# Patient Record
Sex: FEMALE | Race: WHITE | NOT HISPANIC OR LATINO | Employment: PART TIME | URBAN - METROPOLITAN AREA
[De-identification: names, ages, dates, MRNs, and addresses within clinical notes are randomized per-mention and may not be internally consistent; named-entity substitution may affect disease eponyms.]

---

## 2017-02-02 DIAGNOSIS — K21.9 GASTROESOPHAGEAL REFLUX DISEASE WITHOUT ESOPHAGITIS: ICD-10-CM

## 2017-02-02 DIAGNOSIS — E78.00 HIGH BLOOD CHOLESTEROL: Primary | ICD-10-CM

## 2017-02-06 RX ORDER — ATORVASTATIN CALCIUM 40 MG/1
40 TABLET, FILM COATED ORAL DAILY
Qty: 90 TABLET | Refills: 1 | Status: SHIPPED
Start: 2017-02-06 | End: 2017-08-10

## 2017-02-06 RX ORDER — ESOMEPRAZOLE MAGNESIUM 40 MG/1
40 CAPSULE, DELAYED RELEASE ORAL DAILY
Qty: 90 CAPSULE | Refills: 1 | Status: SHIPPED
Start: 2017-02-06 | End: 2017-08-10

## 2017-02-06 NOTE — TELEPHONE ENCOUNTER
atorvastatin (LIPITOR) 40 MG tablet  Last Written Prescription Date:  1/29/16  Last Fill Quantity: 90,   # refills: 3  Last Office Visit with G, P or [facility] prescribing provider: 8/5/16  Future Office visit:   None     esomeprazole (NEXIUM) 40 MG capsule   Last Written Prescription Date:  1/8/16  Last Fill Quantity: 90,   # refills: 3

## 2017-02-17 DIAGNOSIS — F33.1 MAJOR DEPRESSIVE DISORDER, RECURRENT EPISODE, MODERATE (H): ICD-10-CM

## 2017-02-17 DIAGNOSIS — G40.219 PARTIAL SYMPTOMATIC EPILEPSY WITH COMPLEX PARTIAL SEIZURES, INTRACTABLE, WITHOUT STATUS EPILEPTICUS (H): ICD-10-CM

## 2017-02-20 RX ORDER — SERTRALINE HYDROCHLORIDE 100 MG/1
100 TABLET, FILM COATED ORAL DAILY
Qty: 90 TABLET | Refills: 1 | Status: SHIPPED | OUTPATIENT
Start: 2017-02-20 | End: 2017-08-10

## 2017-02-20 RX ORDER — LAMOTRIGINE 25 MG/1
TABLET ORAL
Qty: 180 TABLET | Refills: 10 | OUTPATIENT
Start: 2017-02-20

## 2017-02-20 NOTE — TELEPHONE ENCOUNTER
sertraline (ZOLOFT) 100 MG      Last Written Prescription Date:  1/8/16  Last Fill Quantity: 90,   # refills: 3  Last Office Visit : 8/5/16  Future Office visit:  NONE

## 2017-02-27 ENCOUNTER — TELEPHONE (OUTPATIENT)
Dept: NEUROLOGY | Facility: CLINIC | Age: 57
End: 2017-02-27

## 2017-02-27 DIAGNOSIS — G40.309 GENERALIZED CONVULSIVE EPILEPSY (H): ICD-10-CM

## 2017-02-27 DIAGNOSIS — G40.219 PARTIAL SYMPTOMATIC EPILEPSY WITH COMPLEX PARTIAL SEIZURES, INTRACTABLE, WITHOUT STATUS EPILEPTICUS (H): ICD-10-CM

## 2017-02-27 RX ORDER — LEVETIRACETAM 500 MG/1
TABLET ORAL
Qty: 210 TABLET | Refills: 0 | Status: SHIPPED | OUTPATIENT
Start: 2017-02-27 | End: 2017-03-03

## 2017-02-27 RX ORDER — LAMOTRIGINE 25 MG/1
75 TABLET ORAL 2 TIMES DAILY
Qty: 180 TABLET | Refills: 0 | Status: SHIPPED | OUTPATIENT
Start: 2017-02-27 | End: 2017-03-03

## 2017-02-27 NOTE — TELEPHONE ENCOUNTER
----- Message from Anahi Munroe sent at 2/27/2017  8:30 AM CST -----  Regarding: Refill  Drug Name: levETIRAcetam (KEPPRA) 500 MG tablet     Dose: 500 MG    generic  SIG: Take 2.5 tabs (1250mg) by mouth twice daily                                 Days Supply Needed: Not specified    Drug Name: lamoTRIgine (LAMICTAL) 25 MG tablet   Dose: 25 MG   generic  SIG: Take 3 tablets (75 mg) by mouth 2 times daily                                 Days Supply Needed: Not specifed    Pharmacy: [facility] [address] [place] [address] [address] AT [facility] OF [address]    Date of Last Appointment: 7/26/16  Next RTC Date: 3/3/17  Has a script already been sent recently: No    Additonal Notes: Patient is completely out of her medications. Thanks.    Anahi Munroe CMA

## 2017-03-03 ENCOUNTER — OFFICE VISIT (OUTPATIENT)
Dept: NEUROLOGY | Facility: CLINIC | Age: 57
End: 2017-03-03

## 2017-03-03 VITALS
HEART RATE: 78 BPM | SYSTOLIC BLOOD PRESSURE: 128 MMHG | WEIGHT: 166 LBS | BODY MASS INDEX: 32.59 KG/M2 | DIASTOLIC BLOOD PRESSURE: 82 MMHG | HEIGHT: 60 IN

## 2017-03-03 DIAGNOSIS — G40.309 GENERALIZED CONVULSIVE EPILEPSY (H): Primary | ICD-10-CM

## 2017-03-03 DIAGNOSIS — G40.309 GENERALIZED CONVULSIVE EPILEPSY (H): ICD-10-CM

## 2017-03-03 DIAGNOSIS — G40.219 PARTIAL SYMPTOMATIC EPILEPSY WITH COMPLEX PARTIAL SEIZURES, INTRACTABLE, WITHOUT STATUS EPILEPTICUS (H): ICD-10-CM

## 2017-03-03 RX ORDER — LEVETIRACETAM 500 MG/1
TABLET ORAL
Qty: 210 TABLET | Refills: 11 | Status: SHIPPED | OUTPATIENT
Start: 2017-03-03 | End: 2018-03-22

## 2017-03-03 RX ORDER — LAMOTRIGINE 25 MG/1
75 TABLET ORAL 2 TIMES DAILY
Qty: 180 TABLET | Refills: 11 | Status: SHIPPED | OUTPATIENT
Start: 2017-03-03 | End: 2019-07-09

## 2017-03-03 NOTE — LETTER
3/3/2017       RE: Hattie Mosher  [address] [place] [address]     Dear Colleague,    Thank you for referring your patient, Hattie Mosher, to the [facility] NEUROLOGY at [facility]. Please see a copy of my visit note below.    March 3, 2017        Yg Sharp MD   Primary Care Center    [address] [place] [address] [address]   [place] [address]      RE: Hattie Mosher    MRN: [id number]    : 1960              Dear Dr. Sharp:        Mrs. Mosher is a 56 year old and she is retired.  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  She really does not notice seizures unless they occur in the daytime.  She, at night may have them and, is not aware of them.        She is being followed every 6-8 months.  She returns today and reports no overt seizures.  Her depression is well controlled.  It seems that working part-time has helped her.  Her current medication has been stable and this consists of Lamictal 75 mg twice a day and levetiracetam 1250 twice a day.  At last visit the concentrations were in  for levetiracetam and Lamictal was 4.4.  She has no ongoing concerns except she says her left side is painful, especially the left leg from the back and we reviewed.  She did have an MRI in the past and that was relatively unremarkable.      On exam today, her vitals are okay.  Her exam shows normal left-sided strength.  Reflexes are equal.  Sensory exam is normal.  Range of motion of the neck is also normal.  Sciatic stretch test is negative.      In summary, she appears to be seizure free, although is hard to tell since seizures are nocturnal.  She has some left-sided complaints suggesting sciatica on the left.  She has had before and she is reporting this is getting worse.  We will do an MRI.  We will check her concentrations today and follow once per year as per her request.        Sincerely,        Cesar Freitas MD      D: 2017  11:33   T: 2017 13:22   MT: tb      Name:     OMAR AVILA   MRN:      [id number]        Account:      [id number]   :      1960           Service Date: 2017      Document: [id number]

## 2017-03-03 NOTE — MR AVS SNAPSHOT
After Visit Summary   3/3/2017    Hattie Mosher    MRN: [id number]           Patient Information     Date Of Birth          1960        Visit Information        Provider Department      3/3/2017 8:00 AM Cesar Freitas MD [facility] Neurology        Today's Diagnoses     Generalized convulsive epilepsy (H)    -  1    Partial symptomatic epilepsy with complex partial seizures, intractable, without status epilepticus (H)           Follow-ups after your visit        Follow-up notes from your care team     Return in about 1 year (around 3/3/2018).      Who to contact     Please call your clinic at [phone] to:    Ask questions about your health    Make or cancel appointments    Discuss your medicines    Learn about your test results    Speak to your doctor   If you have compliments or concerns about an experience at your clinic, or if you wish to file a complaint, please contact [facility] Patient Relations at [phone] or email us at [email or web address]         Additional Information About Your Visit        MyChart Information     [employer] gives you secure access to your electronic health record. If you see a primary care provider, you can also send messages to your care team and make appointments. If you have questions, please call your primary care clinic.  If you do not have a primary care provider, please call [phone] and they will assist you.      Ensighten is an electronic gateway that provides easy, online access to your medical records. With Ensighten, you can request a clinic appointment, read your test results, renew a prescription or communicate with your care team.     To access your existing account, please contact your [facility] or call [phone] for assistance.        Care EveryWhere ID     This is your Care EveryWhere ID. This could be used by other organizations to access your [facility]  records  [id number]        Your Vitals Were     Pulse Height BMI (Body Mass Index)             78 1.524 m (5') 32.42 kg/m2          Blood Pressure from Last 3 Encounters:   03/03/17 128/82   08/05/16 158/78   07/26/16 (!) 166/94    Weight from Last 3 Encounters:   03/03/17 75.3 kg (166 lb)   08/05/16 75.8 kg (167 lb)   01/08/16 77.8 kg (171 lb 8 oz)                 Where to get your medicines      These medications were sent to [email or web address] - [place], [place] - [facility]  [address] [facility] [address] [address]     Phone:  [phone]     lamoTRIgine 25 MG tablet    levETIRAcetam 500 MG tablet          Primary Care Provider Office Phone # Fax #    Yg Sharp [phone]       PRIMARY CARE CENTER [address] [place] [address]        Thank you!     Thank you for choosing [facility] NEUROLOGY  for your care. Our goal is always to provide you with excellent care. Hearing back from our patients is one way we can continue to improve our services. Please take a few minutes to complete the written survey that you may receive in the mail after your visit with us. Thank you!             Your Updated Medication List - Protect others around you: Learn how to safely use, store and throw away your medicines at www.disposemymeds.org.          This list is accurate as of: 3/3/17 11:59 PM.  Always use your most recent med list.                   Brand Name Dispense Instructions for use    ALLEGRA-D 12 HOUR  MG per 12 hr tablet   Generic drug:  fexofenadine-pseudoePHEDrine      Take 1 tablet by mouth every morning.       amLODIPine 5 MG tablet    NORVASC    90 tablet    Take 1 tablet (5 mg) by mouth daily       atorvastatin 40 MG tablet    LIPITOR    90 tablet    Take 1 tablet (40 mg) by mouth daily       esomeprazole 40 MG CR capsule    nexIUM    90 capsule    Take 1 capsule (40 mg) by mouth daily       guaiFENesin-dextromethorphan 100-10 MG/5ML syrup     ROBITUSSIN DM    560 mL    Take 5 mLs by mouth every 4 hours as needed for cough       hydrocortisone 1 % cream    CORTAID     Apply topically 2 times daily as needed Reported on 3/3/2017       ibuprofen 800 MG tablet    ADVIL/MOTRIN    60 tablet    Take 1 tablet (800 mg) by mouth every 8 hours as needed for pain       lamoTRIgine 25 MG tablet    LaMICtal    180 tablet    Take 3 tablets (75 mg) by mouth 2 times daily       levETIRAcetam 500 MG tablet    KEPPRA    210 tablet    Take 2.5 tabs (1250mg) by mouth twice daily       magnesium oxide 400 MG tablet    MAG-OX    90 tablet    Take 1 tablet (400 mg) by mouth daily       NASONEX 50 MCG/ACT spray   Generic drug:  mometasone      2 sprays by Both Nostrils route daily.       sertraline 100 MG tablet    ZOLOFT    90 tablet    Take 1 tablet (100 mg) by mouth daily       THERAFLU FLU/COLD/COUGH PO      Reported on 3/3/2017       ZYRTEC 10 MG tablet   Generic drug:  cetirizine      Take 10 mg by mouth every evening.

## 2017-03-03 NOTE — PROGRESS NOTES
March 3, 2017        Yg Sharp MD   Primary Care Center    [address] [place] [address] [address]   [place] [address]      RE: Hattie Mosher    MRN: [id number]    : 1960              Dear Dr. Sharp:        Mrs. Mosher is a 56 year old and she is retired.  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  She really does not notice seizures unless they occur in the daytime.  She, at night may have them and, is not aware of them.        She is being followed every 6-8 months.  She returns today and reports no overt seizures.  Her depression is well controlled.  It seems that working part-time has helped her.  Her current medication has been stable and this consists of Lamictal 75 mg twice a day and levetiracetam 1250 twice a day.  At last visit the concentrations were in  for levetiracetam and Lamictal was 4.4.  She has no ongoing concerns except she says her left side is painful, especially the left leg from the back and we reviewed.  She did have an MRI in the past and that was relatively unremarkable.      On exam today, her vitals are okay.  Her exam shows normal left-sided strength.  Reflexes are equal.  Sensory exam is normal.  Range of motion of the neck is also normal.  Sciatic stretch test is negative.      In summary, she appears to be seizure free, although is hard to tell since seizures are nocturnal.  She has some left-sided complaints suggesting sciatica on the left.  She has had before and she is reporting this is getting worse.  We will do an MRI.  We will check her concentrations today and follow once per year as per her request.        Sincerely,        MD MIKE Rutherford MD             D: 2017 11:33   T: 2017 13:22   MT: tb      Name:     HATTIE MOSHER   MRN:      [id number]        Account:      [id number]   :      1960           Service Date: 2017      Document: [id number]

## 2017-03-03 NOTE — NURSING NOTE
Spoke with Dr Freitas via telephone. Ordered Keppra and Lamictal level per TORB.  Terell Hollingsworth RN

## 2017-03-04 LAB
LAMOTRIGINE SERPL-MCNC: 5.7 UG/ML
LEVETIRACETAM SERPL-MCNC: 57 UG/ML

## 2017-04-29 DIAGNOSIS — R51.9 HEADACHE: ICD-10-CM

## 2017-05-01 RX ORDER — IBUPROFEN 800 MG/1
800 TABLET, FILM COATED ORAL EVERY 8 HOURS PRN
Qty: 60 TABLET | Refills: 1 | Status: SHIPPED | OUTPATIENT
Start: 2017-05-01 | End: 2017-05-04

## 2017-05-02 NOTE — TELEPHONE ENCOUNTER
ibuprofen 800 MG   Last Written Prescription Date:  1/8/16  Last Fill Quantity:60   # refills: 1  Last Office Visit : 8/15/16  Future Office visit:  NONE  BP Readings from Last 3 Encounters:   03/03/17 128/82   08/05/16 158/78   07/26/16 (!) 166/94     Creatinine   Date Value Ref Range Status   08/05/2016 0.69 0.52 - 1.04 mg/dL Final

## 2017-05-04 ENCOUNTER — OFFICE VISIT (OUTPATIENT)
Dept: FAMILY MEDICINE | Facility: CLINIC | Age: 57
End: 2017-05-04

## 2017-05-04 VITALS
HEART RATE: 77 BPM | WEIGHT: 166.7 LBS | SYSTOLIC BLOOD PRESSURE: 122 MMHG | DIASTOLIC BLOOD PRESSURE: 84 MMHG | BODY MASS INDEX: 32.56 KG/M2

## 2017-05-04 DIAGNOSIS — R51.9 HEADACHE, UNSPECIFIED HEADACHE TYPE: ICD-10-CM

## 2017-05-04 DIAGNOSIS — M79.605 PAIN OF LEFT LOWER EXTREMITY: Primary | ICD-10-CM

## 2017-05-04 DIAGNOSIS — G47.33 OSA (OBSTRUCTIVE SLEEP APNEA): ICD-10-CM

## 2017-05-04 RX ORDER — IBUPROFEN 800 MG/1
800 TABLET, FILM COATED ORAL EVERY 8 HOURS PRN
Qty: 60 TABLET | Refills: 1 | Status: SHIPPED | OUTPATIENT
Start: 2017-05-04 | End: 2017-12-26

## 2017-05-04 RX ORDER — METHYLPREDNISOLONE 4 MG
TABLET, DOSE PACK ORAL
Qty: 21 TABLET | Refills: 0 | Status: SHIPPED | OUTPATIENT
Start: 2017-05-04 | End: 2017-05-22

## 2017-05-04 ASSESSMENT — PAIN SCALES - GENERAL: PAINLEVEL: MODERATE PAIN (4)

## 2017-05-04 NOTE — NURSING NOTE
Chief Complaint   Patient presents with     Pain     Pateint here for pain on left side     Refill Request     Patient here for medicaiton refills.     Bhumi Lucero LPN at 3:29 PM on 5/4/2017.

## 2017-05-04 NOTE — MR AVS SNAPSHOT
After Visit Summary   5/4/2017    Hattie Mosher    MRN: [id number]           Patient Information     Date Of Birth          1960        Visit Information        Provider Department      5/4/2017 3:05 PM Yg Sharp MD [facility]        Today's Diagnoses     Pain of left lower extremity    -  1    Headache, unspecified headache type        NOELLE (obstructive sleep apnea)           Follow-ups after your visit        Additional Services     SLEEP EVALUATION & MANAGEMENT REFERRAL - ADULT       Please be aware that coverage of these services is subject to the terms and limitations of your health insurance plan.  Call member services at your health plan with any benefit or coverage questions.      Please bring the following to your appointment:    >>   List of current medications   >>   This referral request   >>   Any documents/labs given to you for this referral    [facility]/Lab   [phone] (Age 15 and up)            SPORTS MEDICINE REFERRAL       Your provider has referred you to:  [facility]: Sports Medicine Clinic [facility] [phone]   [email or web address]    Please be aware that coverage of these services is subject to the terms and limitations of your health insurance plan.  Call member services at your health plan with any benefit or coverage questions.      Please bring the following to your appointment:    >>   Any x-rays, CTs or MRIs which have been performed.  Contact the facility where they were done to arrange for  prior to your scheduled appointment.    >>   List of current medications   >>   This referral request   >>   Any documents/labs given to you for this referral                  Follow-up notes from your care team     Return in about 3 months (around 8/4/2017).      Your next 10 appointments already scheduled     May 08, 2017  2:45 PM CDT   (Arrive by 2:30 PM)   New Patient Visit  with Erik Rojo MD   [facility] Sports Medicine ([facility])    [address]  5th Floor  [facility] [place] [address]   [phone]            Jun 19, 2017 10:00 AM CDT   New Sleep Patient with David Vargas MD   [facility] [place], Sleep Study ([facility])    [address] [place] [address]   [phone]            Aug 10, 2017  3:05 PM CDT   (Arrive by 2:50 PM)   Return Visit with Yg Sharp MD   [facility] Primary Care Clinic ([facility])    [address]  4th Floor  [place] [id number] [phone]              Future tests that were ordered for you today     Open Future Orders        Priority Expected Expires Ordered    SLEEP EVALUATION & MANAGEMENT REFERRAL - ADULT Routine  5/4/2018 5/4/2017            Who to contact     Please call your clinic at [phone] to:    Ask questions about your health    Make or cancel appointments    Discuss your medicines    Learn about your test results    Speak to your doctor   If you have compliments or concerns about an experience at your clinic, or if you wish to file a complaint, please contact [facility] Patient Relations at [phone] or email us at [email or web address]         Additional Information About Your Visit        Leiyoohart Information     SightCallt gives you secure access to your electronic health record. If you see a primary care provider, you can also send messages to your care team and make appointments. If you have questions, please call your primary care clinic.  If you do not have a primary care provider, please call [phone] and they will assist you.      SolFocus is an electronic gateway that provides easy, online access to your medical records. With [employer], you can request a clinic appointment, read your test results, renew a prescription or  communicate with your care team.     To access your existing account, please contact your [facility] Clinic or call [phone] for assistance.        Care EveryWhere ID     This is your Care EveryWhere ID. This could be used by other organizations to access your [place] medical records  [id number]        Your Vitals Were     Pulse Breastfeeding? BMI (Body Mass Index)             77 No 32.56 kg/m2          Blood Pressure from Last 3 Encounters:   05/04/17 122/84   03/03/17 128/82   08/05/16 158/78    Weight from Last 3 Encounters:   05/04/17 75.6 kg (166 lb 11.2 oz)   03/03/17 75.3 kg (166 lb)   08/05/16 75.8 kg (167 lb)              We Performed the Following     SPORTS MEDICINE REFERRAL          Today's Medication Changes          These changes are accurate as of: 5/4/17  4:10 PM.  If you have any questions, ask your nurse or doctor.               Start taking these medicines.        Dose/Directions    methylPREDNISolone 4 MG tablet   Commonly known as:  MEDROL DOSEPAK   Used for:  Pain of left lower extremity   Started by:  Yg Sharp MD        Follow package instructions   Quantity:  21 tablet   Refills:  0            Where to get your medicines      These medications were sent to [facility] [address] [address]     Phone:  [phone]     ibuprofen 800 MG tablet    methylPREDNISolone 4 MG tablet                Primary Care Provider Office Phone # Fax #    Yg Sharp [phone]       PRIMARY CARE CENTER [address] [place] [address]        Thank you!     Thank you for choosing [facility] PRIMARY CARE CLINIC  for your care. Our goal is always to provide you with excellent care. Hearing back from our patients is one way we can continue to improve our services. Please take a few minutes to complete the written survey that you may receive in the mail after your  visit with us. Thank you!             Your Updated Medication List - Protect others around you: Learn how to safely use, store and throw away your medicines at www.disposemymeds.org.          This list is accurate as of: 5/4/17  4:10 PM.  Always use your most recent med list.                   Brand Name Dispense Instructions for use    ALLEGRA-D 12 HOUR  MG per 12 hr tablet   Generic drug:  fexofenadine-pseudoePHEDrine      Take 1 tablet by mouth every morning.       amLODIPine 5 MG tablet    NORVASC    90 tablet    Take 1 tablet (5 mg) by mouth daily       atorvastatin 40 MG tablet    LIPITOR    90 tablet    Take 1 tablet (40 mg) by mouth daily       esomeprazole 40 MG CR capsule    nexIUM    90 capsule    Take 1 capsule (40 mg) by mouth daily       guaiFENesin-dextromethorphan 100-10 MG/5ML syrup    ROBITUSSIN DM    560 mL    Take 5 mLs by mouth every 4 hours as needed for cough       hydrocortisone 1 % cream    CORTAID     Apply topically 2 times daily as needed Reported on 3/3/2017       ibuprofen 800 MG tablet    ADVIL/MOTRIN    60 tablet    Take 1 tablet (800 mg) by mouth every 8 hours as needed for pain       lamoTRIgine 25 MG tablet    LaMICtal    180 tablet    Take 3 tablets (75 mg) by mouth 2 times daily       levETIRAcetam 500 MG tablet    KEPPRA    210 tablet    Take 2.5 tabs (1250mg) by mouth twice daily       magnesium oxide 400 MG tablet    MAG-OX    90 tablet    Take 1 tablet (400 mg) by mouth daily       methylPREDNISolone 4 MG tablet    MEDROL DOSEPAK    21 tablet    Follow package instructions       NASONEX 50 MCG/ACT spray   Generic drug:  mometasone      2 sprays by Both Nostrils route daily.       sertraline 100 MG tablet    ZOLOFT    90 tablet    Take 1 tablet (100 mg) by mouth daily       THERAFLU FLU/COLD/COUGH PO      Reported on 3/3/2017       ZYRTEC 10 MG tablet   Generic drug:  cetirizine      Take 10 mg by mouth every evening.

## 2017-05-04 NOTE — PROGRESS NOTES
SUBJECTIVE:    Pt is a 56 year old female with pmh of     Patient Active Problem List   Diagnosis     Generalized convulsive epilepsy (H)     NOELLE (obstructive sleep apnea)     Perforation of tympanic membrane     Epilepsy (H)     Hypertrophic and atrophic condition of skin     Hyponatremia     Essential hypertension     Low back pain     SIADH (syndrome of inappropriate ADH production): probable med-induced       who is here for evaluation of had concerns including Pain and Refill Request.    Here for a few things.  One, f/u htn, tolerates meds, good control, no cardiac sx on ROS, labs up to date on chart rvw  Two, overdue NOELLE f/u, will refer back  Three, seizures, sees neuro here, last note rvwd  Four, left leg pain, gradual onset, from hip to knee lateral, not weak/numb, no bowel/bladder control complaints. 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 No injury. Hurts to walk. No better w/ tylenol, advil. Hard to say how long its hurt.     Allergies   Allergen Reactions     Nkda [No Known Drug Allergies]      Seasonal Allergies            Current Outpatient Prescriptions   Medication Sig Dispense Refill     ibuprofen (ADVIL/MOTRIN) 800 MG tablet Take 1 tablet (800 mg) by mouth every 8 hours as needed for pain 60 tablet 1     methylPREDNISolone (MEDROL DOSEPAK) 4 MG tablet Follow package instructions 21 tablet 0     lamoTRIgine (LAMICTAL) 25 MG tablet Take 3 tablets (75 mg) by mouth 2 times daily 180 tablet 11     levETIRAcetam (KEPPRA) 500 MG tablet Take 2.5 tabs (1250mg) by mouth twice daily 210 tablet 11     sertraline (ZOLOFT) 100 MG tablet Take 1 tablet (100 mg) by mouth daily 90 tablet 1     atorvastatin (LIPITOR) 40 MG tablet Take 1 tablet (40 mg) by mouth daily 90 tablet 1     esomeprazole (NEXIUM) 40 MG CR capsule Take 1 capsule (40 mg) by mouth daily 90 capsule 1     amLODIPine (NORVASC) 5 MG tablet Take 1 tablet (5 mg) by mouth daily 90 tablet 3     magnesium oxide (MAG-OX) 400 MG tablet Take 1 tablet (400  mg) by mouth daily 90 tablet 3     guaiFENesin-dextromethorphan (ROBITUSSIN DM) 100-10 MG/5ML syrup Take 5 mLs by mouth every 4 hours as needed for cough 560 mL 0     Rucofzacp-WWA-DE-APAP (THERAFLU FLU/COLD/COUGH PO) Reported on 3/3/2017       hydrocortisone 1 % cream Apply topically 2 times daily as needed Reported on 3/3/2017       fexofenadine-pseudoephedrine (ALLEGRA-D 12 HOUR)  MG per tablet Take 1 tablet by mouth every morning.       mometasone (NASONEX) 50 MCG/ACT nasal spray 2 sprays by Both Nostrils route daily.       cetirizine (ZYRTEC) 10 MG tablet Take 10 mg by mouth every evening.         Social History   Substance Use Topics     Smoking status: Never Smoker     Smokeless tobacco: Never Used     Alcohol use 1.2 oz/week     2 Standard drinks or equivalent per week      Comment: 1-2 drinks/week           OBJECTIVE:  /84  Pulse 77  Wt 75.6 kg (166 lb 11.2 oz)  Breastfeeding? No  BMI 32.56 kg/m2  GENERAL APPEARANCE: Alert, no acute distress  RESP: lungs clear to auscultation   CV: normal rate, regular rhythm, no murmur or gallop  NEURO: Alert, oriented, speech and mentation normal  PSYCHE: mentation appears normal, affect and mood normal  Ortho: left leg normal n/v and ortho exam, mild tender lateral upper leg    ASSESSMENT/PLAN:    Leg pain: ITB vs sciatica; pred burst trial. See Sp Med  Htn; as is  Seizures: per neuro  NOELLE: overdue for f/u, refer back  She is calling her gyn for annual this summer too she states  Has summer off when school closed then busy during year, see me in August before school resumes    COMFORT ROJAS MD

## 2017-05-22 ENCOUNTER — OFFICE VISIT (OUTPATIENT)
Dept: ORTHOPEDICS | Facility: CLINIC | Age: 57
End: 2017-05-22

## 2017-05-22 DIAGNOSIS — M54.42 CHRONIC LEFT-SIDED LOW BACK PAIN WITH LEFT-SIDED SCIATICA: Primary | ICD-10-CM

## 2017-05-22 DIAGNOSIS — G89.29 CHRONIC LEFT-SIDED LOW BACK PAIN WITH LEFT-SIDED SCIATICA: Primary | ICD-10-CM

## 2017-05-22 NOTE — PROGRESS NOTES
Sports Medicine Clinic Visit    PCP: Yg Sharp JEFFERY Mosher is a 56 year old female who is seen  in consultation at the request of Dr. Sharp presenting with back pain radiating to left leg to left foot.     Injury: none      There were no vitals taken for this visit.         PMH:  Past Medical History:   Diagnosis Date     Apnea, sleep 2007    reeval 12/11 Mild NOELLE     Essential hypertension 6/7/2013     Migraine headaches      Seizure disorder, complex partial (H)      Seizures (H)        Active problem list:  Patient Active Problem List   Diagnosis     Generalized convulsive epilepsy (H)     NOELLE (obstructive sleep apnea)     Perforation of tympanic membrane     Epilepsy (H)     Hypertrophic and atrophic condition of skin     Hyponatremia     Essential hypertension     Low back pain     SIADH (syndrome of inappropriate ADH production): probable med-induced       FH:  Family History   Problem Relation Age of Onset     Neurologic Disorder Mother      MS     Cardiovascular Father      Aneurysm     Glaucoma Brother      Retinal detachment No family hx of      Macular Degeneration No family hx of        SH:  Social History     Social History     Marital status:      Spouse name: N/A     Number of children: N/A     Years of education: N/A     Occupational History      [employer]     Social History Main Topics     Smoking status: Never Smoker     Smokeless tobacco: Never Used     Alcohol use 1.2 oz/week     2 Standard drinks or equivalent per week      Comment: 1-2 drinks/week     Drug use: No     Sexual activity: Yes     Partners: Male     Other Topics Concern     Caffeine Concern Yes     Drinks several cups of coffee/day     Sleep Concern Yes     Reports history of untreated sleep apnea     Social History Narrative       MEDS:  See EMR, reviewed  ALL:  See EMR, reviewed    REVIEW OF SYSTEMS:  CONSTITUTIONAL:NEGATIVE for fever, chills, change in weight  INTEGUMENTARY/SKIN: NEGATIVE for  worrisome rashes, moles or lesions  EYES: NEGATIVE for vision changes or irritation  ENT/MOUTH: NEGATIVE for ear, mouth and throat problems  RESP:NEGATIVE for significant cough or SOB  BREAST: NEGATIVE for masses, tenderness or discharge  CV: NEGATIVE for chest pain, palpitations or peripheral edema  GI: NEGATIVE for nausea, abdominal pain, heartburn, or change in bowel habits  :NEGATIVE for frequency, dysuria, or hematuria  :NEGATIVE for frequency, dysuria, or hematuria  NEURO: NEGATIVE for weakness, dizziness or paresthesias  ENDOCRINE: NEGATIVE for temperature intolerance, skin/hair changes  HEME/ALLERGY/IMMUNE: NEGATIVE for bleeding problems  PSYCHIATRIC: NEGATIVE for changes in mood or affect        Brain MRI without and with contrast     History:  COMPLEX PARTIAL SEIZURE DISORDER  Comparison:  11/17/2006       Technique: Sagittal T1-weighted, axial diffusion, susceptibility,  FLAIR, and oblique coronal FLAIR and T2-weighted images obtained  without intravenous contrast. Following gadolinium-based intravenous  contrast administration, axial and coronal T1-weighted images were  obtained.     Findings: There are a few punctate foci of T2 and FLAIR hyperintensity  in the periventricular white matter.  There is no mass affect or midline shift or intracranial hemorrhage.  The ventricles are not enlarged out of proportion to the cerebral  sulci. The gray white matter differentiation of the cerebral  hemispheres is preserved. No definite abnormal signal is visualized  within the medial temporal lobes, and there is no definite atrophy or  volume loss in those areas. No congenital abnormality identified of  the cerebral parenchyma. Postcontrast images demonstrate no abnormal  intracranial enhancing lesions.      The major intracranial vascular flow-voids appear patent. The orbits,  the visualized portions of the paranasal sinuses, and mastoid air  cells are relatively clear.      Impression:  1. No definite temporal  lobe abnormality, mass, or congenital lesion  identified as a cause of the patient's seizures.  2. No evidence of abnormal enhancement intracranially.      I have personally reviewed the image and initial interpretation and  agree with the findings.          of the Lumbar Spine without contrast 11/22/2011     History:  SCIATICA, radiculopathy + / - LBP?- Radiates past knee,  duration .   Comparison: none     Technique: Sagittal T1-weighted, T2-weighted, STIR and  diffusion-weighted and axial T2-weighted and T2* gradient echo images  of the lumbar spine were obtained without intravenous contrast.       Findings: There are 5  lumbar-type vertebrae assumed for the purposes  of this dictation.  The tip of the conus medullaris is at  L1.  The  lumbar vertebral column appears normally aligned.  There is mild disc  height narrowing at L2-L3 and moderate disc height narrowing at L3-L4  and L4-L5. Diffuse broad-based posterior and anterior disc bulging is  evident at the same levels.  Regarding bone marrow signal intensity,  small rounded foci of T1 and T2 hyperintensity are noted at all lumbar  vertebral levels, likely indicating sites of focal fat deposition. At  L4-L5, bandlike foci of T1 and T2 hyperintensity are evident in the  opposing vertebral endplates posteriorly, consistent with Modic Type  II degenerative change. Diffusion-weighted images demonstrate no  evident areas of restricted effusion within the lower thoracic spinal  cord.     On a level by level basis:     L2-3: No spinal canal or neural foraminal narrowing.     L3-4: No spinal canal or neural foraminal narrowing.     L4-5: No spinal canal or neural foraminal narrowing.     L5-S1: No spinal canal or neural foraminal narrowing.     The visualized paraspinous tissues are unremarkable.      Impression: Mild degenerative changes at L2-L3, L3-L4 and L4-L5  without associated spinal canal or neural foraminal narrowing.            SUBJECTIVE:  This 56-year-old  "female is seen at the request of Dr. Sharp in consultation for left back and left radicular leg discomfort.  At its worst, she has discomfort she feels at the left buttock that bothers her with long periods of sitting and standing.  It can radiate from the buttock to the thigh past the knee to the shin and sometimes down to the foot.  No numbness or tingling but she will often have radiating pain.  It can make it difficult for her to position herself and to sit for long periods.  It has been a chronic problem \"for years.\"  If she looks back over the last year, she will have recurrent episodes that last for as long as a month, but they seem to happen persistently.  Then they will improve.  She had the problem in 2011 and had an MRI done of the lumbar spine that showed some mild degenerative disk changes at multiple levels but no signs of stenosis or impingement.  She did see Physical Therapy, but she admits that she does not do the exercises and did not find them particularly interesting to do.  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      OBJECTIVE:  Above ideal weight.  Straight leg raise is negative.  Lower extremity strength to flexion/extension at hip, knee, ankle including toe strength and foot evertor strength are 5/5 symmetrically bilaterally.  IVANNA test is negative bilaterally.  She is nontender over the left sacroiliac joint, mildly tender over the right.  Nontender to spring testing of the lumbar spine.  I do not palpate any abdominal masses.  Lower extremity strength is intact at the hip, knee, ankle and foot including toe strength and foot evertor strength.  Distal pulses and sensation are intact.  The overlying skin is normal.  She is appropriate in conversation and affect.      ASSESSMENT:  Low back discomfort with left radicular leg pain, recurrent for the past year.      PLAN:  A repeat MRI of the lumbar spine is pending to look for left-sided impingement.  We " talked about revisiting physical therapy or trying an epidural injection.  We will talk about further when she is seen in followup after this MRI.

## 2017-05-22 NOTE — LETTER
5/22/2017      RE: Hattie Mosher  [address] [facility] [id number]       Sports Medicine Clinic Visit    PCP: Yg Sharpjuan Mosher is a 56 year old female who is seen  in consultation at the request of Dr. Sharp presenting with back pain radiating to left leg to left foot.     Injury: none      There were no vitals taken for this visit.         PMH:  Past Medical History:   Diagnosis Date     Apnea, sleep 2007    reeval 12/11 Mild NOELLE     Essential hypertension 6/7/2013     Migraine headaches      Seizure disorder, complex partial (H)      Seizures (H)        Active problem list:  Patient Active Problem List   Diagnosis     Generalized convulsive epilepsy (H)     NOELLE (obstructive sleep apnea)     Perforation of tympanic membrane     Epilepsy (H)     Hypertrophic and atrophic condition of skin     Hyponatremia     Essential hypertension     Low back pain     SIADH (syndrome of inappropriate ADH production): probable med-induced       FH:  Family History   Problem Relation Age of Onset     Neurologic Disorder Mother      MS     Cardiovascular Father      Aneurysm     Glaucoma Brother      Retinal detachment No family hx of      Macular Degeneration No family hx of        SH:  Social History     Social History     Marital status:      Spouse name: N/A     Number of children: N/A     Years of education: N/A     Occupational History      [employer]     Social History Main Topics     Smoking status: Never Smoker     Smokeless tobacco: Never Used     Alcohol use 1.2 oz/week     2 Standard drinks or equivalent per week      Comment: 1-2 drinks/week     Drug use: No     Sexual activity: Yes     Partners: Male     Other Topics Concern     Caffeine Concern Yes     Drinks several cups of coffee/day     Sleep Concern Yes     Reports history of untreated sleep apnea     Social History Narrative       MEDS:  See EMR, reviewed  ALL:  See EMR, reviewed    REVIEW OF  SYSTEMS:  CONSTITUTIONAL:NEGATIVE for fever, chills, change in weight  INTEGUMENTARY/SKIN: NEGATIVE for worrisome rashes, moles or lesions  EYES: NEGATIVE for vision changes or irritation  ENT/MOUTH: NEGATIVE for ear, mouth and throat problems  RESP:NEGATIVE for significant cough or SOB  BREAST: NEGATIVE for masses, tenderness or discharge  CV: NEGATIVE for chest pain, palpitations or peripheral edema  GI: NEGATIVE for nausea, abdominal pain, heartburn, or change in bowel habits  :NEGATIVE for frequency, dysuria, or hematuria  :NEGATIVE for frequency, dysuria, or hematuria  NEURO: NEGATIVE for weakness, dizziness or paresthesias  ENDOCRINE: NEGATIVE for temperature intolerance, skin/hair changes  HEME/ALLERGY/IMMUNE: NEGATIVE for bleeding problems  PSYCHIATRIC: NEGATIVE for changes in mood or affect        Brain MRI without and with contrast     History:  COMPLEX PARTIAL SEIZURE DISORDER  Comparison:  11/17/2006       Technique: Sagittal T1-weighted, axial diffusion, susceptibility,  FLAIR, and oblique coronal FLAIR and T2-weighted images obtained  without intravenous contrast. Following gadolinium-based intravenous  contrast administration, axial and coronal T1-weighted images were  obtained.     Findings: There are a few punctate foci of T2 and FLAIR hyperintensity  in the periventricular white matter.  There is no mass affect or midline shift or intracranial hemorrhage.  The ventricles are not enlarged out of proportion to the cerebral  sulci. The gray white matter differentiation of the cerebral  hemispheres is preserved. No definite abnormal signal is visualized  within the medial temporal lobes, and there is no definite atrophy or  volume loss in those areas. No congenital abnormality identified of  the cerebral parenchyma. Postcontrast images demonstrate no abnormal  intracranial enhancing lesions.      The major intracranial vascular flow-voids appear patent. The orbits,  the visualized portions of the  paranasal sinuses, and mastoid air  cells are relatively clear.      Impression:  1. No definite temporal lobe abnormality, mass, or congenital lesion  identified as a cause of the patient's seizures.  2. No evidence of abnormal enhancement intracranially.      I have personally reviewed the image and initial interpretation and  agree with the findings.          of the Lumbar Spine without contrast 11/22/2011     History:  SCIATICA, radiculopathy + / - LBP?- Radiates past knee,  duration .   Comparison: none     Technique: Sagittal T1-weighted, T2-weighted, STIR and  diffusion-weighted and axial T2-weighted and T2* gradient echo images  of the lumbar spine were obtained without intravenous contrast.       Findings: There are 5  lumbar-type vertebrae assumed for the purposes  of this dictation.  The tip of the conus medullaris is at  L1.  The  lumbar vertebral column appears normally aligned.  There is mild disc  height narrowing at L2-L3 and moderate disc height narrowing at L3-L4  and L4-L5. Diffuse broad-based posterior and anterior disc bulging is  evident at the same levels.  Regarding bone marrow signal intensity,  small rounded foci of T1 and T2 hyperintensity are noted at all lumbar  vertebral levels, likely indicating sites of focal fat deposition. At  L4-L5, bandlike foci of T1 and T2 hyperintensity are evident in the  opposing vertebral endplates posteriorly, consistent with Modic Type  II degenerative change. Diffusion-weighted images demonstrate no  evident areas of restricted effusion within the lower thoracic spinal  cord.     On a level by level basis:     L2-3: No spinal canal or neural foraminal narrowing.     L3-4: No spinal canal or neural foraminal narrowing.     L4-5: No spinal canal or neural foraminal narrowing.     L5-S1: No spinal canal or neural foraminal narrowing.     The visualized paraspinous tissues are unremarkable.      Impression: Mild degenerative changes at L2-L3, L3-L4 and  "L4-L5  without associated spinal canal or neural foraminal narrowing.            SUBJECTIVE:  This 56-year-old female is seen at the request of Dr. Sharp in consultation for left back and left radicular leg discomfort.  At its worst, she has discomfort she feels at the left buttock that bothers her with long periods of sitting and standing.  It can radiate from the buttock to the thigh past the knee to the shin and sometimes down to the foot.  No numbness or tingling but she will often have radiating pain.  It can make it difficult for her to position herself and to sit for long periods.  It has been a chronic problem \"for years.\"  If she looks back over the last year, she will have recurrent episodes that last for as long as a month, but they seem to happen persistently.  Then they will improve.  She had the problem in 2011 and had an MRI done of the lumbar spine that showed some mild degenerative disk changes at multiple levels but no signs of stenosis or impingement.  She did see Physical Therapy, but she admits that she does not do the exercises and did not find them particularly interesting to do.  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      OBJECTIVE:  Above ideal weight.  Straight leg raise is negative.  Lower extremity strength to flexion/extension at hip, knee, ankle including toe strength and foot evertor strength are 5/5 symmetrically bilaterally.  IVANNA test is negative bilaterally.  She is nontender over the left sacroiliac joint, mildly tender over the right.  Nontender to spring testing of the lumbar spine.  I do not palpate any abdominal masses.  Lower extremity strength is intact at the hip, knee, ankle and foot including toe strength and foot evertor strength.  Distal pulses and sensation are intact.  The overlying skin is normal.  She is appropriate in conversation and affect.      ASSESSMENT:  Low back discomfort with left radicular leg pain, recurrent for the " past year.      PLAN:  A repeat MRI of the lumbar spine is pending to look for left-sided impingement.  We talked about revisiting physical therapy or trying an epidural injection.  We will talk about further when she is seen in followup after this MRI.           Erik Rojo MD

## 2017-05-22 NOTE — MR AVS SNAPSHOT
After Visit Summary   5/22/2017    Hattie Mosher    MRN: [id number]           Patient Information     Date Of Birth          1960        Visit Information        Provider Department      5/22/2017 3:00 PM Erik Rojo MD [facility] Sports Medicine        Today's Diagnoses     Chronic left-sided low back pain with left-sided sciatica    -  1       Follow-ups after your visit        Your next 10 appointments already scheduled     May 26, 2017  4:45 PM CDT   (Arrive by 4:30 PM)   MR LUMBAR SPINE W/O CONTRAST with [facility] ([facility])    [address] [address] [phone]           Take your medicines as usual, unless your doctor tells you not to. Bring a list of your current medicines to your exam (including vitamins, minerals and over-the-counter drugs). Also bring the results of similar scans you may have had.  Please remove any body piercings and hair extensions before you arrive.  Follow your doctor s orders. If you do not, we may have to postpone your exam.  You will not have contrast for this exam. You do not need to do anything special to prepare.  The MRI machine uses a strong magnet. Please wear clothes without metal (snaps, zippers). A sweatsuit works well, or we may give you a hospital gown.   **IMPORTANT** THE INSTRUCTIONS BELOW ARE ONLY FOR THOSE PATIENTS WHO HAVE BEEN TOLD THEY WILL RECEIVE SEDATION OR GENERAL ANESTHESIA DURING THEIR MRI PROCEDURE:  IF YOU WILL RECEIVE SEDATION (take medicine to help you relax during your exam):   You must get the medicine from your doctor before you arrive. Bring the medicine to the exam. Do not take it at home.   Arrive one hour early. Bring someone who can take you home after the test. Your medicine will make you sleepy. After the exam, you may not drive, take a bus or take a taxi by yourself.   No eating 8 hours before your exam. You may have clear  liquids up until 4 hours before your exam. (Clear liquids include water, clear tea, black coffee and fruit juice without pulp.)  IF YOU WILL RECEIVE ANESTHESIA (be asleep for your exam):   Arrive 1 1/2 hours early. Bring someone who can take you home after the test. You may not drive, take a bus or take a taxi by yourself.   No eating 8 hours before your exam. You may have clear liquids up until 4 hours before your exam. (Clear liquids include water, clear tea, black coffee and fruit juice without pulp.)   You will spend four to five hours in the recovery room.  Please call the Imaging Department at your exam site with any questions.            May 30, 2017  2:45 PM CDT   (Arrive by 2:30 PM)   Return Visit with Erik Rojo MD   [facility] Sports Medicine ([facility])    [address] [place] [address]  5th [facility] [id number] [phone]            Jun 19, 2017 10:00 AM CDT   New Sleep Patient with David Vargas MD   [facility] [place], Sleep Study ([facility])    [address] [place] [address]   [phone]            Aug 10, 2017  3:05 PM CDT   (Arrive by 2:50 PM)   Return Visit with Yg Sharp MD   [facility] ([facility])    [address] [place] [address]  4th [facility] [id number] [phone]              Future tests that were ordered for you today     Open Future Orders        Priority Expected Expires Ordered    MRI Lumbar spine w/o contrast Routine  5/22/2018 5/22/2017            Who to contact     Please call your clinic at [phone] to:    Ask questions about your health    Make or cancel appointments    Discuss your medicines    Learn about your test results    Speak to your doctor   If you have compliments or concerns about an experience at your clinic, or if you wish to file a complaint, please contact [facility]  Physicians Patient Relations at [phone] or email us at [email or web address]         Additional Information About Your Visit        [employer] Information     [employer] gives you secure access to your electronic health record. If you see a primary care provider, you can also send messages to your care team and make appointments. If you have questions, please call your primary care clinic.  If you do not have a primary care provider, please call [phone] and they will assist you.      [employer] is an electronic gateway that provides easy, online access to your medical records. With [employer], you can request a clinic appointment, read your test results, renew a prescription or communicate with your care team.     To access your existing account, please contact your [facility] or call [phone] for assistance.        Care EveryWhere ID     This is your Care EveryWhere ID. This could be used by other organizations to access your [facility] medical records  [id number]         Blood Pressure from Last 3 Encounters:   05/04/17 122/84   03/03/17 128/82   08/05/16 158/78    Weight from Last 3 Encounters:   05/04/17 166 lb 11.2 oz (75.6 kg)   03/03/17 166 lb (75.3 kg)   08/05/16 167 lb (75.8 kg)                 Today's Medication Changes          These changes are accurate as of: 5/22/17  3:24 PM.  If you have any questions, ask your nurse or doctor.               Stop taking these medicines if you haven't already. Please contact your care team if you have questions.     methylPREDNISolone 4 MG tablet   Commonly known as:  MEDROL DOSEPAK                    Primary Care Provider Office Phone # Fax #    Yg Sharp [phone]       PRIMARY CARE CENTER [address] [place] [address]        Thank you!     Thank you for choosing [facility]  for your care. Our goal is always to provide you with excellent care. Hearing back from our  patients is one way we can continue to improve our services. Please take a few minutes to complete the written survey that you may receive in the mail after your visit with us. Thank you!             Your Updated Medication List - Protect others around you: Learn how to safely use, store and throw away your medicines at www.disposemymeds.org.          This list is accurate as of: 5/22/17  3:24 PM.  Always use your most recent med list.                   Brand Name Dispense Instructions for use    ALLEGRA-D 12 HOUR  MG per 12 hr tablet   Generic drug:  fexofenadine-pseudoePHEDrine      Take 1 tablet by mouth every morning.       amLODIPine 5 MG tablet    NORVASC    90 tablet    Take 1 tablet (5 mg) by mouth daily       atorvastatin 40 MG tablet    LIPITOR    90 tablet    Take 1 tablet (40 mg) by mouth daily       esomeprazole 40 MG CR capsule    nexIUM    90 capsule    Take 1 capsule (40 mg) by mouth daily       guaiFENesin-dextromethorphan 100-10 MG/5ML syrup    ROBITUSSIN DM    560 mL    Take 5 mLs by mouth every 4 hours as needed for cough       hydrocortisone 1 % cream    CORTAID     Apply topically 2 times daily as needed Reported on 3/3/2017       ibuprofen 800 MG tablet    ADVIL/MOTRIN    60 tablet    Take 1 tablet (800 mg) by mouth every 8 hours as needed for pain       lamoTRIgine 25 MG tablet    LaMICtal    180 tablet    Take 3 tablets (75 mg) by mouth 2 times daily       levETIRAcetam 500 MG tablet    KEPPRA    210 tablet    Take 2.5 tabs (1250mg) by mouth twice daily       magnesium oxide 400 MG tablet    MAG-OX    90 tablet    Take 1 tablet (400 mg) by mouth daily       NASONEX 50 MCG/ACT spray   Generic drug:  mometasone      2 sprays by Both Nostrils route daily.       sertraline 100 MG tablet    ZOLOFT    90 tablet    Take 1 tablet (100 mg) by mouth daily       THERAFLU FLU/COLD/COUGH PO      Reported on 3/3/2017       ZYRTEC 10 MG tablet   Generic drug:  cetirizine      Take 10 mg by mouth  every evening.

## 2017-05-22 NOTE — Clinical Note
Thank you for allowing me to see your patient in Sports Medicine Clinic.  Please see the attached copy of our visit.  Sincerely,  Erik Rojo MD

## 2017-05-30 ENCOUNTER — OFFICE VISIT (OUTPATIENT)
Dept: ORTHOPEDICS | Facility: CLINIC | Age: 57
End: 2017-05-30

## 2017-05-30 VITALS — HEIGHT: 60 IN | RESPIRATION RATE: 16 BRPM | WEIGHT: 166 LBS | BODY MASS INDEX: 32.59 KG/M2

## 2017-05-30 DIAGNOSIS — M54.32 SCIATICA OF LEFT SIDE: Primary | ICD-10-CM

## 2017-05-30 NOTE — MR AVS SNAPSHOT
After Visit Summary   5/30/2017    Hattie Mosher    MRN: [id number]           Patient Information     Date Of Birth          1960        Visit Information        Provider Department      5/30/2017 2:45 PM Erik Rojo MD [facility] Sports Medicine        Today's Diagnoses     Sciatica of left side    -  1       Follow-ups after your visit        Additional Services     [facility] PT, HAND, AND CHIROPRACTIC REFERRAL       === This order will print in the [facility] Scheduling  Office ===    Physical therapy, hand therapy and chiropractic care are available through:    [facility]  [facility]    Call one easy number to schedule at any of the above locations:  [phone].    Your provider has referred you to Physical Therapy at [facility] or [facility] at [facility]    Indication/Reason for Referral: Low Back Pain with left radicular leg pain  Onset of Illness:  Recurrent since 2011  Therapy Orders:  Evaluate and Treat  Special Programs:  None  Special Request:  None    Additional Comments for the therapist or chiropractor:  Lumbar maintenance strength pgm.  Lumbar traction trial, home traction if helpful.  Pt may be interested in pilates reformer approach   8-10 visits    Please be aware that coverage of these services is subject to the terms and limitations of your health insurance plan.  Call member services at your health plan with any benefit or coverage questions.      Please bring the following to your appointment:    >>   Your personal calendar for scheduling future appointments  >>   Comfortable clothing                  Your next 10 appointments already scheduled     Jun 19, 2017 12:00 PM CDT   New Sleep Patient with Snigdhasmrithi Gilles Vargas MD   [facility] [place], Sleep Study ([facility])    [address] [place] [address]   [phone]            Aug 10, 2017  3:05 PM  CDT   (Arrive by 2:50 PM)   Return Visit with Yg Sharp MD   [facility] ([facility])    [address] [facility]  4th [facility] [address] [phone]              Who to contact     Please call your clinic at [phone] to:    Ask questions about your health    Make or cancel appointments    Discuss your medicines    Learn about your test results    Speak to your doctor   If you have compliments or concerns about an experience at your clinic, or if you wish to file a complaint, please contact [facility] Physicians Patient Relations at [phone] or email us at [email or web address]         Additional Information About Your Visit        [employer] Information     [employer] gives you secure access to your electronic health record. If you see a primary care provider, you can also send messages to your care team and make appointments. If you have questions, please call your primary care clinic.  If you do not have a primary care provider, please call [phone] and they will assist you.      [employer] is an electronic gateway that provides easy, online access to your medical records. With [employer], you can request a clinic appointment, read your test results, renew a prescription or communicate with your care team.     To access your existing account, please contact your [facility] or call [phone] for assistance.        Care EveryWhere ID     This is your Care EveryWhere ID. This could be used by other organizations to access your [facility] medical records  [id number]        Your Vitals Were     Respirations Height BMI (Body Mass Index)             16 5' (1.524 m) 32.42 kg/m2          Blood Pressure from Last 3 Encounters:   05/04/17 122/84   03/03/17 128/82   08/05/16 158/78    Weight from Last 3 Encounters:   05/30/17 166 lb (75.3 kg)   05/04/17 166 lb 11.2 oz (75.6 kg)   03/03/17 166 lb (75.3  kg)              We Performed the Following     [facility] PT, HAND, AND CHIROPRACTIC REFERRAL        Primary Care Provider Office Phone # Fax #    Yg Sharp [phone]       PRIMARY CARE CENTER [address] [place] [address]        Thank you!     Thank you for choosing [facility]  for your care. Our goal is always to provide you with excellent care. Hearing back from our patients is one way we can continue to improve our services. Please take a few minutes to complete the written survey that you may receive in the mail after your visit with us. Thank you!             Your Updated Medication List - Protect others around you: Learn how to safely use, store and throw away your medicines at www.disposemymeds.org.          This list is accurate as of: 5/30/17 11:59 PM.  Always use your most recent med list.                   Brand Name Dispense Instructions for use    ALLEGRA-D 12 HOUR  MG per 12 hr tablet   Generic drug:  fexofenadine-pseudoePHEDrine      Take 1 tablet by mouth every morning.       amLODIPine 5 MG tablet    NORVASC    90 tablet    Take 1 tablet (5 mg) by mouth daily       atorvastatin 40 MG tablet    LIPITOR    90 tablet    Take 1 tablet (40 mg) by mouth daily       esomeprazole 40 MG CR capsule    nexIUM    90 capsule    Take 1 capsule (40 mg) by mouth daily       guaiFENesin-dextromethorphan 100-10 MG/5ML syrup    ROBITUSSIN DM    560 mL    Take 5 mLs by mouth every 4 hours as needed for cough       hydrocortisone 1 % cream    CORTAID     Apply topically 2 times daily as needed Reported on 3/3/2017       ibuprofen 800 MG tablet    ADVIL/MOTRIN    60 tablet    Take 1 tablet (800 mg) by mouth every 8 hours as needed for pain       lamoTRIgine 25 MG tablet    LaMICtal    180 tablet    Take 3 tablets (75 mg) by mouth 2 times daily       levETIRAcetam 500 MG tablet    KEPPRA    210 tablet    Take 2.5 tabs (1250mg) by mouth twice daily       magnesium  oxide 400 MG tablet    MAG-OX    90 tablet    Take 1 tablet (400 mg) by mouth daily       NASONEX 50 MCG/ACT spray   Generic drug:  mometasone      2 sprays by Both Nostrils route daily.       sertraline 100 MG tablet    ZOLOFT    90 tablet    Take 1 tablet (100 mg) by mouth daily       THERAFLU FLU/COLD/COUGH PO      Reported on 3/3/2017       ZYRTEC 10 MG tablet   Generic drug:  cetirizine      Take 10 mg by mouth every evening.

## 2017-05-30 NOTE — LETTER
5/30/2017      RE: Hattie Mosher  [address] [facility] [facility] [address]       May 30, 2017: Hattie Mosher is a 56 year old female who is seen in f/u up for MRI on 5/26/17 on lumbar spine.  Low back and left radicular leg pain, recurrent since 2011    MR LUMBAR SPINE W/O CONTRAST 5/26/2017 5:18 PM     History: Lumbago with sciatica, left side, Other chronic pain.  Comparison: Lumbar spine MRI from 2011     Technique: Sagittal STIR, diffusion weighted, T1-weighted, T2-weighted  and axial T2-weighted images of the lumbar spine were obtained without  intravenous contrast.      Findings: There are 5 lumbar-type vertebrae assumed for the purposes  of this dictation.  The tip of the conus medullaris is at  L1.  The  lumbar vertebral column appears normally aligned.  There is multilevel  disc height narrowing at L3-4 L4-5 and S1 with disc desiccation. Bone  marrow signal intensity is within normal limits and no abnormal signal  is visible.      On a level by level basis:     L1-2: No significant spinal canal or foraminal narrowing.     L2-3: Mild disc bulge without any significant spinal canal or  foraminal stenosis..     L3-4: Disc bulge, small left subarticular disc protrusion which abuts  the exiting left L3 nerve root. Mild degenerative facet joint  arthropathy resulting in mild right/mild to moderate left foraminal  narrowing. Mild effacement of the left lateral recess. The spinal  canal is mildly narrowed.     L4-5: Circumferential disc bulge with superimposed bilateral  subarticular disc protrusions which extend into the neural foramen and  abut the exiting L4 nerve roots. There is mild to moderate bilateral  neural foraminal stenosis.     L5-S1: Asymmetric disc bulge eccentric to the left and accompanying  moderate degenerative facet joint arthropathy. No significant spinal  canal stenosis, however there is severe left foraminal stenosis with  likely impingement of the exiting left L5 nerve root and  mild right  foraminal narrowing.      Visualized portions of the retroperitoneal structures are  unremarkable..         Impression: Multilevel degenerative changes which are most significant  at L5-S1 as detailed above     I have personally reviewed the examination and initial interpretation  and I agree with the findings.     MELISSA ALDRICH MD        [facility]:  Past Medical History:   Diagnosis Date     Apnea, sleep 2007    reeval 12/11 Mild NOELLE     Essential hypertension 6/7/2013     Migraine headaches      Seizure disorder, complex partial (H)      Seizures (H)        Active problem list:  Patient Active Problem List   Diagnosis     Generalized convulsive epilepsy (H)     NOELLE (obstructive sleep apnea)     Perforation of tympanic membrane     Epilepsy (H)     Hypertrophic and atrophic condition of skin     Hyponatremia     Essential hypertension     Low back pain     SIADH (syndrome of inappropriate ADH production): probable med-induced       FH:  Family History   Problem Relation Age of Onset     Neurologic Disorder Mother      MS     Cardiovascular Father      Aneurysm     Glaucoma Brother      Retinal detachment No family hx of      Macular Degeneration No family hx of        SH:  Social History     Social History     Marital status:      Spouse name: N/A     Number of children: N/A     Years of education: N/A     Occupational History      [employer]     Social History Main Topics     Smoking status: Never Smoker     Smokeless tobacco: Never Used     Alcohol use 1.2 oz/week     2 Standard drinks or equivalent per week      Comment: 1-2 drinks/week     Drug use: No     Sexual activity: Yes     Partners: Male     Other Topics Concern     Caffeine Concern Yes     Drinks several cups of coffee/day     Sleep Concern Yes     Reports history of untreated sleep apnea     Social History Narrative       MEDS:  See EMR, reviewed  ALL:  See EMR, reviewed    REVIEW OF SYSTEMS:  CONSTITUTIONAL:NEGATIVE for  fever, chills, change in weight  INTEGUMENTARY/SKIN: NEGATIVE for worrisome rashes, moles or lesions  EYES: NEGATIVE for vision changes or irritation  ENT/MOUTH: NEGATIVE for ear, mouth and throat problems  RESP:NEGATIVE for significant cough or SOB  BREAST: NEGATIVE for masses, tenderness or discharge  CV: NEGATIVE for chest pain, palpitations or peripheral edema  GI: NEGATIVE for nausea, abdominal pain, heartburn, or change in bowel habits  :NEGATIVE for frequency, dysuria, or hematuria  :NEGATIVE for frequency, dysuria, or hematuria  NEURO: NEGATIVE for weakness, dizziness or paresthesias  ENDOCRINE: NEGATIVE for temperature intolerance, skin/hair changes  HEME/ALLERGY/IMMUNE: NEGATIVE for bleeding problems  PSYCHIATRIC: NEGATIVE for changes in mood or affect      OBJECTIVE:  Her exam is unchanged with low back discomfort and intermittent episodes of left-sided leg discomfort.  Strength is intact at the hip, knee, ankle and foot.  Sensation is intact as well, intermittent numbness and tingling.  Distal pulses intact.  Overlying skin is normal.  Appropriate in affect and conversation.      ASSESSMENT:  Lumbar spine degenerative disk and joint disease with left-sided sciatica complaints.      PLAN:  We went over MRI results in detail.  She knows there has been a change compared to her MRI in 2011.  Epidural injection offered, declined.  The patient would like to try physical therapy for a strengthening program and attempt at lumbar traction, even education on a Pilates reformer approach to back maintenance and she would like to see if a health club that she had access to would have a Pilates reformer machine.  If it is not helpful, she knows an epidural could be considered and also a back surgical referral.  She would like to avoid each of these for now.         Erik Rojo MD

## 2017-06-13 ENCOUNTER — THERAPY VISIT (OUTPATIENT)
Dept: PHYSICAL THERAPY | Facility: CLINIC | Age: 57
End: 2017-06-13
Payer: COMMERCIAL

## 2017-06-13 DIAGNOSIS — M54.50 LUMBAGO: Primary | ICD-10-CM

## 2017-06-13 PROCEDURE — 97110 THERAPEUTIC EXERCISES: CPT | Mod: GP | Performed by: PHYSICAL THERAPIST

## 2017-06-13 PROCEDURE — 97161 PT EVAL LOW COMPLEX 20 MIN: CPT | Mod: GP | Performed by: PHYSICAL THERAPIST

## 2017-06-13 PROCEDURE — 97530 THERAPEUTIC ACTIVITIES: CPT | Mod: GP | Performed by: PHYSICAL THERAPIST

## 2017-06-13 NOTE — MR AVS SNAPSHOT
After Visit Summary   6/13/2017    Hattie Mosher    MRN: [id number]           Patient Information     Date Of Birth          1960        Visit Information        Provider Department      6/13/2017 4:40 PM Cornelio Chan, PT [facility]        Today's Diagnoses     Lumbago    -  1       Follow-ups after your visit        Your next 10 appointments already scheduled     Rick 15, 2017 11:30 AM CDT   MELISSA Spine with Cornelio Chan, PT   [facility] ( [facility])    [address] [place] [id number] [phone]            Jun 19, 2017 12:00 PM CDT   New Sleep Patient with David Vargas MD   [facility] [place], Sleep Study ([facility])    [address] [place] [id number] [phone]            Jun 23, 2017  3:50 PM CDT   MELISSA Spine with Matt Lawrence PT   [facility] ( [facility])    [address] [place] [id number] [phone]            Aug 10, 2017  3:05 PM CDT   (Arrive by 2:50 PM)   Return Visit with Yg Sharp MD   [facility] ([facility])    [address] [address] [phone]              Who to contact     If you have questions or need follow up information about today's clinic visit or your schedule please contact [facility] directly at [phone].  Normal or non-critical lab and imaging results will be communicated to you by MyChart, letter or phone within 4 business days after the clinic has received the results. If you do not hear from us within 7 days, please contact the clinic through MyChart or phone. If you have a critical or abnormal lab result, we will notify you by phone as soon as  possible.  Submit refill requests through Handy or call your pharmacy and they will forward the refill request to us. Please allow 3 business days for your refill to be completed.          Additional Information About Your Visit        [employer] Information     Handy gives you secure access to your electronic health record. If you see a primary care provider, you can also send messages to your care team and make appointments. If you have questions, please call your primary care clinic.  If you do not have a primary care provider, please call [phone] and they will assist you.        Care EveryWhere ID     This is your Care EveryWhere ID. This could be used by other organizations to access your [place] medical records  [id number]         Blood Pressure from Last 3 Encounters:   05/04/17 122/84   03/03/17 128/82   08/05/16 158/78    Weight from Last 3 Encounters:   05/30/17 75.3 kg (166 lb)   05/04/17 75.6 kg (166 lb 11.2 oz)   03/03/17 75.3 kg (166 lb)              We Performed the Following     PT Eval, Low Complexity (65[address])     Therapeutic Activities     Therapeutic Exercises        Primary Care Provider Office Phone # Fax #    Yg Sharp [phone]       [facility] [address] [place] [id number]        Thank you!     Thank you for choosing [facility]  for your care. Our goal is always to provide you with excellent care. Hearing back from our patients is one way we can continue to improve our services. Please take a few minutes to complete the written survey that you may receive in the mail after your visit with us. Thank you!             Your Updated Medication List - Protect others around you: Learn how to safely use, store and throw away your medicines at www.disposemymeds.org.          This list is accurate as of: 6/13/17 11:59 PM.  Always use your most recent med list.                   Brand Name Dispense  Instructions for use    ALLEGRA-D 12 HOUR  MG per 12 hr tablet   Generic drug:  fexofenadine-pseudoePHEDrine      Take 1 tablet by mouth every morning.       amLODIPine 5 MG tablet    NORVASC    90 tablet    Take 1 tablet (5 mg) by mouth daily       atorvastatin 40 MG tablet    LIPITOR    90 tablet    Take 1 tablet (40 mg) by mouth daily       esomeprazole 40 MG CR capsule    nexIUM    90 capsule    Take 1 capsule (40 mg) by mouth daily       guaiFENesin-dextromethorphan 100-10 MG/5ML syrup    ROBITUSSIN DM    560 mL    Take 5 mLs by mouth every 4 hours as needed for cough       hydrocortisone 1 % cream    CORTAID     Apply topically 2 times daily as needed Reported on 3/3/2017       ibuprofen 800 MG tablet    ADVIL/MOTRIN    60 tablet    Take 1 tablet (800 mg) by mouth every 8 hours as needed for pain       lamoTRIgine 25 MG tablet    LaMICtal    180 tablet    Take 3 tablets (75 mg) by mouth 2 times daily       levETIRAcetam 500 MG tablet    KEPPRA    210 tablet    Take 2.5 tabs (1250mg) by mouth twice daily       magnesium oxide 400 MG tablet    MAG-OX    90 tablet    Take 1 tablet (400 mg) by mouth daily       NASONEX 50 MCG/ACT spray   Generic drug:  mometasone      2 sprays by Both Nostrils route daily.       sertraline 100 MG tablet    ZOLOFT    90 tablet    Take 1 tablet (100 mg) by mouth daily       THERAFLU FLU/COLD/COUGH PO      Reported on 3/3/2017       ZYRTEC 10 MG tablet   Generic drug:  cetirizine      Take 10 mg by mouth every evening.

## 2017-06-13 NOTE — PROGRESS NOTES
Subjective:    Patient is a 56 year old female presenting with rehab back hpi.   Hattie Mosher is a 56 year old female with a lumbar condition.  Condition occurred with:  Degenerative joint disease.  Condition occurred: at home.    Pt presents to PT with c/o chronic LBP and L leg discomfort since 2011.  The pain was not improving so she saw the MD on 5-22-17 and is now referred to PT for further care.  Pt would like to trail strengthening and traction prior to receiving and epidural injection.    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    Site of Pain: L SI joint pain   Radiates to: Pain all the way down into the L leg.  Pain is described as stabbing and is intermittent and reported as 3/10.  Associated symptoms:  Loss of motion/stiffness and loss of strength. Pain is the same all the time.  Exacerbated by: Walking, bending, sitting, standing. and relieved by rest and ice.      Previous treatment includes chiropractic (1x every 2 - 4 weeks cracking back and neck).  There was mild improvement following previous treatment.  General health as reported by patient is good.                                              Objective:    System         Lumbar/SI Evaluation  ROM:    AROM Lumbar:   Flexion:             75%, relieves  Ext:                    50%, ++   Side Bend:        Left:  100%    Right:  100%, +  Rotation:           Left:  100%, +++    Right:  100%, +++  Side Glide:        Left:     Right:           Strength: Decreased TrA activation          Neural Tension/Mobility:  Neural tension wnl lumbar: + L neural tension.          Functional Tests:  Core strength and proprioception lumbar: Squat anterior excursion, SLS L 2-3 seconds, R 3-5s.            SI joint/Sacrum:    + L ASLR                                            Hip Evaluation    Hip Strength:        Abduction:  Left: 4-/5     Pain:Right: 4-/5    Pain:                                      General Evaluation:                              Gait:  Gait wnl general: Mild compenstated trandelumburg B.                                         ROS    Assessment/Plan:      Patient is a 56 year old female with lumbar complaints.    Patient has the following significant findings with corresponding treatment plan.                Diagnosis 1:  LBP  Pain -  hot/cold therapy  Decreased ROM/flexibility - manual therapy and therapeutic exercise  Decreased joint mobility - manual therapy and therapeutic exercise  Decreased strength - therapeutic exercise and therapeutic activities  Impaired balance - neuro re-education and therapeutic activities  Decreased proprioception - neuro re-education and therapeutic activities  Impaired gait - gait training  Impaired muscle performance - neuro re-education  Decreased function - therapeutic activities  Impaired posture - neuro re-education    Therapy Evaluation Codes:   1) History comprised of:   Personal factors that impact the plan of care:      None.    Comorbidity factors that impact the plan of care are:      None.     Medications impacting care: None.  2) Examination of Body Systems comprised of:   Body structures and functions that impact the plan of care:      Hip and Lumbar spine.   Activity limitations that impact the plan of care are:      Lifting, Squatting/kneeling, Stairs, Standing, Walking and Working.  3) Clinical presentation characteristics are:   Stable/Uncomplicated.  4) Decision-Making    Low complexity using standardized patient assessment instrument and/or measureable assessment of functional outcome.  Cumulative Therapy Evaluation is: Low complexity.    Previous and current functional limitations:  (See Goal Flow Sheet for this information)    Short term and Long term goals: (See Goal Flow Sheet for this information)     Communication ability:  Patient appears to be able to clearly communicate and understand verbal and written communication  and follow directions correctly.  Treatment Explanation - The following has been discussed with the patient:   RX ordered/plan of care  Anticipated outcomes  Possible risks and side effects  This patient would benefit from PT intervention to resume normal activities.   Rehab potential is excellent.    Frequency:  1 X week, once daily  Duration:  for 6 weeks  Discharge Plan:  Achieve all LTG.  Independent in home treatment program.  Return to work with or without restrictions.  Return to previous functional level by discharge.  Reach maximal therapeutic benefit.    Please refer to the daily flowsheet for treatment today, total treatment time and time spent performing 1:1 timed codes.

## 2017-06-15 ENCOUNTER — THERAPY VISIT (OUTPATIENT)
Dept: PHYSICAL THERAPY | Facility: CLINIC | Age: 57
End: 2017-06-15
Payer: COMMERCIAL

## 2017-06-15 DIAGNOSIS — M54.50 LUMBAGO: ICD-10-CM

## 2017-06-15 PROCEDURE — 97530 THERAPEUTIC ACTIVITIES: CPT | Mod: GP | Performed by: PHYSICAL THERAPIST

## 2017-06-15 PROCEDURE — 97112 NEUROMUSCULAR REEDUCATION: CPT | Mod: GP | Performed by: PHYSICAL THERAPIST

## 2017-06-15 PROCEDURE — 97110 THERAPEUTIC EXERCISES: CPT | Mod: GP | Performed by: PHYSICAL THERAPIST

## 2017-06-20 ENCOUNTER — THERAPY VISIT (OUTPATIENT)
Dept: PHYSICAL THERAPY | Facility: CLINIC | Age: 57
End: 2017-06-20
Payer: COMMERCIAL

## 2017-06-20 DIAGNOSIS — M54.50 LUMBAGO: ICD-10-CM

## 2017-06-20 PROCEDURE — 97110 THERAPEUTIC EXERCISES: CPT | Mod: GP | Performed by: PHYSICAL THERAPIST

## 2017-06-20 PROCEDURE — 97530 THERAPEUTIC ACTIVITIES: CPT | Mod: GP | Performed by: PHYSICAL THERAPIST

## 2017-06-20 PROCEDURE — 97012 MECHANICAL TRACTION THERAPY: CPT | Mod: GP | Performed by: PHYSICAL THERAPIST

## 2017-06-23 ENCOUNTER — THERAPY VISIT (OUTPATIENT)
Dept: PHYSICAL THERAPY | Facility: CLINIC | Age: 57
End: 2017-06-23
Payer: COMMERCIAL

## 2017-06-23 DIAGNOSIS — G89.29 CHRONIC LEFT-SIDED LOW BACK PAIN WITH LEFT-SIDED SCIATICA: ICD-10-CM

## 2017-06-23 DIAGNOSIS — M54.42 CHRONIC LEFT-SIDED LOW BACK PAIN WITH LEFT-SIDED SCIATICA: ICD-10-CM

## 2017-06-23 PROCEDURE — 97110 THERAPEUTIC EXERCISES: CPT | Mod: GP | Performed by: PHYSICAL THERAPIST

## 2017-06-23 PROCEDURE — 97530 THERAPEUTIC ACTIVITIES: CPT | Mod: GP | Performed by: PHYSICAL THERAPIST

## 2017-06-23 PROCEDURE — 97012 MECHANICAL TRACTION THERAPY: CPT | Mod: GP | Performed by: PHYSICAL THERAPIST

## 2017-07-01 ENCOUNTER — HEALTH MAINTENANCE LETTER (OUTPATIENT)
Age: 57
End: 2017-07-01

## 2017-07-07 ENCOUNTER — THERAPY VISIT (OUTPATIENT)
Dept: PHYSICAL THERAPY | Facility: CLINIC | Age: 57
End: 2017-07-07
Payer: COMMERCIAL

## 2017-07-07 DIAGNOSIS — M54.42 CHRONIC LEFT-SIDED LOW BACK PAIN WITH LEFT-SIDED SCIATICA: ICD-10-CM

## 2017-07-07 DIAGNOSIS — G89.29 CHRONIC LEFT-SIDED LOW BACK PAIN WITH LEFT-SIDED SCIATICA: ICD-10-CM

## 2017-07-07 PROCEDURE — 97110 THERAPEUTIC EXERCISES: CPT | Mod: GP | Performed by: PHYSICAL THERAPIST

## 2017-07-07 PROCEDURE — 97530 THERAPEUTIC ACTIVITIES: CPT | Mod: GP | Performed by: PHYSICAL THERAPIST

## 2017-07-11 ENCOUNTER — THERAPY VISIT (OUTPATIENT)
Dept: PHYSICAL THERAPY | Facility: CLINIC | Age: 57
End: 2017-07-11
Payer: COMMERCIAL

## 2017-07-11 DIAGNOSIS — G89.29 CHRONIC LEFT-SIDED LOW BACK PAIN WITH LEFT-SIDED SCIATICA: ICD-10-CM

## 2017-07-11 DIAGNOSIS — M54.42 CHRONIC LEFT-SIDED LOW BACK PAIN WITH LEFT-SIDED SCIATICA: ICD-10-CM

## 2017-07-11 PROCEDURE — 97530 THERAPEUTIC ACTIVITIES: CPT | Mod: GP | Performed by: PHYSICAL THERAPIST

## 2017-07-11 PROCEDURE — 97110 THERAPEUTIC EXERCISES: CPT | Mod: GP | Performed by: PHYSICAL THERAPIST

## 2017-07-11 NOTE — MR AVS SNAPSHOT
After Visit Summary   7/11/2017    Hattie Mosher    MRN: [id number]           Patient Information     Date Of Birth          1960        Visit Information        Provider Department      7/11/2017 9:40 AM Matt Lawrence PT [facility]        Today's Diagnoses     Chronic left-sided low back pain with left-sided sciatica           Follow-ups after your visit        Your next 10 appointments already scheduled     Aug 07, 2017  8:00 AM CDT   New Sleep Patient with David Vargas MD   [facility] [place], Sleep Study ([facility])    [address] [place] [id number] [phone]            Aug 07, 2017 11:00 AM CDT   MELISSA Spine with Matt Lawrence PT   [facility] ( [facility])    [address]  [address] [address] [phone]            Aug 10, 2017  3:05 PM CDT   (Arrive by 2:50 PM)   Return Visit with Yg Sharp MD   [facility] ([facility])    [address]  4th Floor  [facility] [place] [address] [phone]              Who to contact     If you have questions or need follow up information about today's clinic visit or your schedule please contact [facility] [place] directly at [phone].  Normal or non-critical lab and imaging results will be communicated to you by MyChart, letter or phone within 4 business days after the clinic has received the results. If you do not hear from us within 7 days, please contact the clinic through MyChart or phone. If you have a critical or abnormal lab result, we will notify you by phone as soon as possible.  Submit refill requests through [employer] or call your pharmacy and they will forward the refill request to us. Please allow 3 business days for your refill to be completed.           Additional Information About Your Visit        MyChart Information     [employer] gives you secure access to your electronic health record. If you see a primary care provider, you can also send messages to your care team and make appointments. If you have questions, please call your primary care clinic.  If you do not have a primary care provider, please call [phone] and they will assist you.        Care EveryWhere ID     This is your Care EveryWhere ID. This could be used by other organizations to access your [place] medical records  [id number]         Blood Pressure from Last 3 Encounters:   05/04/17 122/84   03/03/17 128/82   08/05/16 158/78    Weight from Last 3 Encounters:   05/30/17 75.3 kg (166 lb)   05/04/17 75.6 kg (166 lb 11.2 oz)   03/03/17 75.3 kg (166 lb)              We Performed the Following     THERAPEUTIC ACTIVITIES     THERAPEUTIC EXERCISES        Primary Care Provider Office Phone # Fax #    Yg Sharp [phone]       [facility] [address] [place] [address]        Equal Access to Services     RIVERA SAHNI : Hadii shwetha ku hadasho Soganesh, waaxda luqadaha, qaybta kaalmada bud, carlota singleton. So [facility] [phone].    ATENCIÓN: Si habla español, tiene a luu disposición servicios gratPMW Technologiesos de asistencia lingüística. Jermain al [phone].    We comply with applicable federal civil rights laws and [place] laws. We do not discriminate on the basis of race, color, national origin, age, disability sex, sexual orientation or gender identity.            Thank you!     Thank you for choosing [facility] [place]  for your care. Our goal is always to provide you with excellent care. Hearing back from our patients is one way we can continue to improve our services. Please take a few minutes to complete the written survey that you may receive in the mail after your visit with us. Thank you!              Your Updated Medication List - Protect others around you: Learn how to safely use, store and throw away your medicines at www.disposemymeds.org.          This list is accurate as of: 7/11/17 10:36 AM.  Always use your most recent med list.                   Brand Name Dispense Instructions for use Diagnosis    ALLEGRA-D 12 HOUR  MG per 12 hr tablet   Generic drug:  fexofenadine-pseudoePHEDrine      Take 1 tablet by mouth every morning.        amLODIPine 5 MG tablet    NORVASC    90 tablet    Take 1 tablet (5 mg) by mouth daily    Benign essential hypertension       atorvastatin 40 MG tablet    LIPITOR    90 tablet    Take 1 tablet (40 mg) by mouth daily    High blood cholesterol       esomeprazole 40 MG CR capsule    nexIUM    90 capsule    Take 1 capsule (40 mg) by mouth daily    Gastroesophageal reflux disease without esophagitis       guaiFENesin-dextromethorphan 100-10 MG/5ML syrup    ROBITUSSIN DM    560 mL    Take 5 mLs by mouth every 4 hours as needed for cough    Cough       hydrocortisone 1 % cream    CORTAID     Apply topically 2 times daily as needed Reported on 3/3/2017        ibuprofen 800 MG tablet    ADVIL/MOTRIN    60 tablet    Take 1 tablet (800 mg) by mouth every 8 hours as needed for pain    Headache, unspecified headache type       lamoTRIgine 25 MG tablet    LaMICtal    180 tablet    Take 3 tablets (75 mg) by mouth 2 times daily    Partial symptomatic epilepsy with complex partial seizures, intractable, without status epilepticus (H), Generalized convulsive epilepsy (H)       levETIRAcetam 500 MG tablet    KEPPRA    210 tablet    Take 2.5 tabs (1250mg) by mouth twice daily    Generalized convulsive epilepsy (H), Partial symptomatic epilepsy with complex partial seizures, intractable, without status epilepticus (H)       magnesium oxide 400 MG tablet    MAG-OX    90 tablet    Take 1 tablet (400 mg) by mouth daily    Hypomagnesemia       NASONEX 50 MCG/ACT spray   Generic drug:   mometasone      2 sprays by Both Nostrils route daily.        sertraline 100 MG tablet    ZOLOFT    90 tablet    Take 1 tablet (100 mg) by mouth daily    Major depressive disorder, recurrent episode, moderate (H)       THERAFLU FLU/COLD/COUGH PO      Reported on 3/3/2017        ZYRTEC 10 MG tablet   Generic drug:  cetirizine      Take 10 mg by mouth every evening.

## 2017-07-11 NOTE — PROGRESS NOTES
PROGRESS REPORT    Hattie has been in therapy from June 13, 2017 to Jul 11, 2017 for treatment of LBP.    Therapist Impression:  Hattie returns to therapy after trialing repeated extensions with maybe some improvement.  It is really difficult to establish a directional preference as it seems some of her pain may be from stenosis in which she benefits from flexion and she may be presenting with some disc type pain in that case she would benefit from extension.  At this point, she will perform both for home and we are introducing nerve flossing as well as she does present with a positive SLR.  She will self manage while she is on vacation for 3 weeks and reassess he progress.  If she is still having issues, she will follow up with Dr. Rojo for further evaluation.    Subjective:  Subjective: Maybe slightly better but tough to say.    Objective:  Objective: Repeated extension in prone improved pain; tightness continues to be present.  Pain with spring testing L5-S1, negative sacral thrust.     Repeated flexion in supine improved tightness in leg.  Positive SLR test at 40 degrees on L.  Pain with L SLR on R; pain eliminated after repeated flexion    ASSESSMENT/PLAN  Updated problem list and treatment plan: The encounter diagnosis was Chronic left-sided low back pain with left-sided sciatica. Pain - HEP  Decreased ROM/flexibility - HEP  Decreased function - HEP  Decreased strength - HEP  Impaired muscle performance - HEP  STG/LTGs have been met or progress has been made towards goals:  None  Assessment of Progress: The patient's condition has potential to improve.  Self Management Plans:  Patient has been instructed in a home treatment program.  Patient  has been instructed in self management of symptoms.  I have re-evaluated this patient and find that the nature, scope, duration and intensity of the therapy is appropriate for the medical condition of the patient.  Hattie continues to require the following intervention to  meet STG and LTG's:  PT    Recommendations:  This patient would benefit from continued therapy.     Frequency:  1 X week, once daily  Duration:  for 4 weeks.  Refer to MD sooner if not progress is being made.              Please refer to the daily flowsheet for treatment today, total treatment time and time spent performing 1:1 timed codes.

## 2017-08-07 ENCOUNTER — OFFICE VISIT (OUTPATIENT)
Dept: SLEEP MEDICINE | Facility: CLINIC | Age: 57
End: 2017-08-07
Attending: FAMILY MEDICINE
Payer: COMMERCIAL

## 2017-08-07 VITALS
RESPIRATION RATE: 18 BRPM | HEART RATE: 80 BPM | HEIGHT: 60 IN | BODY MASS INDEX: 34.36 KG/M2 | OXYGEN SATURATION: 94 % | WEIGHT: 175 LBS

## 2017-08-07 DIAGNOSIS — G47.33 OSA (OBSTRUCTIVE SLEEP APNEA): ICD-10-CM

## 2017-08-07 DIAGNOSIS — R53.82 CHRONIC FATIGUE: ICD-10-CM

## 2017-08-07 DIAGNOSIS — R53.82 CHRONIC FATIGUE: Primary | ICD-10-CM

## 2017-08-07 LAB — TSH SERPL DL<=0.005 MIU/L-ACNC: 1.83 MU/L (ref 0.4–4)

## 2017-08-07 PROCEDURE — 84443 ASSAY THYROID STIM HORMONE: CPT | Performed by: INTERNAL MEDICINE

## 2017-08-07 PROCEDURE — 36415 COLL VENOUS BLD VENIPUNCTURE: CPT | Performed by: INTERNAL MEDICINE

## 2017-08-07 PROCEDURE — 99211 OFF/OP EST MAY X REQ PHY/QHP: CPT | Mod: 25

## 2017-08-07 NOTE — MR AVS SNAPSHOT
After Visit Summary   8/7/2017    Hattie Mosher    MRN: [id number]           Patient Information     Date Of Birth          1960        Visit Information        Provider Department      8/7/2017 8:00 AM David Vargas MD [facility], [place], Sleep Study        Today's Diagnoses     Chronic fatigue    -  1    NOELLE (obstructive sleep apnea)          Care Instructions      Your BMI is Body mass index is 34.18 kg/(m^2).  Weight management is a personal decision.  If you are interested in exploring weight loss strategies, the following discussion covers the approaches that may be successful. Body mass index (BMI) is one way to tell whether you are at a healthy weight, overweight, or obese. It measures your weight in relation to your height.  A BMI of 18.5 to 24.9 is in the healthy range. A person with a BMI of 25 to 29.9 is considered overweight, and someone with a BMI of 30 or greater is considered obese. More than two-thirds of American adults are considered overweight or obese.  Being overweight or obese increases the risk for further weight gain. Excess weight may lead to heart disease and diabetes.  Creating and following plans for healthy eating and physical activity may help you improve your health.  Weight control is part of healthy lifestyle and includes exercise, emotional health, and healthy eating habits. Careful eating habits lifelong are the mainstay of weight control. Though there are significant health benefits from weight loss, long-term weight loss with diet alone may be very difficult to achieve- studies show long-term success with dietary management in less than 10% of people. Attaining a healthy weight may be especially difficult to achieve in those with severe obesity. In some cases, medications, devices and surgical management might be considered.  What can you do?  If you are overweight or obese and are interested in methods for weight loss, you should  discuss this with your provider.     Consider reducing daily calorie intake by 500 calories.     Keep a food journal.     Avoiding skipping meals, consider cutting portions instead.    Diet combined with exercise helps maintain muscle while optimizing fat loss. Strength training is particularly important for building and maintaining muscle mass. Exercise helps reduce stress, increase energy, and improves fitness. Increasing exercise without diet control, however, may not burn enough calories to loose weight.       Start walking three days a week 10-20 minutes at a time    Work towards walking thirty minutes five days a week     Eventually, increase the speed of your walking for 1-2 minutes at time    In addition, we recommend that you review healthy lifestyles and methods for weight loss available through the National Institutes of Health patient information sites:  http://win.niddk.nih.gov/publications/index.htm    And look into health and wellness programs that may be available through your health insurance provider, employer, local community center, or [employer]ust[employer] club.    Weight management plan: Patient was referred to their PCP to discuss a diet and exercise plan.    Please do not drive or operating heavy machinery ,  if drowsy or sleepy.          Follow-ups after your visit        Follow-up notes from your care team     Return in about 2 weeks (around 8/21/2017).      Your next 10 appointments already scheduled     Aug 10, 2017  3:05 PM CDT   (Arrive by 2:50 PM)   Return Visit with Yg Sharp MD   [facility] ([facility])    [address] [address] [phone]            Aug 17, 2017 11:00 AM CDT   [facility] with [facility] ( [facility])    [facility]  [address]  [facility] [address] [phone]              Future tests that were ordered for  you today     Open Future Orders        Priority Expected Expires Ordered    TSH Routine  10/6/2017 8/7/2017    Comprehensive Sleep Study Routine  2/3/2018 8/7/2017            Who to contact     If you have questions or need follow up information about today's clinic visit or your schedule please [facility] directly at [phone].  Normal or non-critical lab and imaging results will be communicated to you by MyChart, letter or phone within 4 business days after the clinic has received the results. If you do not hear from us within 7 days, please contact the clinic through [employer] or phone. If you have a critical or abnormal lab result, we will notify you by phone as soon as possible.  Submit refill requests through [employer] or call your pharmacy and they will forward the refill request to us. Please allow 3 business days for your refill to be completed.          Additional Information About Your Visit        [employer] Information     [employer] gives you secure access to your electronic health record. If you see a primary care provider, you can also send messages to your care team and make appointments. If you have questions, please call your primary care clinic.  If you do not have a primary care provider, please call [phone] and they will assist you.        Care EveryWhere ID     This is your Care EveryWhere ID. This could be used by other organizations to access your [facility] medical records  [id number]        Your Vitals Were     Pulse Respirations Height Pulse Oximetry BMI (Body Mass Index)       80 18 1.524 m (5') 94% 34.18 kg/m2        Blood Pressure from Last 3 Encounters:   05/04/17 122/84   03/03/17 128/82   08/05/16 158/78    Weight from Last 3 Encounters:   08/07/17 79.4 kg (175 lb)   05/30/17 75.3 kg (166 lb)   05/04/17 75.6 kg (166 lb 11.2 oz)              We Performed the Following     SLEEP EVALUATION & MANAGEMENT REFERRAL - ADULT        Primary Care Provider Office Phone  # Fax #    Yg Sharp [phone]       [facility] [address] [place] [id number]        Equal Access to Services     RIVERA SAHNI : Hadii aad ku hadbhavnaamy Hilaryganesh, waaxda luqadaha, qaybta kaalmada adeegsheldonda, carlota frausto teoeileen lanewilner singleton. So [facility] [phone].    ATENCIÓN: Si habla español, tiene a luu disposición servicios gratuitos de asistencia lingüística. Llame al [phone].    We comply with applicable federal civil rights laws and [place] laws. We do not discriminate on the basis of race, color, national origin, age, disability sex, sexual orientation or gender identity.            Thank you!     Thank you for choosing [facility] [place]EP [facility]  for your care. Our goal is always to provide you with excellent care. Hearing back from our patients is one way we can continue to improve our services. Please take a few minutes to complete the written survey that you may receive in the mail after your visit with us. Thank you!             Your Updated Medication List - Protect others around you: Learn how to safely use, store and throw away your medicines at www.disposemymeds.org.          This list is accurate as of: 8/7/17  9:22 AM.  Always use your most recent med list.                   Brand Name Dispense Instructions for use Diagnosis    ALLEGRA-D 12 HOUR  MG per 12 hr tablet   Generic drug:  fexofenadine-pseudoePHEDrine      Take 1 tablet by mouth every morning.        amLODIPine 5 MG tablet    NORVASC    90 tablet    Take 1 tablet (5 mg) by mouth daily    Benign essential hypertension       atorvastatin 40 MG tablet    LIPITOR    90 tablet    Take 1 tablet (40 mg) by mouth daily    High blood cholesterol       esomeprazole 40 MG CR capsule    nexIUM    90 capsule    Take 1 capsule (40 mg) by mouth daily    Gastroesophageal reflux disease without esophagitis       ibuprofen 800 MG tablet    ADVIL/MOTRIN    60 tablet    Take 1 tablet (800  mg) by mouth every 8 hours as needed for pain    Headache, unspecified headache type       lamoTRIgine 25 MG tablet    LaMICtal    180 tablet    Take 3 tablets (75 mg) by mouth 2 times daily    Partial symptomatic epilepsy with complex partial seizures, intractable, without status epilepticus (H), Generalized convulsive epilepsy (H)       levETIRAcetam 500 MG tablet    KEPPRA    210 tablet    Take 2.5 tabs (1250mg) by mouth twice daily    Generalized convulsive epilepsy (H), Partial symptomatic epilepsy with complex partial seizures, intractable, without status epilepticus (H)       magnesium oxide 400 MG tablet    MAG-OX    90 tablet    Take 1 tablet (400 mg) by mouth daily    Hypomagnesemia       NASONEX 50 MCG/ACT spray   Generic drug:  mometasone      2 sprays by Both Nostrils route daily.        sertraline 100 MG tablet    ZOLOFT    90 tablet    Take 1 tablet (100 mg) by mouth daily    Major depressive disorder, recurrent episode, moderate (H)       ZYRTEC 10 MG tablet   Generic drug:  cetirizine      Take 10 mg by mouth every evening.

## 2017-08-07 NOTE — PATIENT INSTRUCTIONS

## 2017-08-07 NOTE — PROGRESS NOTES
"Sleep Consultation Note:    Date on this visit: 8/7/2017    Hattie Mosher is sent by Dr. Yg Sharp for a sleep consultation regarding follow-up for mild NOELLE.    Primary Physician: Yg Sharp     CC:  \"my PCP thought I should have a follow-up since it has been a while.\"    Hattie Mosher 56 year old female with PMH seizure disorder, depression, HTN, and mild NOELLE who complains persistent snoring and gasping/choking despite use of dental appliance. Patient initially diagnosed with NOELLE in 2006 by [place] [facility]. Patient was last seen in [facility] sleep clinic in 2011. At which time, she had a repeat sleep study completed on 12/8/11 showing mild NOELLE showing AHI of 6.5. She had a CPAP machine, but was not tolerating the mask. It did not fit well and her skin broke out in rashes. It was recommended that she should be fitted for a dental appliance. The patient has not been seen in a sleep clinic since that recommendation was made.     Today, the patient reports having a dental appliance and using it every night. She tolerates it well. She notes that she has not used it during her naps. She continues to report snoring (although not loud enough to bother her bed partner), gasping/choking, occasional morning headaches, and dry mouth. She also notes some daytime fatigue, ESS 12. Of note, patient has formally documented evidence of seizures while sleeping. She reports that she is unsure how many of her symptoms are due to her seizures vs. NOELLE. She notes that she has been retired for 3-4 years now, and believes this has improved both her sleep quality and seizure frequency. Lastly, patient states that her daughter has noticed that the patient's breathing occasionally stops or develops a strange rhythm while she is awake. She denies any witnessed sleep apneic events.       Sleep Disordered Breathing  Hattie does complain of snoring, choking/gasping for air, dry mouth, morning headaches 3x/week, non-refreshing " sleep 3-4x/week, and daytime fatigue.  Hattie does not complain of snort arousals.   Hattei's weight has not changed since her last sleep study on 12/8/11.    Sleep Schedule/Sleep Complaints  She does not complain of difficulty with falling asleep. During weekdays, Hattie goes to bed at 10:30 PM, and it usually takes 30 minutes to fall asleep. She wakes up at 7-8AM.  On weekends, She falls asleep at 11PM and gets up 8AM.  She would naturally to go to sleep at 10-10:30PM and wake up at 7AM.    Hattie does not complain of restlessness/crawling/antsy feelings in the legs.    She does complain of spontaneous leg movements/jerks when transitioning from wakefullness to sleep. She does not complain of spontaneous leg movements/jerks in the middle of the night.    Patient does not use electronics or work in bed. She does read in bed.   Patient does have a regular bed partner.  Patient sleeps on her side.  Patient does not have any pets in the bedroom at night during sleep.  She does not use a sleep aid.    She does not complain of difficulty with staying asleep. However, she wakes up 2-3 times throughout the night. She feels that she is awake 30-60 minutes during the entire night.   Night time awakenings occur due to need to use the bathroom, but other times she is unsure of the the cause.    Patient does not complain of chronic pain, depression, which affects the maintenance of sleep. Patient does complain of occasional ruminating thoughts, stress/anxiety after particularly stressful day, which affects the maintenance of sleep. This occurs less often now that she has been retired for 3-4 years.  After awakening, She is able to fall back asleep after 10 minutes.    Sleep Behaviors  She denies any cataplexy, sleep paralysis, sleep hallucinations.    She denies any night time behaviors - sleep walking, sleep talking, sleep eating.    She does not complain acting out dreams.    Daytime Functioning  Hattie lays down every day -  during this time she sometimes will nap. She does not use her dental device during naps. She naps for 30-60 minutes and sometimes feels refreshed.     She denies drowsiness while driving.    Patient's [place] Sleepiness score 12/24 consistent with daytime sleepiness.      Social History  Hattie is retired and previously worked in the [employer]. Currently works part time in a school cafeteria. She does not do shift work currently or in the past.  She does drink caffeine: 2 cups of coffee daily, 1 can of pop daily, and occasionally has tea. She does not drink energy drinks. Last caffeine intake is within 6 hours of bed time.  She drinks alcohol socially on the weekends.  Does not use alcohol as a sleep aid.  Patient is a never smoker.  Patient does not use drugs.  She lives  at home. No pets.     Allergies:    Allergies   Allergen Reactions     Nkda [No Known Drug Allergies]      Seasonal Allergies        Medications:    Current Outpatient Prescriptions   Medication Sig Dispense Refill     ibuprofen (ADVIL/MOTRIN) 800 MG tablet Take 1 tablet (800 mg) by mouth every 8 hours as needed for pain 60 tablet 1     lamoTRIgine (LAMICTAL) 25 MG tablet Take 3 tablets (75 mg) by mouth 2 times daily 180 tablet 11     levETIRAcetam (KEPPRA) 500 MG tablet Take 2.5 tabs (1250mg) by mouth twice daily 210 tablet 11     sertraline (ZOLOFT) 100 MG tablet Take 1 tablet (100 mg) by mouth daily 90 tablet 1     atorvastatin (LIPITOR) 40 MG tablet Take 1 tablet (40 mg) by mouth daily 90 tablet 1     esomeprazole (NEXIUM) 40 MG CR capsule Take 1 capsule (40 mg) by mouth daily 90 capsule 1     amLODIPine (NORVASC) 5 MG tablet Take 1 tablet (5 mg) by mouth daily 90 tablet 3     magnesium oxide (MAG-OX) 400 MG tablet Take 1 tablet (400 mg) by mouth daily 90 tablet 3     fexofenadine-pseudoephedrine (ALLEGRA-D 12 HOUR)  MG per tablet Take 1 tablet by mouth every morning.       mometasone (NASONEX) 50 MCG/ACT nasal spray 2  sprays by Both Nostrils route daily.       cetirizine (ZYRTEC) 10 MG tablet Take 10 mg by mouth every evening.         Problem List:  Patient Active Problem List    Diagnosis Date Noted     Lumbago 2017     Priority: Medium     SIADH (syndrome of inappropriate ADH production): probable med-induced 2013     Priority: Medium     Hyponatremia 2013     Priority: Medium     Essential hypertension 2013     Priority: Medium     Low back pain 2013     Priority: Medium     Diagnosis updated by automated process. Provider to review and confirm.       Perforation of tympanic membrane 2012     Priority: Medium     Problem list name updated by automated process. Provider to review       Epilepsy (H) 2012     Priority: Medium     Problem list name updated by automated process. Provider to review       Hypertrophic and atrophic condition of skin 2012     Priority: Medium     Problem list name updated by automated process. Provider to review       NOELLE (obstructive sleep apnea) 2011     Priority: Medium     Generalized convulsive epilepsy (H) 2011     Priority: Medium     Problem list name updated by automated process. Provider to review          Past Medical/Surgical History:  Past Medical History:   Diagnosis Date     Apnea, sleep 2007    reeval  Mild NOELLE     Essential hypertension 2013     Migraine headaches      Seizure disorder, complex partial (H)      Seizures (H)      Past Surgical History:   Procedure Laterality Date      SECTION      3x     COLONOSCOPY  2011    Procedure:COMBINED COLONOSCOPY, REMOVE TUMOR/POLYP/LESION BY SNARE; Surgeon:PAULA BEAN; Location: GI     ENDOSCOPIC RELEASE CARPAL TUNNEL  2013    Procedure: ENDOSCOPIC RELEASE CARPAL TUNNEL;  Left Endoscopic Carpal Tunnel Release;  Surgeon: Kate Najera MD;  Location:  OR     ENDOSCOPIC RELEASE CARPAL TUNNEL  2013    Procedure: ENDOSCOPIC  RELEASE CARPAL TUNNEL;  Right Endoscopic Carpal Tunnel Release  ;  Surgeon: Kate Najera MD;  Location: [facility]     HYSTERECTOMY      Ovaries intact.  No cancer.        Social History:  Social History     Social History     Marital status:      Spouse name: N/A     Number of children: N/A     Years of education: N/A     Occupational History      [employer]     Social History Main Topics     Smoking status: Never Smoker     Smokeless tobacco: Never Used     Alcohol use 1.2 oz/week     2 Standard drinks or equivalent per week      Comment: 1-2 drinks/week     Drug use: No     Sexual activity: Yes     Partners: Male     Other Topics Concern     Caffeine Concern Yes     Drinks several cups of coffee/day     Sleep Concern Yes     Reports history of untreated sleep apnea     Social History Narrative       Family History:  Family History   Problem Relation Age of Onset     Neurologic Disorder Mother      MS     Cardiovascular Father      Aneurysm     Glaucoma Brother      Retinal detachment No family hx of      Macular Degeneration No family hx of        Review of Systems:  A complete review of systems reviewed by me is negative with the exeption of what has been mentioned in the history of present illness.  CONSTITUTIONAL: NEGATIVE for weight gain/loss, fever, chills, sweats or night sweats, drug allergies.  EYES: NEGATIVE for blind spots, double vision. POSITIVE for intermittent problems with focusing/fuzzy vision - being seen for this.  ENT: NEGATIVE for ear pain, sinus pain, post-nasal drip, runny nose, bloody nose. POSITIVE for sore throat  CARDIAC: NEGATIVE for fast heartbeats or fluttering in chest, chest pain or pressure, breathlessness when lying flat, swollen legs or swollen feet.  NEUROLOGIC: NEGATIVE weakness or numbness in the arms or legs. POSITIVE for tension headaches, sinus headaches, migraines  DERMATOLOGIC: NEGATIVE for rashes, new moles or change in mole(s)  PULMONARY:  NEGATIVE SOB at rest, SOB with activity, coughing up blood, wheezing or whistling when breathing.  POSITIVE for chronic dry and productive cough  GASTROINTESTINAL: NEGATIVE for nausea or vomitting, loose or watery stools, fat or grease in stools, constipation, abdominal pain, bowel movements black in color or blood noted.  GENITOURINARY: NEGATIVE for pain during urination, blood in urine. POSITIVE for urinary frequency.   MUSCULOSKELETAL: NEGATIVE for muscle pain, bone or joint pain, swollen joints.  ENDOCRINE: NEGATIVE for increased thirst or urination, diabetes.  LYMPHATIC: NEGATIVE for swollen lymph nodes, lumps or bumps in the breasts or nipple discharge.  PSYCHE: POSITIVE for depression, anxiety    Physical Examination:  Vitals: Pulse 80  Resp 18  Ht 1.524 m (5')  Wt 79.4 kg (175 lb)  SpO2 94%  BMI 34.18 kg/m2  BMI= Body mass index is 34.18 kg/(m^2).      [place] Total Score 8/7/2017   Total score - Coolidge 12       General: No apparent distress, appropriately groomed  Head: Normocephalic, atraumatic  Eyes: no icterus or conjunctival injection  Nose: Right nare patent, left nare congested. No exudate. Mild turbinate erythema and congestion. Slight left septal deviation  Mouth: op pink and moist  Orophraynx:    Mallampati Class: II.   Tonsillar Stage: 1  hidden by pillars.  Cardiac: RRR no m/r/g appreciated  Chest: Symmetric air movement, lungs clear to auscultation bilaterally  Skin: Warm, dry, intact  Psych: Mood pleasant, affect congruent  Neuro: A&Ox3, no focal neuro deficits, moving extremities equally    Impression/Plan:  #Obstructive sleep apnea, mild: Patient has known diagnosis of mild NOELLE with AHI 6.5. Last sleep study completed on 12/8/11. Patient has since been using a dental appliance with continued symptoms including snoring, gasping/choking, and daytime fatigue. Patient's clinical course is complicated by her seizure disorder (see below).   - Recommend in-lab sleep study to evaluate the  "effectiveness of dental appliance. Will obtain baseline during the  first hour, after that the study will be done with  dental appliance.  Patient has agreed to this plan.   - Patient is a life long non-smoker and has been encouraged to continue to not smoke.    #Seizure Disorder: Patient has documented seizures while sleeping. It is unclear, if patient is currently having seizures that are disrupting her sleep. Night time seizures and her seizure medication may be contributing to daytime sleepiness/fatigue. The sleep study will include seizure montage , though it is likely we may not capture epileptogenic activity during single night sleep study.  - Continue to follow-up with neurology.      Patient was strongly advised to avoid driving, operating any heavy machinery or other hazardous situations while drowsy or sleepy.  Patient was counseled on the importance of driving while alert, to pull over if drowsy, or nap before getting into the vehicle if sleepy.      She will follow up at sleep clinic in  approximately one to two weeks after her sleep study has been competed to review the results and discuss plan of care.    CC: Yg Sharp      Scribed by Margarita Gooden  Medical Student,MS4    Scribe Disclosure:   Margarita DUPREE, MS4, am serving as a scribe; to document services personally performed by David Vargas-based on data collection and the provider's statements to me.     Provider Disclosure:  LEIA,David Vargas, agree with above History, Review of Systems, Physical exam and Plan.  I have reviewed the content of the documentation and have edited it as needed. I have personally performed the services documented here and the documentation accurately represents those services and the decisions I have made.        \"I spent a total of  60 minutes face to face with Hattie Mosher during today's office visit. Over 50% of this time was spent counseling the patient and  " "coordinating care regarding sleep apnea and seizures .\"       David Vargas MD   of Medicine,  Division of Pulmonary/Sleep Medicine  [facility].              "

## 2017-08-07 NOTE — NURSING NOTE
Chief Complaint   Patient presents with     Consult     Follow up cpap       Initial Pulse 80  Resp 18  Ht 1.524 m (5')  Wt 79.4 kg (175 lb)  SpO2 94%  BMI 34.18 kg/m2 Estimated body mass index is 34.18 kg/(m^2) as calculated from the following:    Height as of this encounter: 1.524 m (5').    Weight as of this encounter: 79.4 kg (175 lb).  Medication Reconciliation: complete     TOYIN Hercules

## 2017-08-10 ENCOUNTER — OFFICE VISIT (OUTPATIENT)
Dept: FAMILY MEDICINE | Facility: CLINIC | Age: 57
End: 2017-08-10

## 2017-08-10 VITALS
RESPIRATION RATE: 16 BRPM | SYSTOLIC BLOOD PRESSURE: 130 MMHG | HEART RATE: 89 BPM | DIASTOLIC BLOOD PRESSURE: 85 MMHG | WEIGHT: 173 LBS | BODY MASS INDEX: 33.79 KG/M2

## 2017-08-10 DIAGNOSIS — E78.00 HIGH BLOOD CHOLESTEROL: ICD-10-CM

## 2017-08-10 DIAGNOSIS — K21.9 GASTROESOPHAGEAL REFLUX DISEASE WITHOUT ESOPHAGITIS: ICD-10-CM

## 2017-08-10 DIAGNOSIS — I10 BENIGN ESSENTIAL HYPERTENSION: ICD-10-CM

## 2017-08-10 DIAGNOSIS — Z12.31 VISIT FOR SCREENING MAMMOGRAM: Primary | ICD-10-CM

## 2017-08-10 DIAGNOSIS — F33.1 MAJOR DEPRESSIVE DISORDER, RECURRENT EPISODE, MODERATE (H): ICD-10-CM

## 2017-08-10 LAB
ALBUMIN SERPL-MCNC: 3.7 G/DL (ref 3.4–5)
ALP SERPL-CCNC: 91 U/L (ref 40–150)
ALT SERPL W P-5'-P-CCNC: 39 U/L (ref 0–50)
ANION GAP SERPL CALCULATED.3IONS-SCNC: 8 MMOL/L (ref 3–14)
AST SERPL W P-5'-P-CCNC: 20 U/L (ref 0–45)
BILIRUB SERPL-MCNC: 0.2 MG/DL (ref 0.2–1.3)
BUN SERPL-MCNC: 13 MG/DL (ref 7–30)
CALCIUM SERPL-MCNC: 9.2 MG/DL (ref 8.5–10.1)
CHLORIDE SERPL-SCNC: 106 MMOL/L (ref 94–109)
CHOLEST SERPL-MCNC: 210 MG/DL
CO2 SERPL-SCNC: 27 MMOL/L (ref 20–32)
CREAT SERPL-MCNC: 0.72 MG/DL (ref 0.52–1.04)
GFR SERPL CREATININE-BSD FRML MDRD: 83 ML/MIN/1.7M2
GLUCOSE SERPL-MCNC: 124 MG/DL (ref 70–99)
HDLC SERPL-MCNC: 69 MG/DL
LDLC SERPL CALC-MCNC: 113 MG/DL
NONHDLC SERPL-MCNC: 142 MG/DL
POTASSIUM SERPL-SCNC: 3.8 MMOL/L (ref 3.4–5.3)
PROT SERPL-MCNC: 7.6 G/DL (ref 6.8–8.8)
SODIUM SERPL-SCNC: 141 MMOL/L (ref 133–144)
TRIGL SERPL-MCNC: 143 MG/DL

## 2017-08-10 RX ORDER — ATORVASTATIN CALCIUM 40 MG/1
TABLET, FILM COATED ORAL
Qty: 90 TABLET | Refills: 0 | Status: CANCELLED | OUTPATIENT
Start: 2017-08-10

## 2017-08-10 RX ORDER — ESOMEPRAZOLE MAGNESIUM 40 MG/1
40 CAPSULE, DELAYED RELEASE ORAL DAILY
Qty: 90 CAPSULE | Refills: 3 | Status: SHIPPED | OUTPATIENT
Start: 2017-08-10 | End: 2018-03-21

## 2017-08-10 RX ORDER — ATORVASTATIN CALCIUM 40 MG/1
40 TABLET, FILM COATED ORAL DAILY
Qty: 90 TABLET | Refills: 3 | Status: SHIPPED | OUTPATIENT
Start: 2017-08-10 | End: 2018-09-24

## 2017-08-10 RX ORDER — SERTRALINE HYDROCHLORIDE 100 MG/1
100 TABLET, FILM COATED ORAL DAILY
Qty: 90 TABLET | Refills: 3 | Status: SHIPPED | OUTPATIENT
Start: 2017-08-10 | End: 2018-09-10

## 2017-08-10 RX ORDER — ESOMEPRAZOLE MAGNESIUM 40 MG/1
CAPSULE, DELAYED RELEASE ORAL
Qty: 90 CAPSULE | Refills: 0 | Status: CANCELLED | OUTPATIENT
Start: 2017-08-10

## 2017-08-10 RX ORDER — AMLODIPINE BESYLATE 5 MG/1
5 TABLET ORAL DAILY
Qty: 90 TABLET | Refills: 3 | Status: SHIPPED | OUTPATIENT
Start: 2017-08-10 | End: 2018-09-24

## 2017-08-10 ASSESSMENT — PAIN SCALES - GENERAL: PAINLEVEL: MODERATE PAIN (5)

## 2017-08-10 NOTE — MR AVS SNAPSHOT
After Visit Summary   8/10/2017    Hattie Mosher    MRN: [id number]           Patient Information     Date Of Birth          1960        Visit Information        Provider Department      8/10/2017 3:05 PM Yg Sharp MD [facility]        Today's Diagnoses     Visit for screening mammogram    -  1    Major depressive disorder, recurrent episode, moderate (H)        High blood cholesterol        Gastroesophageal reflux disease without esophagitis        Benign essential hypertension          Care Instructions    Primary Care Center Medication Refill Request Information:  * Please contact your pharmacy regarding ANY request for medication refills.  ** [facility] Prescription Fax = [phone]  * Please allow 3 business days for routine medication refills.  * Please allow 5 business days for controlled substance medication refills.     Primary Care Center Test Result notification information:  *You will be notified with in 7-10 days of your appointment day regarding the results of your test.  If you are on MyChart you will be notified as soon as the provider has reviewed the results and signed off on them.    Primary Care Center [phone]             Follow-ups after your visit        Follow-up notes from your care team     Return in about 1 year (around 8/10/2018).      Your next 10 appointments already scheduled     Aug 10, 2017  4:15 PM CDT   LAB with  LAB    Health Lab ([facility])    [address] [place] [id number] [phone]           Patient must bring picture ID. Patient should be prepared to give a urine specimen  Please do not eat 10-12 hours before your appointment if you are coming in fasting for labs on lipids, cholesterol, or glucose (sugar). Pregnant women should follow their Care Team instructions. Water with medications is okay. Do not drink coffee or other fluids. If you have concerns about taking   your medications, please ask at office or if scheduling via [facility], send a message by clicking on Secure Messaging, Message Your Care Team.            Aug 17, 2017 11:00 AM CDT   MELISSA Spine with Matt Lawrence PT   [facility] ( [facility])    [address] [place] [id number] [phone]              Future tests that were ordered for you today     Open Future Orders        Priority Expected Expires Ordered    Lipid panel reflex to direct LDL Routine 8/10/2017 8/24/2017 8/10/2017    Comprehensive metabolic panel Routine 8/10/2017 8/24/2017 8/10/2017            Who to contact     Please call your clinic at [phone] to:    Ask questions about your health    Make or cancel appointments    Discuss your medicines    Learn about your test results    Speak to your doctor   If you have compliments or concerns about an experience at your clinic, or if you wish to file a complaint, please contact [facility] Patient Relations at [phone] or email us at [email or web address]         Additional Information About Your Visit        [employer] Information     [facility] gives you secure access to your electronic health record. If you see a primary care provider, you can also send messages to your care team and make appointments. If you have questions, please call your primary care clinic.  If you do not have a primary care provider, please call [phone] and they will assist you.      [facility] is an electronic gateway that provides easy, online access to your medical records. With [facility], you can request a clinic appointment, read your test results, renew a prescription or communicate with your care team.     To access your existing account, please contact your [facility] or call [phone] for assistance.        Care EveryWhere ID     This is your Care EveryWhere ID. This could be  used by other organizations to access your [place] medical records  [id number]        Your Vitals Were     Pulse Respirations Breastfeeding? BMI (Body Mass Index)          89 16 No 33.79 kg/m2         Blood Pressure from Last 3 Encounters:   08/10/17 130/85   05/04/17 122/84   03/03/17 128/82    Weight from Last 3 Encounters:   08/10/17 78.5 kg (173 lb)   08/07/17 79.4 kg (175 lb)   05/30/17 75.3 kg (166 lb)                 Where to get your medicines      These medications were sent to [facility] - [place], [place] - [facility]  [address] [facility] [address]     Phone:  [phone]     amLODIPine 5 MG tablet    atorvastatin 40 MG tablet    esomeprazole 40 MG CR capsule    sertraline 100 MG tablet          Primary Care Provider Office Phone # Fax #    Yg Sharp [phone]       [address] [address]        Equal Access to Services     IGNACIO SAHNI : Hadii aad ku hadasho Soomaali, waaxda luqadaha, qaybta kaalmada adeegyada, waxay idiin haygurdeep samano . So [facility] [phone].    ATENCIÓN: Si habla español, tiene a luu disposición servicios gratuitos de asistencia lingüística. [facility] [phone].    We comply with applicable federal civil rights laws and [place] laws. We do not discriminate on the basis of race, color, national origin, age, disability sex, sexual orientation or gender identity.            Thank you!     Thank you for choosing [facility]  for your care. Our goal is always to provide you with excellent care. Hearing back from our patients is one way we can continue to improve our services. Please take a few minutes to complete the written survey that you may receive in the mail after your visit with us. Thank you!             Your Updated Medication List - Protect others around you: Learn how to safely use, store and throw away your medicines at www.disposemymeds.org.           This list is accurate as of: 8/10/17  4:11 PM.  Always use your most recent med list.                   Brand Name Dispense Instructions for use Diagnosis    ALLEGRA-D 12 HOUR  MG per 12 hr tablet   Generic drug:  fexofenadine-pseudoePHEDrine      Take 1 tablet by mouth every morning.        amLODIPine 5 MG tablet    NORVASC    90 tablet    Take 1 tablet (5 mg) by mouth daily    Benign essential hypertension       atorvastatin 40 MG tablet    LIPITOR    90 tablet    Take 1 tablet (40 mg) by mouth daily    High blood cholesterol       esomeprazole 40 MG CR capsule    nexIUM    90 capsule    Take 1 capsule (40 mg) by mouth daily    Gastroesophageal reflux disease without esophagitis       ibuprofen 800 MG tablet    ADVIL/MOTRIN    60 tablet    Take 1 tablet (800 mg) by mouth every 8 hours as needed for pain    Headache, unspecified headache type       lamoTRIgine 25 MG tablet    LaMICtal    180 tablet    Take 3 tablets (75 mg) by mouth 2 times daily    Partial symptomatic epilepsy with complex partial seizures, intractable, without status epilepticus (H), Generalized convulsive epilepsy (H)       levETIRAcetam 500 MG tablet    KEPPRA    210 tablet    Take 2.5 tabs (1250mg) by mouth twice daily    Generalized convulsive epilepsy (H), Partial symptomatic epilepsy with complex partial seizures, intractable, without status epilepticus (H)       magnesium oxide 400 MG tablet    MAG-OX    90 tablet    Take 1 tablet (400 mg) by mouth daily    Hypomagnesemia       NASONEX 50 MCG/ACT spray   Generic drug:  mometasone      2 sprays by Both Nostrils route daily.        sertraline 100 MG tablet    ZOLOFT    90 tablet    Take 1 tablet (100 mg) by mouth daily    Major depressive disorder, recurrent episode, moderate (H)       ZYRTEC 10 MG tablet   Generic drug:  cetirizine      Take 10 mg by mouth every evening.

## 2017-08-10 NOTE — PATIENT INSTRUCTIONS
[facility] Medication Refill Request Information:  * Please contact your pharmacy regarding ANY request for medication refills.  ** [facility] Prescription Fax = [phone]  * Please allow 3 business days for routine medication refills.  * Please allow 5 business days for controlled substance medication refills.     [facility] Test Result notification information:  *You will be notified with in 7-10 days of your appointment day regarding the results of your test.  If you are on MyChart you will be notified as soon as the provider has reviewed the results and signed off on them.    [facility] [phone]

## 2017-08-10 NOTE — NURSING NOTE
Chief Complaint   Patient presents with     Recheck Medication     Patient is here to discuss medication     Sandy Rebolledo CMA 3:05 PM on 8/10/2017.

## 2017-08-17 ENCOUNTER — THERAPY VISIT (OUTPATIENT)
Dept: PHYSICAL THERAPY | Facility: CLINIC | Age: 57
End: 2017-08-17
Payer: COMMERCIAL

## 2017-08-17 ENCOUNTER — TELEPHONE (OUTPATIENT)
Dept: INTERNAL MEDICINE | Facility: CLINIC | Age: 57
End: 2017-08-17

## 2017-08-17 DIAGNOSIS — R30.0 DYSURIA: ICD-10-CM

## 2017-08-17 DIAGNOSIS — G89.29 CHRONIC LEFT-SIDED LOW BACK PAIN WITH LEFT-SIDED SCIATICA: ICD-10-CM

## 2017-08-17 DIAGNOSIS — M54.42 CHRONIC LEFT-SIDED LOW BACK PAIN WITH LEFT-SIDED SCIATICA: ICD-10-CM

## 2017-08-17 DIAGNOSIS — R73.09 HIGH GLUCOSE: Primary | ICD-10-CM

## 2017-08-17 PROCEDURE — 97530 THERAPEUTIC ACTIVITIES: CPT | Mod: GP | Performed by: PHYSICAL THERAPIST

## 2017-08-17 PROCEDURE — 97110 THERAPEUTIC EXERCISES: CPT | Mod: GP | Performed by: PHYSICAL THERAPIST

## 2017-08-17 NOTE — MR AVS SNAPSHOT
After Visit Summary   8/17/2017    Hattie Mosher    MRN: [id number]           Patient Information     Date Of Birth          1960        Visit Information        Provider Department      8/17/2017 11:00 AM Matt Lawrence PT [facility]        Today's Diagnoses     Chronic left-sided low back pain with left-sided sciatica           Follow-ups after your visit        Who to contact     If you have questions or need follow up information about today's clinic visit or your schedule please contact [facility] directly at [phone].  Normal or non-critical lab and imaging results will be communicated to you by [employer], letter or phone within 4 business days after the clinic has received the results. If you do not hear from us within 7 days, please contact the clinic through [employer] or phone. If you have a critical or abnormal lab result, we will notify you by phone as soon as possible.  Submit refill requests through [employer] or call your pharmacy and they will forward the refill request to us. Please allow 3 business days for your refill to be completed.          Additional Information About Your Visit        MyChart Information     [employer] gives you secure access to your electronic health record. If you see a primary care provider, you can also send messages to your care team and make appointments. If you have questions, please call your primary care clinic.  If you do not have a primary care provider, please call [phone] and they will assist you.        Care EveryWhere ID     This is your Care EveryWhere ID. This could be used by other organizations to access your [place] medical records  [id number]         Blood Pressure from Last 3 Encounters:   08/10/17 130/85   05/04/17 122/84   03/03/17 128/82    Weight from Last 3 Encounters:   08/10/17 78.5 kg (173 lb)   08/07/17 79.4 kg (175 lb)   05/30/17 75.3 kg (166 lb)               We Performed the Following     THERAPEUTIC ACTIVITIES     THERAPEUTIC EXERCISES        Primary Care Provider Office Phone # Fax #    Yg Sharp [phone]       [address] [place] [address]        Equal Access to Services     RIVERA SAHNI : Jacky shwetha singh khaio Soomaali, waaxda luqadaha, qaybta kaalmada adeegyada, carlota geenain hayaaarnie bruceeileen butler jazmyne singleton. So [facility] [phone].    ATENCIÓN: Si habla español, tiene a luu disposición servicios gratuitos de asistencia lingüística. Llame al [phone].    We comply with applicable federal civil rights laws and [place] laws. We do not discriminate on the basis of race, color, national origin, age, disability sex, sexual orientation or gender identity.            Thank you!     [facility] [place]  for your care. Our goal is always to provide you with excellent care. Hearing back from our patients is one way we can continue to improve our services. Please take a few minutes to complete the written survey that you may receive in the mail after your visit with us. Thank you!             Your Updated Medication List - Protect others around you: Learn how to safely use, store and throw away your medicines at www.disposemymeds.org.          This list is accurate as of: 8/17/17  1:01 PM.  Always use your most recent med list.                   Brand Name Dispense Instructions for use Diagnosis    ALLEGRA-D 12 HOUR  MG per 12 hr tablet   Generic drug:  fexofenadine-pseudoePHEDrine      Take 1 tablet by mouth every morning.        amLODIPine 5 MG tablet    NORVASC    90 tablet    Take 1 tablet (5 mg) by mouth daily    Benign essential hypertension       atorvastatin 40 MG tablet    LIPITOR    90 tablet    Take 1 tablet (40 mg) by mouth daily    High blood cholesterol       esomeprazole 40 MG CR capsule    nexIUM    90 capsule    Take 1 capsule (40 mg) by mouth daily     Gastroesophageal reflux disease without esophagitis       ibuprofen 800 MG tablet    ADVIL/MOTRIN    60 tablet    Take 1 tablet (800 mg) by mouth every 8 hours as needed for pain    Headache, unspecified headache type       lamoTRIgine 25 MG tablet    LaMICtal    180 tablet    Take 3 tablets (75 mg) by mouth 2 times daily    Partial symptomatic epilepsy with complex partial seizures, intractable, without status epilepticus (H), Generalized convulsive epilepsy (H)       levETIRAcetam 500 MG tablet    KEPPRA    210 tablet    Take 2.5 tabs (1250mg) by mouth twice daily    Generalized convulsive epilepsy (H), Partial symptomatic epilepsy with complex partial seizures, intractable, without status epilepticus (H)       magnesium oxide 400 MG tablet    MAG-OX    90 tablet    Take 1 tablet (400 mg) by mouth daily    Hypomagnesemia       NASONEX 50 MCG/ACT spray   Generic drug:  mometasone      2 sprays by Both Nostrils route daily.        sertraline 100 MG tablet    ZOLOFT    90 tablet    Take 1 tablet (100 mg) by mouth daily    Major depressive disorder, recurrent episode, moderate (H)       ZYRTEC 10 MG tablet   Generic drug:  cetirizine      Take 10 mg by mouth every evening.

## 2017-08-17 NOTE — TELEPHONE ENCOUNTER
----- Message from Yg Sharp MD sent at 8/14/2017  3:22 PM CDT -----  Can you give her a call; sugar sneaking up to borderline too high--I'd like her to do hgba1c near future lab only re: prediabetes

## 2017-08-17 NOTE — PROGRESS NOTES
Therapist Impression:   Hattie returns to therapy with worsening Oswestry and Med Start outcomes forms.  She reports her symptoms are worse especially since she has been dong a lot of sitting.  At this point, due to lack of progress, we outlined a plan below for Hattie.    1. 2nd PT opinion with Jorge A Dueñas, RAMIRO Vazquez or Mazin Acevedo, PT in [facility]  [address]  [place] [address]  Clinic phone: [phone]    2. Call Dr. Rojo's office to determine follow up with him or with another specialist (injection or back specialist)    3. Continue strengthening routine (monitor symptoms)    4. Determine insurance plan for October    Objective:  Repeated flexion in standing no change  Repeated extension in standing improved symptom  Burning in leg after today's session.

## 2017-08-23 DIAGNOSIS — R73.09 HIGH GLUCOSE: ICD-10-CM

## 2017-08-23 DIAGNOSIS — R30.0 DYSURIA: ICD-10-CM

## 2017-08-23 LAB — HBA1C MFR BLD: 6 % (ref 4.3–6)

## 2017-08-28 NOTE — PROGRESS NOTES
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    Here with a f/u.  Due for labs, high chol, on statin    BP good control on well tolerated med as listed    NOELLE: August 7 note rvwd    Sciatica still present despite good attempt PT, she plans f/u Sp Med    No new c/o    Neg cardiopulm ROS    Allergies   Allergen Reactions     Nkda [No Known Drug Allergies]      Seasonal Allergies                  Current Outpatient Prescriptions   Medication Sig Dispense Refill     sertraline (ZOLOFT) 100 MG tablet Take 1 tablet (100 mg) by mouth daily 90 tablet 3     atorvastatin (LIPITOR) 40 MG tablet Take 1 tablet (40 mg) by mouth daily 90 tablet 3     esomeprazole (NEXIUM) 40 MG CR capsule Take 1 capsule (40 mg) by mouth daily 90 capsule 3     amLODIPine (NORVASC) 5 MG tablet Take 1 tablet (5 mg) by mouth daily 90 tablet 3     ibuprofen (ADVIL/MOTRIN) 800 MG tablet Take 1 tablet (800 mg) by mouth every 8 hours as needed for pain 60 tablet 1     lamoTRIgine (LAMICTAL) 25 MG tablet Take 3 tablets (75 mg) by mouth 2 times daily 180 tablet 11     levETIRAcetam (KEPPRA) 500 MG tablet Take 2.5 tabs (1250mg) by mouth twice daily 210 tablet 11     magnesium oxide (MAG-OX) 400 MG tablet Take 1 tablet (400 mg) by mouth daily 90 tablet 3     fexofenadine-pseudoephedrine (ALLEGRA-D 12 HOUR)  MG per tablet Take 1 tablet by mouth every morning.       mometasone (NASONEX) 50 MCG/ACT nasal spray 2 sprays by Both Nostrils route daily.       cetirizine (ZYRTEC) 10 MG tablet Take 10 mg by mouth  every evening.         Social History   Substance Use Topics     Smoking status: Never Smoker     Smokeless tobacco: Never Used     Alcohol use 1.2 oz/week     2 Standard drinks or equivalent per week      Comment: 1-2 drinks/week         OBJECTIVE:  /85 (BP Location: Left arm, Patient Position: Sitting, Cuff Size: Adult Large)  Pulse 89  Resp 16  Wt 78.5 kg (173 lb)  Breastfeeding? No  BMI 33.79 kg/m2  GENERAL APPEARANCE: Alert, no acute distress  RESP: lungs clear to auscultation   CV: normal rate, regular rhythm, no murmur or gallop  MS: extremities normal, no peripheral edema  NEURO: Alert, oriented, speech and mentation normal  PSYCHE: mentation appears normal, affect and mood normal    ASSESSMENT/PLAN:    Htn: as is  High chol: labs  NOELLE: per sleep clinic plan  Cont w/ neuro/seizures, well controlled  Discussed diet/exer/wt loss importance      COMFORT ROJAS MD

## 2017-10-11 DIAGNOSIS — G40.309 GENERALIZED CONVULSIVE EPILEPSY (H): ICD-10-CM

## 2017-10-11 DIAGNOSIS — G40.219 PARTIAL SYMPTOMATIC EPILEPSY WITH COMPLEX PARTIAL SEIZURES, INTRACTABLE, WITHOUT STATUS EPILEPTICUS (H): ICD-10-CM

## 2017-10-12 RX ORDER — LAMOTRIGINE 25 MG/1
TABLET ORAL
Qty: 180 TABLET | Refills: 11 | Status: SHIPPED | OUTPATIENT
Start: 2017-10-12 | End: 2018-04-20

## 2017-12-16 DIAGNOSIS — G40.309 GENERALIZED CONVULSIVE EPILEPSY (H): ICD-10-CM

## 2017-12-16 DIAGNOSIS — G40.219 PARTIAL SYMPTOMATIC EPILEPSY WITH COMPLEX PARTIAL SEIZURES, INTRACTABLE, WITHOUT STATUS EPILEPTICUS (H): ICD-10-CM

## 2017-12-26 DIAGNOSIS — R51.9 HEADACHE, UNSPECIFIED HEADACHE TYPE: ICD-10-CM

## 2017-12-26 RX ORDER — IBUPROFEN 800 MG/1
800 TABLET, FILM COATED ORAL EVERY 8 HOURS PRN
Qty: 60 TABLET | Refills: 1 | Status: SHIPPED | OUTPATIENT
Start: 2017-12-26 | End: 2018-11-01

## 2017-12-26 RX ORDER — LEVETIRACETAM 500 MG/1
TABLET ORAL
Qty: 210 TABLET | Refills: 0 | Status: SHIPPED | OUTPATIENT
Start: 2017-12-26 | End: 2018-04-20

## 2018-02-27 ENCOUNTER — TELEPHONE (OUTPATIENT)
Dept: INTERNAL MEDICINE | Facility: CLINIC | Age: 58
End: 2018-02-27

## 2018-02-27 NOTE — TELEPHONE ENCOUNTER
Prior Authorization Retail Medication Request  Medication/Dose: esomeprazole (NEXIUM) 40 MG CR   Diagnosis and ICD code:   New/Renewal/Insurance Change PA:   Previously Tried and Failed Therapies:     Insurance ID (if provided):   Insurance Phone (if provided):     Any additional info from fax request:     If you received a fax notification from an outside Pharmacy:  Pharmacy Name:  Pharmacy #:  Pharmacy Fax:

## 2018-03-01 NOTE — TELEPHONE ENCOUNTER
Prior Authorization Not Needed per Insurance    Medication: esomeprazole (NEXIUM) 40 MG CR -PA NOT NEEDED  Insurance Company: [employer] - Phone [phone] Fax [phone]  Expected CoPay:      Pharmacy Filling the Rx: [facility] - [place], [place] - [address] [place] [address]  Pharmacy Notified: No  Patient Notified: No    This is a non-formulary medication for the patient's plan, they are covered at a higher copay level and are not allowed tier exceptions.

## 2018-03-01 NOTE — TELEPHONE ENCOUNTER
Central Prior Authorization Team   Phone: [phone]      PA Initiation    Medication: esomeprazole (NEXIUM) 40 MG CR -Initiated  Insurance Company: [employer] - Phone [phone] Fax [phone]  Pharmacy Filling the Rx: "[facility] - [place], [place] - [address] [place] [address]  Filling Pharmacy Phone: [phone]  Filling Pharmacy Fax:    Start Date: 3/1/2018

## 2018-03-20 ENCOUNTER — TELEPHONE (OUTPATIENT)
Dept: INTERNAL MEDICINE | Facility: CLINIC | Age: 58
End: 2018-03-20

## 2018-03-20 DIAGNOSIS — K21.9 GASTROESOPHAGEAL REFLUX DISEASE WITHOUT ESOPHAGITIS: ICD-10-CM

## 2018-03-20 NOTE — TELEPHONE ENCOUNTER
Pt is taking nexium 40 mg QD.  Pt's insurance covers nexium, but copay is still high ($70). Pt is wondering if she could have alternative.    Soon[facility]Mi

## 2018-03-21 NOTE — TELEPHONE ENCOUNTER
Yes suggest Zantac 150 mg po bid, ok 60 tab/six refill, if doing ok on it then at next visit could discuss doing three mo at a time, or if even if ever possible to wean down or off of these sorts of meds.

## 2018-03-22 ENCOUNTER — TELEPHONE (OUTPATIENT)
Dept: NEUROLOGY | Facility: CLINIC | Age: 58
End: 2018-03-22

## 2018-03-22 DIAGNOSIS — G40.309 GENERALIZED CONVULSIVE EPILEPSY (H): ICD-10-CM

## 2018-03-22 DIAGNOSIS — G40.219 PARTIAL SYMPTOMATIC EPILEPSY WITH COMPLEX PARTIAL SEIZURES, INTRACTABLE, WITHOUT STATUS EPILEPTICUS (H): ICD-10-CM

## 2018-03-22 RX ORDER — LEVETIRACETAM 500 MG/1
TABLET ORAL
Qty: 465 TABLET | Refills: 1 | Status: SHIPPED | OUTPATIENT
Start: 2018-03-22 | End: 2018-04-20

## 2018-03-22 NOTE — TELEPHONE ENCOUNTER
Nurse received refill request for:  mg  Last re-ordered: 12/26/17  Last refill: 12/14/17  Last appointment: 3/3/17  Next appointment: Unscheduled  From last clinic notes:  We will check her concentrations today and follow once per year as per her request.    Pharmacy:  [facility] - Radha Wilkes

## 2018-03-23 RX ORDER — LEVETIRACETAM 500 MG/1
TABLET ORAL
Qty: 35 TABLET | Refills: 0 | Status: SHIPPED | OUTPATIENT
Start: 2018-03-23 | End: 2018-09-27

## 2018-03-23 NOTE — TELEPHONE ENCOUNTER
Nurse received In-Basket message as follows:  From: Nora Meadows      Sent: 3/23/2018   8:37 AM        To: [facility]ult[facility]   Subject: Dr. Freitas Seizure pt  calling asking f*     Pt  Julius calling asking if there is any way the pt could get a weeks worth of Rx. levETIRAcetam (KEPPRA) 500 MG tablet sent to their [facility] [address] [address] AT [facility] [place] [address] [place] [address]. Per pt  Julius the mail order pharmacy wont get the medication to the pt until middle of next week. Pt is symptomatic today and is in need of the medication now for she has 0 left. FYI pt  Julius was very apologetic and stated that it was their fault that the medication was not ordered on time but is asking for a one time allowance for the pt to get the medication today.     Per Julius the pt is feeling very dizzy and cloudy, took today off work and wanted Dr. Freitas to be aware.     Please contact Julius about medication status at [phone]   Order sent for Keppra 500 mg x 35 tab  Nurse returned call to  with this information.

## 2018-04-20 ENCOUNTER — APPOINTMENT (OUTPATIENT)
Dept: LAB | Facility: CLINIC | Age: 58
End: 2018-04-20
Payer: COMMERCIAL

## 2018-04-20 ENCOUNTER — OFFICE VISIT (OUTPATIENT)
Dept: NEUROLOGY | Facility: CLINIC | Age: 58
End: 2018-04-20
Payer: COMMERCIAL

## 2018-04-20 VITALS
HEART RATE: 86 BPM | SYSTOLIC BLOOD PRESSURE: 131 MMHG | BODY MASS INDEX: 33.69 KG/M2 | WEIGHT: 171.6 LBS | DIASTOLIC BLOOD PRESSURE: 75 MMHG | HEIGHT: 60 IN

## 2018-04-20 DIAGNOSIS — G40.219 PARTIAL SYMPTOMATIC EPILEPSY WITH COMPLEX PARTIAL SEIZURES, INTRACTABLE, WITHOUT STATUS EPILEPTICUS (H): ICD-10-CM

## 2018-04-20 DIAGNOSIS — G40.309 GENERALIZED CONVULSIVE EPILEPSY (H): ICD-10-CM

## 2018-04-20 RX ORDER — LEVETIRACETAM 500 MG/1
TABLET ORAL
Qty: 465 TABLET | Refills: 11 | Status: SHIPPED | OUTPATIENT
Start: 2018-04-20 | End: 2019-07-08

## 2018-04-20 RX ORDER — LAMOTRIGINE 25 MG/1
TABLET ORAL
Qty: 180 TABLET | Refills: 11 | Status: SHIPPED | OUTPATIENT
Start: 2018-04-20 | End: 2018-09-27

## 2018-04-20 ASSESSMENT — PAIN SCALES - GENERAL: PAINLEVEL: NO PAIN (0)

## 2018-04-20 NOTE — MR AVS SNAPSHOT
After Visit Summary   4/20/2018    Hattie Mosher    MRN: [id number]           Patient Information     Date Of Birth          1960        Visit Information        Provider Department      4/20/2018 3:00 PM Cesar Freitas MD [facility] Neurology        Today's Diagnoses     Generalized convulsive epilepsy (H)        Partial symptomatic epilepsy with complex partial seizures, intractable, without status epilepticus (H)           Follow-ups after your visit        Follow-up notes from your care team     Return in about 1 year (around 4/20/2019).      Who to contact     Please call your clinic at [phone] to:    Ask questions about your health    Make or cancel appointments    Discuss your medicines    Learn about your test results    Speak to your doctor            Additional Information About Your Visit        [employer] Information     [employer] gives you secure access to your electronic health record. If you see a primary care provider, you can also send messages to your care team and make appointments. If you have questions, please call your primary care clinic.  If you do not have a primary care provider, please call [phone] and they will assist you.      [employer] is an electronic gateway that provides easy, online access to your medical records. With [employer], you can request a clinic appointment, read your test results, renew a prescription or communicate with your care team.     To access your existing account, please contact your [facility] or call [phone] for assistance.        Care EveryWhere ID     This is your Care EveryWhere ID. This could be used by other organizations to access your [place] medical records  [id number]        Your Vitals Were     Pulse Height BMI (Body Mass Index)             86 1.524 m (5') 33.51 kg/m2          Blood Pressure from Last 3 Encounters:   04/20/18 131/75   08/10/17 130/85   05/04/17 122/84    Weight from Last 3  Encounters:   04/20/18 77.8 kg (171 lb 9.6 oz)   08/10/17 78.5 kg (173 lb)   08/07/17 79.4 kg (175 lb)              We Performed the Following     Keppra (Levetiracetam) Level     Lamotrigine Level          Today's Medication Changes          These changes are accurate as of 4/20/18 11:59 PM.  If you have any questions, ask your nurse or doctor.               These medicines have changed or have updated prescriptions.        Dose/Directions    * lamoTRIgine 25 MG tablet   Commonly known as:  LaMICtal   This may have changed:  Another medication with the same name was changed. Make sure you understand how and when to take each.   Used for:  Partial symptomatic epilepsy with complex partial seizures, intractable, without status epilepticus (H), Generalized convulsive epilepsy (H)   Changed by:  Cesar Freitas MD        Dose:  75 mg   Take 3 tablets (75 mg) by mouth 2 times daily   Quantity:  180 tablet   Refills:  11       * lamoTRIgine 25 MG tablet   Commonly known as:  LaMICtal   This may have changed:  See the new instructions.   Used for:  Partial symptomatic epilepsy with complex partial seizures, intractable, without status epilepticus (H), Generalized convulsive epilepsy (H)   Changed by:  Cesar Freitas MD        TAKE 3 TABLETS BY MOUTH TWICE DAILY   Quantity:  180 tablet   Refills:  11       * Notice:  This list has 2 medication(s) that are the same as other medications prescribed for you. Read the directions carefully, and ask your doctor or other care provider to review them with you.         Where to get your medicines      These medications were sent to [facility] - [place], [place] - [address] [place] [address] 106  [address] [facility] [address] [facility] [place] [address]     Phone:  [phone]     lamoTRIgine 25 MG tablet    levETIRAcetam 500 MG tablet                Primary Care Provider Office Phone # Fax #    Yg Sharp [phone]       [address] [place] [address]  88  [facility] [address]        Equal Access to Services     [facility] Center[facility]FFERY : Hadii aad ku hadbhavnaamy Adyali, wafelicityda luqadaha, qaybta kaalmasravan farrell, carlota singleton. So [facility] [phone].    ATENCIÓN: Si habla español, tiene a luu disposición servicios gratuitos de asistencia lingüística. Jermain al [phone].    We comply with applicable federal civil rights laws and [place] laws. We do not discriminate on the basis of race, color, national origin, age, disability, sex, sexual orientation, or gender identity.            Thank you!     Thank you for choosing [facility] NEUROLOGY  for your care. Our goal is always to provide you with excellent care. Hearing back from our patients is one way we can continue to improve our services. Please take a few minutes to complete the written survey that you may receive in the mail after your visit with us. Thank you!             Your Updated Medication List - Protect others around you: Learn how to safely use, store and throw away your medicines at www.disposemymeds.org.          This list is accurate as of 4/20/18 11:59 PM.  Always use your most recent med list.                   Brand Name Dispense Instructions for use Diagnosis    ALLEGRA-D 12 HOUR  MG per 12 hr tablet   Generic drug:  fexofenadine-pseudoePHEDrine      Take 1 tablet by mouth every morning.        amLODIPine 5 MG tablet    NORVASC    90 tablet    Take 1 tablet (5 mg) by mouth daily    Benign essential hypertension       atorvastatin 40 MG tablet    LIPITOR    90 tablet    Take 1 tablet (40 mg) by mouth daily    High blood cholesterol       ibuprofen 800 MG tablet    ADVIL/MOTRIN    60 tablet    Take 1 tablet (800 mg) by mouth every 8 hours as needed for pain    Headache, unspecified headache type       * lamoTRIgine 25 MG tablet    LaMICtal    180 tablet    Take 3 tablets (75 mg) by mouth 2 times daily    Partial symptomatic epilepsy with complex partial seizures, intractable,  without status epilepticus (H), Generalized convulsive epilepsy (H)       * lamoTRIgine 25 MG tablet    LaMICtal    180 tablet    TAKE 3 TABLETS BY MOUTH TWICE DAILY    Partial symptomatic epilepsy with complex partial seizures, intractable, without status epilepticus (H), Generalized convulsive epilepsy (H)       * levETIRAcetam 500 MG tablet    KEPPRA    35 tablet    Take 2-1/2 tabs twice Daily    Generalized convulsive epilepsy (H), Partial symptomatic epilepsy with complex partial seizures, intractable, without status epilepticus (H)       * levETIRAcetam 500 MG tablet    KEPPRA    465 tablet    Take 2.5 tabs (1250mg) by mouth twice daily    Generalized convulsive epilepsy (H), Partial symptomatic epilepsy with complex partial seizures, intractable, without status epilepticus (H)       magnesium oxide 400 MG tablet    MAG-OX    90 tablet    Take 1 tablet (400 mg) by mouth daily    Hypomagnesemia       NASONEX 50 MCG/ACT spray   Generic drug:  mometasone      Spray 2 sprays into both nostrils daily as needed        ranitidine 150 MG tablet    ZANTAC    60 tablet    Take 1 tablet (150 mg) by mouth 2 times daily    Gastroesophageal reflux disease without esophagitis       sertraline 100 MG tablet    ZOLOFT    90 tablet    Take 1 tablet (100 mg) by mouth daily    Major depressive disorder, recurrent episode, moderate (H)       ZYRTEC 10 MG tablet   Generic drug:  cetirizine      Take 10 mg by mouth every evening.        * Notice:  This list has 4 medication(s) that are the same as other medications prescribed for you. Read the directions carefully, and ask your doctor or other care provider to review them with you.

## 2018-04-20 NOTE — LETTER
2018       RE: Hattie Mosher  [address] [address]     Dear Colleague,    Thank you for referring your patient, Hattie Mosher, to the [facility] NEUROLOGY at [facility]. Please see a copy of my visit note below.    Service Date: 2018      Yg Sharp MD     Primary Care Center    [address]    [place]  [address]       RE: Hattie Mosher   MRN: [id number]   : 1960      Dear Julius:      Mrs. Mosher is 57.  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  She has a long history of seizures.  Problem with her is that it is difficult to quantify, as she is not aware of them and depends on eye witness report.  I see her once a year for her seizures.  She has done very well for the most part.  She has had some sciatic pain on the left side.  She is on a couple of medications for seizures, and this includes Lamictal, which she is on 75 twice a day, and also on Keppra 1250 twice a day.  Last levels drawn on her were levetiracetam 57 and the lamotrigine 5.7.  There have been no seizures reported.      PHYSICAL EXAMINATION:  She is pleasant.  Depression seems to be well controlled.  Her blood pressure is 131/75.  Pulse is 86.  She has a slightly positive sciatic stretch test on the left.  Lumbar MRI has shown degenerative disease at multiple levels.  However, there are no focal neurological deficits.  Reflexes, sensory and motor are normal.        We advised conservative management.  She is doing that.  Overall, she is doing very well.  She can follow with you, and I can see her as needed in the future.  Levels are obtained.      D: 2018   T: 2018   MT: EDUARD      Name:     HATTIE MOSHER   MRN:      [id number]        Account:      [id number]   :      1960           Service Date: 2018      Document: [id number]       Again, thank you for allowing me to  participate in the care of your patient.      Sincerely,    Cesar Freitas MD

## 2018-04-21 NOTE — PROGRESS NOTES
Service Date: 2018      Yg Sharp MD     Primary Care Center    [address] [place] [address], [address]    [place]  [address]       RE: Hattie Mosher   MRN: [id number]   : 1960      Dear Julius:      Mrs. Mosher is 57.  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  She has a long history of seizures.  Problem with her is that it is difficult to quantify, as she is not aware of them and depends on eye witness report.  I see her once a year for her seizures.  She has done very well for the most part.  She has had some sciatic pain on the left side.  She is on a couple of medications for seizures, and this includes Lamictal, which she is on 75 twice a day, and also on Keppra 1250 twice a day.  Last levels drawn on her were levetiracetam 57 and the lamotrigine 5.7.  There have been no seizures reported.      PHYSICAL EXAMINATION:  She is pleasant.  Depression seems to be well controlled.  Her blood pressure is 131/75.  Pulse is 86.  She has a slightly positive sciatic stretch test on the left.  Lumbar MRI has shown degenerative disease at multiple levels.  However, there are no focal neurological deficits.  Reflexes, sensory and motor are normal.        We advised conservative management.  She is doing that.  Overall, she is doing very well.  She can follow with you, and I can see her as needed in the future.  Levels are obtained.      Sincerely,            MD MIKE Graham MD             D: 2018   T: 2018   MT: EDUARD      Name:     HATTIE MOSHER   MRN:      [id number]        Account:      [id number]   :      1960           Service Date: 2018      Document: [id number]

## 2018-04-24 LAB
LAMOTRIGINE SERPL-MCNC: 3.7 UG/ML (ref 2.5–15)
LEVETIRACETAM SERPL-MCNC: 37 UG/ML (ref 12–46)

## 2018-08-29 RX ORDER — ESOMEPRAZOLE MAGNESIUM 40 MG/1
CAPSULE, DELAYED RELEASE ORAL
Qty: 90 CAPSULE | Refills: 1 | OUTPATIENT
Start: 2018-08-29

## 2018-09-10 DIAGNOSIS — F33.1 MAJOR DEPRESSIVE DISORDER, RECURRENT EPISODE, MODERATE (H): ICD-10-CM

## 2018-09-10 RX ORDER — ESOMEPRAZOLE MAGNESIUM 40 MG/1
40 CAPSULE, DELAYED RELEASE ORAL DAILY
Qty: 30 CAPSULE | Refills: 1 | OUTPATIENT
Start: 2018-09-10

## 2018-09-11 RX ORDER — SERTRALINE HYDROCHLORIDE 100 MG/1
100 TABLET, FILM COATED ORAL DAILY
Qty: 30 TABLET | Refills: 0 | Status: SHIPPED | OUTPATIENT
Start: 2018-09-11 | End: 2018-09-27

## 2018-09-11 NOTE — TELEPHONE ENCOUNTER
SERTRALINE HCL 100MG TABS      Last Written Prescription Date: 8/10/17  Last Fill Quantity: 90,   # refills: 3  Last Office Visit : 8/10/2017  Future Office visit:  None    Routing refill request to provider for review/approval because:  PHQ , appointment due.  Scheduling has been notified to contact the pt for appointment.

## 2018-09-11 NOTE — TELEPHONE ENCOUNTER
Called and spoke with patient, she was on her way to work right now. She will call back to schedule annual physical with PCP

## 2018-09-24 DIAGNOSIS — E78.00 HIGH BLOOD CHOLESTEROL: ICD-10-CM

## 2018-09-24 DIAGNOSIS — I10 BENIGN ESSENTIAL HYPERTENSION: ICD-10-CM

## 2018-09-26 RX ORDER — AMLODIPINE BESYLATE 5 MG/1
TABLET ORAL
Qty: 90 TABLET | Refills: 0 | Status: SHIPPED | OUTPATIENT
Start: 2018-09-26 | End: 2018-09-27

## 2018-09-26 RX ORDER — ESOMEPRAZOLE MAGNESIUM 40 MG/1
CAPSULE, DELAYED RELEASE ORAL
Qty: 90 CAPSULE | Refills: 1 | OUTPATIENT
Start: 2018-09-26

## 2018-09-26 RX ORDER — ATORVASTATIN CALCIUM 40 MG/1
TABLET, FILM COATED ORAL
Qty: 90 TABLET | Refills: 0 | Status: SHIPPED | OUTPATIENT
Start: 2018-09-26 | End: 2018-09-27

## 2018-09-26 NOTE — PROGRESS NOTES
[facility]  Primary Care Center   Yg Sharp MD  09/27/2018      Chief Complaint:   Refill Request     History of Present Illness:   Hattie Mosher is a 57 year old female with a history of NOELLE, hypertension, epilepsy, and migraine headaches who presents for a refill request.    Health Maintenance:  She would like a flu shot.   Last colonoscopy was in 2011.   She receives mammograms and pelvic exams at a woman's clinic.  Walks the dog for exercise. Notes some mild back pain, but stretches at home.     Uses generic Zantac once a day in the evening. Believes it helps with reflux/heartburn, congestion, and overnight mucus. She also uses Allegra in the morning.    Zoloft for her depression, which she reports helps.    Epilepsy: Sees neurology once a year. She has been using her medication and denies any seizures.      Hypertension:  Monitors her blood pressure outside of clinic every once in a while if she is not feeling well, but does not check it regularly.     NOELLE: She has previously used a CPAP, but reports that her face would break out. She now uses a different mouth piece. She has not visited the sleep clinic, but would like to.      Review of Systems:   Pertinent items are noted in HPI, remainder of complete ROS is negative.      Active Medications:      amLODIPine (NORVASC) 5 MG tablet, TAKE ONE TABLET BY MOUTH EVERY DAY, Disp: 90 tablet, Rfl: 0     atorvastatin (LIPITOR) 40 MG tablet, TAKE ONE TABLET BY MOUTH EVERY DAY, Disp: 90 tablet, Rfl: 0     cetirizine (ZYRTEC) 10 MG tablet, Take 10 mg by mouth every evening., Disp: , Rfl:      fexofenadine-pseudoephedrine (ALLEGRA-D 12 HOUR)  MG per tablet, Take 1 tablet by mouth every morning., Disp: , Rfl:      ibuprofen (ADVIL/MOTRIN) 800 MG tablet, Take 1 tablet (800 mg) by mouth every 8 hours as needed for pain, Disp: 60 tablet, Rfl: 1     lamoTRIgine (LAMICTAL) 25 MG tablet, TAKE 3 TABLETS BY MOUTH TWICE DAILY, Disp: 180 tablet, Rfl: 11     lamoTRIgine  (LAMICTAL) 25 MG tablet, Take 3 tablets (75 mg) by mouth 2 times daily, Disp: 180 tablet, Rfl: 11     levETIRAcetam (KEPPRA) 500 MG tablet, Take 2.5 tabs (1250mg) by mouth twice daily, Disp: 465 tablet, Rfl: 11     levETIRAcetam (KEPPRA) 500 MG tablet, Take 2-1/2 tabs twice Daily, Disp: 35 tablet, Rfl: 0     magnesium oxide (MAG-OX) 400 MG tablet, Take 1 tablet (400 mg) by mouth daily, Disp: 90 tablet, Rfl: 3     mometasone (NASONEX) 50 MCG/ACT nasal spray, Spray 2 sprays into both nostrils daily as needed , Disp: , Rfl:      ranitidine (ZANTAC) 150 MG tablet, Take 1 tablet (150 mg) by mouth 2 times daily, Disp: 60 tablet, Rfl: 3     sertraline (ZOLOFT) 100 MG tablet, Take 1 tablet (100 mg) by mouth daily, Disp: 30 tablet, Rfl: 0      Allergies:   Nkda [no known drug allergies] and Seasonal allergies      Past Medical History:  NOELLE  Hypertension  Migraine headaches  Seizures   Epilepsy  Hyponatremia  Hypertrophic and atrophic condition of skin  Lumbago     Past Surgical History:  , x3  endoscopic release carpal tunnel, bilateral  hysterectomy    Family History:   Mother: MS  Father: aneurysm  Brother: glaucoma      Social History:  The patient is , a nonsmoker, and consumes alcohol.      Physical Exam:   /81 (BP Location: Right arm, Patient Position: Sitting, Cuff Size: Adult Regular)  Pulse 74  Resp 20  Wt 77.6 kg (171 lb 1.6 oz)  SpO2 97%  BMI 33.42 kg/m2   Constitutional: Alert. In no distress.  Cardiovascular: RRR. No murmurs, clicks, gallops, or rub.  Psychiatric: Mentation appears normal. Normal affect.   LIZ-7: 1, PHQ-9: 2     Assessment and Plan:  Generalized convulsive epilepsy (H), Partial symptomatic epilepsy with complex partial seizures, intractable, without status epilepticus (H)  She follows with neurology and denies any recent seizures.     Benign essential hypertension  - amLODIPine (NORVASC) 5 MG tablet  Dispense: 90 tablet; Refill: 3  - FLU VACCINE, AGE >= 3 YR  -  Hemoglobin A1c    High blood cholesterol  - atorvastatin (LIPITOR) 40 MG tablet  Dispense: 90 tablet; Refill: 3  - Lipid panel reflex to direct LDL Fasting  - Comprehensive metabolic panel  - Hemoglobin A1c    Major depressive disorder, recurrent episode, moderate (H)  LIZ-7: 1. PHQ-9: 2.  - sertraline (ZOLOFT) 100 MG tablet  Dispense: 90 tablet; Refill: 3    Gastroesophageal reflux disease without esophagitis  - ranitidine (ZANTAC) 150 MG tablet  Dispense: 90 tablet; Refill: 3    NOELLE (obstructive sleep apnea)  - SLEEP EVALUATION & MANAGEMENT REFERRAL - ADULT -Other (Respond in commments) (p)     Shingrix  We discussed the new Shingrix vaccine. She will check with her insurance for coverage.     Follow-up: Return in 1 year.     Scribe Disclosure:   I, Carola Rashid, am serving as a scribe to document services personally performed by Yg Sharp MD at this visit, based upon the provider's statements to me. All documentation has been reviewed by the aforementioned provider prior to being entered into the official medical record.     Portions of this medical record were completed by a scribe. UPON MY REVIEW AND AUTHENTICATION BY ELECTRONIC SIGNATURE, this confirms (a) I performed the applicable clinical services, and (b) the record is accurate.   Yg Sharp MD

## 2018-09-26 NOTE — TELEPHONE ENCOUNTER
atorvastatin (LIPITOR) 40 MG tablet  TAKE ONE TABLET BY MOUTH EVERY DAY  Last Written Prescription Date:  8-10-17  Last Fill Quantity: 90,   # refills: 3  Last Office Visit : 8-10-17  Future Office visit:  9-27-18      amLODIPine (NORVASC) 5 MG tablet  TAKE ONE TABLET BY MOUTH EVERY DAY     Last Written Prescription Date:  8-10-17  Last Fill Quantity: 90,   # refills    Routing FYI to RN over due lab:  90 day jevon refill sent to pharmacy.    esomeprazole (NEXIUM) 40 MG CR capsule  TAKE ONE CAPSULE BY MOUTH EVERY DAY      Last Written Prescription Date: -- not on med list  Last Fill Quantity: ,   # refills:    Routing refill request to RN for review/approval because:  Drug not active on patient's medication list

## 2018-09-27 ENCOUNTER — OFFICE VISIT (OUTPATIENT)
Dept: FAMILY MEDICINE | Facility: CLINIC | Age: 58
End: 2018-09-27
Payer: COMMERCIAL

## 2018-09-27 VITALS
RESPIRATION RATE: 20 BRPM | DIASTOLIC BLOOD PRESSURE: 81 MMHG | OXYGEN SATURATION: 97 % | BODY MASS INDEX: 33.42 KG/M2 | HEART RATE: 74 BPM | SYSTOLIC BLOOD PRESSURE: 133 MMHG | WEIGHT: 171.1 LBS

## 2018-09-27 DIAGNOSIS — G40.309 GENERALIZED CONVULSIVE EPILEPSY (H): ICD-10-CM

## 2018-09-27 DIAGNOSIS — E78.00 HIGH BLOOD CHOLESTEROL: ICD-10-CM

## 2018-09-27 DIAGNOSIS — G47.33 OSA (OBSTRUCTIVE SLEEP APNEA): Primary | ICD-10-CM

## 2018-09-27 DIAGNOSIS — F33.1 MAJOR DEPRESSIVE DISORDER, RECURRENT EPISODE, MODERATE (H): ICD-10-CM

## 2018-09-27 DIAGNOSIS — I10 BENIGN ESSENTIAL HYPERTENSION: ICD-10-CM

## 2018-09-27 DIAGNOSIS — K21.9 GASTROESOPHAGEAL REFLUX DISEASE WITHOUT ESOPHAGITIS: ICD-10-CM

## 2018-09-27 RX ORDER — AMLODIPINE BESYLATE 5 MG/1
5 TABLET ORAL DAILY
Qty: 90 TABLET | Refills: 3 | Status: SHIPPED | OUTPATIENT
Start: 2018-09-27 | End: 2020-01-08

## 2018-09-27 RX ORDER — SERTRALINE HYDROCHLORIDE 100 MG/1
100 TABLET, FILM COATED ORAL DAILY
Qty: 30 TABLET | Refills: 0 | Status: CANCELLED | OUTPATIENT
Start: 2018-09-27

## 2018-09-27 RX ORDER — SERTRALINE HYDROCHLORIDE 100 MG/1
100 TABLET, FILM COATED ORAL DAILY
Qty: 90 TABLET | Refills: 3 | Status: SHIPPED | OUTPATIENT
Start: 2018-09-27 | End: 2019-09-22

## 2018-09-27 RX ORDER — ATORVASTATIN CALCIUM 40 MG/1
40 TABLET, FILM COATED ORAL DAILY
Qty: 90 TABLET | Refills: 3 | Status: SHIPPED | OUTPATIENT
Start: 2018-09-27 | End: 2020-01-08

## 2018-09-27 RX ORDER — LEVETIRACETAM 500 MG/1
TABLET ORAL
Qty: 465 TABLET | Refills: 11 | Status: CANCELLED | OUTPATIENT
Start: 2018-09-27

## 2018-09-27 ASSESSMENT — PAIN SCALES - GENERAL: PAINLEVEL: NO PAIN (0)

## 2018-09-27 NOTE — NURSING NOTE
Chief Complaint   Patient presents with     Refill Request   Susan Santo LPN 3:31 PM on 9/27/2018    Not sure if has been checked for HIV.Susan Santo LPN 3:32 PM on 9/27/2018  [facility] to [facility] Pap test.Susan Santo LPN 3:32 PM on 9/27/2018  Information given to patient about Health Directive.Susan Santo LPN 3:33 PM on 9/27/2018  PHQ--9 given to patient to fill out.Susan Santo LPN 3:34 PM on 9/27/2018    Rooming Note  Health Maintenance   Health Maintenance Due   Topic Date Due     DEPRESSION ACTION PLAN Q1 YR  11/13/1976     HIV SCREEN (SYSTEM ASSIGNED)  11/13/1976     PAP SCREENING Q3 YR (SYSTEM ASSIGNED)  11/13/1979     MAMMO SCREEN Q2 YR (SYSTEM ASSIGNED)  11/13/2008     ADVANCE DIRECTIVE PLANNING Q5 YRS  11/13/2013     PHQ-9 Q3 MONTHS  05/07/2014     INFLUENZA VACCINE (1) 09/01/2018    Health maintenance items discussed and ordered/forwarded to PCP  Susan Santo LPN 3:35 PM on 9/27/2018

## 2018-09-27 NOTE — PATIENT INSTRUCTIONS
Primary Care Center Medication Refill Request Information:  * Please contact your pharmacy regarding ANY request for medication refills.  ** [facility] = [phone]  * Please allow 3 business days for routine medication refills.  * Please allow 5 business days for controlled substance medication refills.     [facility] Test Result notification information:  *You will be notified with in 7-10 days of your appointment day regarding the results of your test.  If you are on MyChart you will be notified as soon as the provider has reviewed the results and signed off on them.    Primary Care Center: [phone]     Please call [phone] to schedule lab appointment.       Sleep Clinic [phone] ([address])

## 2018-09-27 NOTE — MR AVS SNAPSHOT
After Visit Summary   9/27/2018    Hattie Mosher    MRN: [id number]           Patient Information     Date Of Birth          1960        Visit Information        Provider Department      9/27/2018 2:50 PM Yg Sharp MD [facility]        Today's Diagnoses     NOELLE (obstructive sleep apnea)    -  1    Generalized convulsive epilepsy (H)        Partial symptomatic epilepsy with complex partial seizures, intractable, without status epilepticus (H)        Benign essential hypertension        High blood cholesterol        Major depressive disorder, recurrent episode, moderate (H)        Gastroesophageal reflux disease without esophagitis          Care Instructions    Primary Care Center Medication Refill Request Information:  * Please contact your pharmacy regarding ANY request for medication refills.  ** [facility] Prescription Fax = [phone]  * Please allow 3 business days for routine medication refills.  * Please allow 5 business days for controlled substance medication refills.     Primary Care Center Test Result notification information:  *You will be notified with in 7-10 days of your appointment day regarding the results of your test.  If you are on MyChart you will be notified as soon as the provider has reviewed the results and signed off on them.    Primary Care Center: [phone]     Please call [phone] to schedule lab appointment.       Sleep Clinic [phone] ([address])              Follow-ups after your visit        Additional Services     SLEEP EVALUATION & MANAGEMENT REFERRAL - ADULT -Other (Respond in commments) (ump)       Please be aware that coverage of these services is subject to the terms and limitations of your health insurance plan.  Call member services at your health plan with any benefit or coverage questions.      Please bring the following to your appointment:    >>   List of current medications   >>   This referral request    >>   Any documents/labs given to you for this referral                      Future tests that were ordered for you today     Open Future Orders        Priority Expected Expires Ordered    SLEEP EVALUATION & MANAGEMENT REFERRAL - ADULT -Other (Respond in commments) (ump) Routine  9/27/2019 9/27/2018    Lipid panel reflex to direct LDL Fasting Routine 9/27/2018 10/11/2018 9/27/2018    Comprehensive metabolic panel Routine 9/27/2018 10/11/2018 9/27/2018    Hemoglobin A1c Routine 9/27/2018 10/11/2018 9/27/2018            Who to contact     Please call your clinic at [phone] to:    Ask questions about your health    Make or cancel appointments    Discuss your medicines    Learn about your test results    Speak to your doctor            Additional Information About Your Visit        [employer] Information     [employer] gives you secure access to your electronic health record. If you see a primary care provider, you can also send messages to your care team and make appointments. If you have questions, please call your primary care clinic.  If you do not have a primary care provider, please call [phone] and they will assist you.      [employer] is an electronic gateway that provides easy, online access to your medical records. With [employer], you can request a clinic appointment, read your test results, renew a prescription or communicate with your care team.     To access your existing account, please contact your [facility] or call [phone] for assistance.        Care EveryWhere ID     This is your Care EveryWhere ID. This could be used by other organizations to access your [facility] medical records  [id number]        Your Vitals Were     Pulse Respirations Pulse Oximetry BMI (Body Mass Index)          74 20 97% 33.42 kg/m2         Blood Pressure from Last 3 Encounters:   09/27/18 133/81   04/20/18 131/75   08/10/17 130/85    Weight from Last 3 Encounters:   09/27/18 77.6 kg (171 lb  1.6 oz)   04/20/18 77.8 kg (171 lb 9.6 oz)   08/10/17 78.5 kg (173 lb)              We Performed the Following     FLU VACCINE, AGE >= 3 YR          Today's Medication Changes          These changes are accurate as of 9/27/18  4:00 PM.  If you have any questions, ask your nurse or doctor.               These medicines have changed or have updated prescriptions.        Dose/Directions    amLODIPine 5 MG tablet   Commonly known as:  NORVASC   This may have changed:  See the new instructions.   Used for:  Benign essential hypertension   Changed by:  Yg Sharp MD        Dose:  5 mg   Take 1 tablet (5 mg) by mouth daily   Quantity:  90 tablet   Refills:  3       atorvastatin 40 MG tablet   Commonly known as:  LIPITOR   This may have changed:  See the new instructions.   Used for:  High blood cholesterol   Changed by:  Yg Sharp MD        Dose:  40 mg   Take 1 tablet (40 mg) by mouth daily   Quantity:  90 tablet   Refills:  3       ranitidine 150 MG tablet   Commonly known as:  ZANTAC   This may have changed:  when to take this   Used for:  Gastroesophageal reflux disease without esophagitis   Changed by:  Yg Sharp MD        Dose:  150 mg   Take 1 tablet (150 mg) by mouth At Bedtime   Quantity:  90 tablet   Refills:  3            Where to get your medicines      These medications were sent to [facility] [place] - [address] [place] [address]  [address] [place] [address], [facility] [address]     Phone:  [phone]     amLODIPine 5 MG tablet    atorvastatin 40 MG tablet    ranitidine 150 MG tablet    sertraline 100 MG tablet                Primary Care Provider Office Phone # Fax #    Yg Sharp [phone]       [address] [address]        Equal Access to Services     RIVERA SAHNI AH: Jacky Hagen, lurcecia logan, everardo cruzalshameka farrell, carlota singleton. So [facility]  [phone].    ATENCIÓN: Si wm moore, tiene a luu disposición servicios gratuitos de asistencia lingüística. Jermain walker [phone].    We comply with applicable federal civil rights laws and [place] laws. We do not discriminate on the basis of race, color, national origin, age, disability, sex, sexual orientation, or gender identity.            Thank you!     Thank you for choosing [facility]  for your care. Our goal is always to provide you with excellent care. Hearing back from our patients is one way we can continue to improve our services. Please take a few minutes to complete the written survey that you may receive in the mail after your visit with us. Thank you!             Your Updated Medication List - Protect others around you: Learn how to safely use, store and throw away your medicines at www.disposemymeds.org.          This list is accurate as of 9/27/18  4:00 PM.  Always use your most recent med list.                   Brand Name Dispense Instructions for use Diagnosis    ALLEGRA-D 12 HOUR  MG per 12 hr tablet   Generic drug:  fexofenadine-pseudoePHEDrine      Take 1 tablet by mouth every morning.        amLODIPine 5 MG tablet    NORVASC    90 tablet    Take 1 tablet (5 mg) by mouth daily    Benign essential hypertension       atorvastatin 40 MG tablet    LIPITOR    90 tablet    Take 1 tablet (40 mg) by mouth daily    High blood cholesterol       ibuprofen 800 MG tablet    ADVIL/MOTRIN    60 tablet    Take 1 tablet (800 mg) by mouth every 8 hours as needed for pain    Headache, unspecified headache type       lamoTRIgine 25 MG tablet    LaMICtal    180 tablet    Take 3 tablets (75 mg) by mouth 2 times daily    Partial symptomatic epilepsy with complex partial seizures, intractable, without status epilepticus (H), Generalized convulsive epilepsy (H)       levETIRAcetam 500 MG tablet    KEPPRA    465 tablet    Take 2.5 tabs (1250mg) by mouth twice daily    Generalized  convulsive epilepsy (H), Partial symptomatic epilepsy with complex partial seizures, intractable, without status epilepticus (H)       magnesium oxide 400 MG tablet    MAG-OX    90 tablet    Take 1 tablet (400 mg) by mouth daily    Hypomagnesemia       NASONEX 50 MCG/ACT spray   Generic drug:  mometasone      Spray 2 sprays into both nostrils daily as needed        ranitidine 150 MG tablet    ZANTAC    90 tablet    Take 1 tablet (150 mg) by mouth At Bedtime    Gastroesophageal reflux disease without esophagitis       sertraline 100 MG tablet    ZOLOFT    90 tablet    Take 1 tablet (100 mg) by mouth daily    Major depressive disorder, recurrent episode, moderate (H)       ZYRTEC 10 MG tablet   Generic drug:  cetirizine      Take 10 mg by mouth every evening.

## 2018-09-27 NOTE — LETTER
[employer] Customer Service  [facility]  [address], [address]  [place] [address]  Fax: [phone]  Phone: [phone]      2018      Hattie Mosher  [address] [place] [id number]        Dear Hattie,    Thank you for your interest in becoming a [employer] user!    Your access code is: Activation code not generated  Current [employer] Status: Active     Please access the [employer] website at [email or web address].  Below the ID and password fields, select the  Sign Up Now  as New User.  You will be prompted to enter the access code listed above as well as additional personal information.  Please follow the directions carefully when creating your username and password.    If you allow your access code to , or if you have any questions please call a [employer] Representative at [phone] during normal clinic hours.     Sincerely,      [employer] Customer Service  [facility]

## 2018-10-17 DIAGNOSIS — I10 BENIGN ESSENTIAL HYPERTENSION: ICD-10-CM

## 2018-10-17 DIAGNOSIS — E78.00 HIGH BLOOD CHOLESTEROL: ICD-10-CM

## 2018-10-17 LAB
ALBUMIN SERPL-MCNC: 3.8 G/DL (ref 3.4–5)
ALP SERPL-CCNC: 87 U/L (ref 40–150)
ALT SERPL W P-5'-P-CCNC: 36 U/L (ref 0–50)
ANION GAP SERPL CALCULATED.3IONS-SCNC: 6 MMOL/L (ref 3–14)
AST SERPL W P-5'-P-CCNC: 19 U/L (ref 0–45)
BILIRUB SERPL-MCNC: 0.4 MG/DL (ref 0.2–1.3)
BUN SERPL-MCNC: 15 MG/DL (ref 7–30)
CALCIUM SERPL-MCNC: 9 MG/DL (ref 8.5–10.1)
CHLORIDE SERPL-SCNC: 106 MMOL/L (ref 94–109)
CHOLEST SERPL-MCNC: 190 MG/DL
CO2 SERPL-SCNC: 26 MMOL/L (ref 20–32)
CREAT SERPL-MCNC: 0.67 MG/DL (ref 0.52–1.04)
GFR SERPL CREATININE-BSD FRML MDRD: 90 ML/MIN/1.7M2
GLUCOSE SERPL-MCNC: 104 MG/DL (ref 70–99)
HBA1C MFR BLD: 6 % (ref 0–5.6)
HDLC SERPL-MCNC: 64 MG/DL
LDLC SERPL CALC-MCNC: 108 MG/DL
NONHDLC SERPL-MCNC: 127 MG/DL
POTASSIUM SERPL-SCNC: 4 MMOL/L (ref 3.4–5.3)
PROT SERPL-MCNC: 7.5 G/DL (ref 6.8–8.8)
SODIUM SERPL-SCNC: 138 MMOL/L (ref 133–144)
TRIGL SERPL-MCNC: 94 MG/DL

## 2018-10-30 ENCOUNTER — MYC MEDICAL ADVICE (OUTPATIENT)
Dept: FAMILY MEDICINE | Facility: CLINIC | Age: 58
End: 2018-10-30

## 2018-10-30 DIAGNOSIS — R51.9 HEADACHE, UNSPECIFIED HEADACHE TYPE: ICD-10-CM

## 2018-11-01 RX ORDER — IBUPROFEN 800 MG/1
800 TABLET, FILM COATED ORAL EVERY 8 HOURS PRN
Qty: 100 TABLET | Refills: 1 | Status: SHIPPED | OUTPATIENT
Start: 2018-11-01 | End: 2018-11-18

## 2018-11-01 NOTE — TELEPHONE ENCOUNTER
Motrin 800 mg  Last Written Prescription Date: 12/26/17  Last Fill Quantity: 60  # refills: 1  Last Office Visit : 9/27/18  Future Office visit:  No future appt    Routing refill request to RN for review/approval because:  Drug failed the [facility], [facility] or [facility] refill protocol

## 2018-11-18 ENCOUNTER — MYC MEDICAL ADVICE (OUTPATIENT)
Dept: FAMILY MEDICINE | Facility: CLINIC | Age: 58
End: 2018-11-18

## 2018-11-18 DIAGNOSIS — R51.9 HEADACHE, UNSPECIFIED HEADACHE TYPE: ICD-10-CM

## 2018-11-19 RX ORDER — IBUPROFEN 800 MG/1
800 TABLET, FILM COATED ORAL EVERY 8 HOURS PRN
Qty: 300 TABLET | Refills: 0 | Status: SHIPPED | OUTPATIENT
Start: 2018-11-19 | End: 2021-08-16

## 2018-11-19 NOTE — TELEPHONE ENCOUNTER
Last Clinic Visit: 9/27/2018  [facility]    CBC overdue - clinic notified

## 2018-11-20 NOTE — TELEPHONE ENCOUNTER
CBC order signed and my chart message sent to patient regarding lab need.    ABHISHEK WHALEN at 7:25 AM on 11/20/2018.

## 2018-11-25 ENCOUNTER — MYC MEDICAL ADVICE (OUTPATIENT)
Dept: FAMILY MEDICINE | Facility: CLINIC | Age: 58
End: 2018-11-25

## 2019-01-31 ENCOUNTER — OFFICE VISIT (OUTPATIENT)
Dept: OPHTHALMOLOGY | Facility: CLINIC | Age: 59
End: 2019-01-31
Attending: OPHTHALMOLOGY
Payer: COMMERCIAL

## 2019-01-31 DIAGNOSIS — H52.7 REFRACTIVE ERROR: ICD-10-CM

## 2019-01-31 DIAGNOSIS — B39.9 OCULAR HISTOPLASMOSIS SYNDROME OF BOTH EYES: Primary | ICD-10-CM

## 2019-01-31 DIAGNOSIS — H32 OCULAR HISTOPLASMOSIS SYNDROME OF BOTH EYES: Primary | ICD-10-CM

## 2019-01-31 PROCEDURE — G0463 HOSPITAL OUTPT CLINIC VISIT: HCPCS | Mod: ZF

## 2019-01-31 PROCEDURE — 92015 DETERMINE REFRACTIVE STATE: CPT | Mod: ZF

## 2019-01-31 ASSESSMENT — SLIT LAMP EXAM - LIDS
COMMENTS: NORMAL
COMMENTS: NORMAL

## 2019-01-31 ASSESSMENT — CONF VISUAL FIELD
OD_NORMAL: 1
METHOD: COUNTING FINGERS
OS_NORMAL: 1

## 2019-01-31 ASSESSMENT — TONOMETRY
IOP_METHOD: TONOPEN
OS_IOP_MMHG: 13
OD_IOP_MMHG: 12

## 2019-01-31 ASSESSMENT — VISUAL ACUITY
OS_CC+: -2
OD_CC: 20/20
METHOD: SNELLEN - LINEAR
CORRECTION_TYPE: GLASSES
OS_CC: 20/20

## 2019-01-31 ASSESSMENT — REFRACTION_WEARINGRX
OD_SPHERE: -3.50
OS_AXIS: 092
OD_AXIS: 034
OD_CYLINDER: +0.50
OS_CYLINDER: +1.75
OS_SPHERE: -4.50
SPECS_TYPE: SVL

## 2019-01-31 ASSESSMENT — REFRACTION_MANIFEST
OS_SPHERE: -4.75
OD_SPHERE: -4.00
OS_ADD: +2.50
OD_AXIS: 043
OS_CYLINDER: +1.25
OD_ADD: +2.50
OD_CYLINDER: +1.25
OS_AXIS: 092

## 2019-01-31 ASSESSMENT — EXTERNAL EXAM - RIGHT EYE: OD_EXAM: NORMAL

## 2019-01-31 ASSESSMENT — CUP TO DISC RATIO
OS_RATIO: 0.25
OD_RATIO: 0.15

## 2019-01-31 ASSESSMENT — EXTERNAL EXAM - LEFT EYE: OS_EXAM: NORMAL

## 2019-01-31 NOTE — NURSING NOTE
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 Pt got new glasses about 1 year ago.The glasses didn't have bifocals in them so pt didn't use them at all.  Floaters in both eyes RE>LE, no changes.    Jennifer Otoole [facility] January 31, 2019 2:31 PM

## 2019-01-31 NOTE — PROGRESS NOTES
Chief Complaint(s) and History of Present Illness(es)     Follow Up     Associated symptoms: Negative for flashes, redness, dryness and eye pain    Comments: 3.5 year follow up Presumed ocular histoplasmosis syndrome of   both eyes               Comments     Pt states that vision appearing more blurry up close. Pt got new glasses   about 1 year ago.The glasses didn't have bifocals in them so pt didn't use   them at all.  Floaters in both eyes RE>LE, no changes.    [facility] January 31, 2019 2:31 PM              Review of systems for the eyes was negative other than the pertinent positives/negatives listed in the HPI.      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 Refractive error       Last seen in 2015, stable dilated fundus exam today in both eyes   Since last exam, posterior vitreous detachment (PVD) in both eyes   Discussed symptoms of retinal tear/detachment and the need to be seen urgently should they occur   Refractive options reviewed  Refraction given         Patient disposition:   Return in about 2 years (around 1/31/2021) for DFE.          Attending Physician Attestation:  Complete documentation of historical and exam elements from today's encounter can be found in the full encounter summary report (not reduplicated in this progress note).  I personally obtained the chief complaint(s) and history of present illness.  I confirmed and edited as necessary the review of systems, past medical/surgical history, family history, social history, and examination findings as documented by others; and I examined the patient myself.  I personally reviewed the relevant tests, images, and reports as documented above.  I formulated and edited as necessary the assessment and plan and discussed the findings and management plan with the patient and family. . - Kristofer Tello MD

## 2019-02-05 ENCOUNTER — MYC MEDICAL ADVICE (OUTPATIENT)
Dept: FAMILY MEDICINE | Facility: CLINIC | Age: 59
End: 2019-02-05

## 2019-02-05 DIAGNOSIS — K21.9 GERD (GASTROESOPHAGEAL REFLUX DISEASE): Primary | ICD-10-CM

## 2019-02-06 RX ORDER — ESOMEPRAZOLE MAGNESIUM 40 MG/1
40 CAPSULE, DELAYED RELEASE ORAL
Qty: 90 CAPSULE | Refills: 3 | Status: SHIPPED | OUTPATIENT
Start: 2019-02-06 | End: 2020-04-14

## 2019-05-02 ENCOUNTER — OFFICE VISIT (OUTPATIENT)
Dept: OPHTHALMOLOGY | Facility: CLINIC | Age: 59
End: 2019-05-02
Attending: OPHTHALMOLOGY
Payer: COMMERCIAL

## 2019-05-02 DIAGNOSIS — H50.52 EXOPHORIA: Primary | ICD-10-CM

## 2019-05-02 DIAGNOSIS — B39.9 OCULAR HISTOPLASMOSIS SYNDROME OF BOTH EYES: ICD-10-CM

## 2019-05-02 DIAGNOSIS — H32 OCULAR HISTOPLASMOSIS SYNDROME OF BOTH EYES: ICD-10-CM

## 2019-05-02 DIAGNOSIS — H52.7 REFRACTIVE ERROR: ICD-10-CM

## 2019-05-02 PROCEDURE — G0463 HOSPITAL OUTPT CLINIC VISIT: HCPCS | Mod: ZF

## 2019-05-02 ASSESSMENT — REFRACTION_WEARINGRX
OD_SPHERE: -3.50
OS_AXIS: 092
SPECS_TYPE: SVL
OS_CYLINDER: +1.75
OS_AXIS: 071
OD_CYLINDER: +0.50
OS_SPHERE: -4.25
OS_SPHERE: -4.50
OD_SPHERE: -3.50
OD_AXIS: 034
OD_AXIS: 041
OD_CYLINDER: +1.00
OS_CYLINDER: +1.50

## 2019-05-02 ASSESSMENT — EXTERNAL EXAM - RIGHT EYE: OD_EXAM: NORMAL

## 2019-05-02 ASSESSMENT — CONF VISUAL FIELD
OD_NORMAL: 1
OS_NORMAL: 1

## 2019-05-02 ASSESSMENT — REFRACTION_MANIFEST
OS_SPHERE: -4.50
OD_ADD: +2.50
OD_AXIS: 046
OS_CYLINDER: +1.25
OD_SPHERE: -4.00
OS_AXIS: 080
OS_ADD: +2.50
OD_CYLINDER: +1.00

## 2019-05-02 ASSESSMENT — VISUAL ACUITY
OS_CC: 20/20
OS_CC: 20/25
OD_CC: 20/20
OD_CC: 20/20

## 2019-05-02 ASSESSMENT — TONOMETRY
IOP_METHOD: ICARE
OS_IOP_MMHG: 10
OD_IOP_MMHG: 10

## 2019-05-02 ASSESSMENT — CUP TO DISC RATIO
OD_RATIO: 0.15
OS_RATIO: 0.25

## 2019-05-02 ASSESSMENT — SLIT LAMP EXAM - LIDS
COMMENTS: NORMAL
COMMENTS: NORMAL

## 2019-05-02 ASSESSMENT — EXTERNAL EXAM - LEFT EYE: OS_EXAM: NORMAL

## 2019-05-02 NOTE — PROGRESS NOTES
Assessment & Plan      Hattie Mosher is a 58 year old female with the following diagnoses:   1. Exophoria    2. Refractive error    3. Ocular histoplasmosis syndrome of both eyes       Last seen in 1/2019  She is complaining of blurry vision when looking down.   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          Patient discussed with Dr. Omer Mansfield MD  PGY4, Dept of Ophthalmology  Pager [phone]    Attending Physician Attestation:  Complete documentation of historical and exam elements from today's encounter can be found in the full encounter summary report (not reduplicated in this progress note).  I personally obtained the chief complaint(s) and history of present illness.  I confirmed and edited as necessary the review of systems, past medical/surgical history, family history, social history, and examination findings as documented by others; and I examined the patient myself.  I personally reviewed the relevant tests, images, and reports as documented above.  I formulated and edited as necessary the assessment and plan and discussed the findings and management plan with the patient and family. . - Kristofer Tello MD

## 2019-05-02 NOTE — NURSING NOTE
No chief complaint on file.    Chief Complaint(s) and History of Present Illness(es)     Hattie Mosher is a 58 year old female with the following diagnoses:   1. Ocular histoplasmosis syndrome of both eyes   Unhappy with Rx.   Gets headaches from it.     Ashley FULLER 2:23 PM May 2, 2019

## 2019-07-02 DIAGNOSIS — E83.42 HYPOMAGNESEMIA: ICD-10-CM

## 2019-07-02 RX ORDER — MAGNESIUM OXIDE 400 MG/1
400 TABLET ORAL DAILY
Qty: 90 TABLET | Refills: 1 | Status: SHIPPED | OUTPATIENT
Start: 2019-07-02 | End: 2019-12-04

## 2019-07-08 DIAGNOSIS — G40.309 GENERALIZED CONVULSIVE EPILEPSY (H): ICD-10-CM

## 2019-07-08 DIAGNOSIS — G40.219 PARTIAL SYMPTOMATIC EPILEPSY WITH COMPLEX PARTIAL SEIZURES, INTRACTABLE, WITHOUT STATUS EPILEPTICUS (H): ICD-10-CM

## 2019-07-08 RX ORDER — LEVETIRACETAM 500 MG/1
TABLET ORAL
Qty: 465 TABLET | Refills: 11 | Status: SHIPPED | OUTPATIENT
Start: 2019-07-08 | End: 2020-07-06

## 2019-07-08 NOTE — TELEPHONE ENCOUNTER
Health Call Center    Phone Message    May a detailed message be left on voicemail: yes- Please call Josh with update    Reason for Call: Medication Refill Request    Has the patient contacted the pharmacy for the refill? Yes   Name of medication being requested: Levetiracetam 500mg and Lamotrigine 25mg    Provider who prescribed the medication: Dr. Freitas    Pharmacy: Please send to [facility] [address] in [place].  Patient otherwise will fill future rx through regular [facility].    Date medication is needed: ASAP- Patient has missed 3 doses          Action Taken: Message routed to:  [facility] neurology

## 2019-07-08 NOTE — TELEPHONE ENCOUNTER
Health Call Center    Phone Message    May a detailed message be left on voicemail: yes    Reason for Call: Medication Refill Request    Has the patient contacted the pharmacy for the refill? Yes   Name of medication being requested: lamoTRIgine (LAMICTAL) 25 MG tablet  Provider who prescribed the medication: Cesar Freitas  Pharmacy:  Please send to [facility]on [address] in [place].  Patient otherwise will fill future rx through [facility].      Date medication is needed: ASAP// patient wondering why this medication was not filled as it was in first refill request        Action Taken: Message routed to:  [facility]): NEURO

## 2019-07-08 NOTE — TELEPHONE ENCOUNTER
Last Written Prescription Date:4/20/18  Last Fill Quantity: 465 # refills:  11  Last office visit: 4/20/18  Future Office Visit: f/u 1 year

## 2019-07-09 ENCOUNTER — TELEPHONE (OUTPATIENT)
Dept: NEUROLOGY | Facility: CLINIC | Age: 59
End: 2019-07-09

## 2019-07-09 DIAGNOSIS — G40.309 GENERALIZED CONVULSIVE EPILEPSY (H): ICD-10-CM

## 2019-07-09 DIAGNOSIS — G40.219 PARTIAL SYMPTOMATIC EPILEPSY WITH COMPLEX PARTIAL SEIZURES, INTRACTABLE, WITHOUT STATUS EPILEPTICUS (H): ICD-10-CM

## 2019-07-09 RX ORDER — LAMOTRIGINE 25 MG/1
75 TABLET ORAL 2 TIMES DAILY
Qty: 186 TABLET | Refills: 11 | Status: SHIPPED | OUTPATIENT
Start: 2019-07-09 | End: 2020-06-22

## 2019-07-09 RX ORDER — LAMOTRIGINE 25 MG/1
75 TABLET ORAL 2 TIMES DAILY
Qty: 186 TABLET | Refills: 0 | Status: SHIPPED | OUTPATIENT
Start: 2019-07-09 | End: 2019-07-09

## 2019-07-09 NOTE — TELEPHONE ENCOUNTER
Nurse placed order for Lamotrigine 25 mg tabs a month supply sent to local pharmacy - [facility] [address] called pharmacy and confirmed receipt, year supply sent to Mail Order pharmacy.

## 2019-07-09 NOTE — TELEPHONE ENCOUNTER
[facility] Call Center    Phone Message    May a detailed message be left on voicemail: yes    Reason for Call: Other: Yg calling back wondering why no one has called them about pts Lamotrigine. Please call them back as soon as possible this is urgent.     Action Taken: Message routed to:  [facility]

## 2019-07-09 NOTE — TELEPHONE ENCOUNTER
[facility] Call Center    Phone Message    May a detailed message be left on voicemail: yes    Reason for Call: Other: Yg calling back. He states someone from patient relations told him Dr. Freitas signed Lamotrigines prescription yesterday 07/08 but ther's no records of it. Pt has been out of her seizure medication for about a week now. Please call Yg back as soon as possible to let him kow what's going on.      Action Taken: Message routed to:  [facility]

## 2019-07-23 DIAGNOSIS — E83.42 HYPOMAGNESEMIA: ICD-10-CM

## 2019-07-23 DIAGNOSIS — K21.9 GASTROESOPHAGEAL REFLUX DISEASE WITHOUT ESOPHAGITIS: ICD-10-CM

## 2019-07-24 DIAGNOSIS — F33.1 MAJOR DEPRESSIVE DISORDER, RECURRENT EPISODE, MODERATE (H): ICD-10-CM

## 2019-07-24 RX ORDER — MAGNESIUM OXIDE 400 MG/1
TABLET ORAL
Qty: 120 TABLET | Refills: 0 | OUTPATIENT
Start: 2019-07-24

## 2019-07-24 RX ORDER — SERTRALINE HYDROCHLORIDE 100 MG/1
100 TABLET, FILM COATED ORAL DAILY
Qty: 90 TABLET | Refills: 3 | OUTPATIENT
Start: 2019-07-24

## 2019-07-24 NOTE — TELEPHONE ENCOUNTER
Called pharmacy regarding Ranitidine request.  Discontinued per our records 2/6/19 because not effective.  Pharmacy staff reports she's been consistently picking it up.  They however did not need refill authorization at this time.  They shipped supply of ranitidine, magnesium oxide and sertraline to patient this week already.  Magnesium oxide comes in bottles of #120, so they will be using #90 plus one additional refill for one fill.  Will request when needed again.

## 2019-09-19 DIAGNOSIS — F33.1 MAJOR DEPRESSIVE DISORDER, RECURRENT EPISODE, MODERATE (H): ICD-10-CM

## 2019-09-23 RX ORDER — SERTRALINE HYDROCHLORIDE 100 MG/1
100 TABLET, FILM COATED ORAL DAILY
Qty: 30 TABLET | Refills: 0 | Status: SHIPPED | OUTPATIENT
Start: 2019-09-23 | End: 2019-12-06

## 2019-09-23 NOTE — TELEPHONE ENCOUNTER
RN left voicemail for patient. A 30 day supply of zoloft was approved due to patient needing an appointment with PCP.

## 2019-10-03 ENCOUNTER — HEALTH MAINTENANCE LETTER (OUTPATIENT)
Age: 59
End: 2019-10-03

## 2019-11-12 ENCOUNTER — OFFICE VISIT (OUTPATIENT)
Dept: OPTOMETRY | Facility: CLINIC | Age: 59
End: 2019-11-12
Payer: COMMERCIAL

## 2019-11-12 DIAGNOSIS — B39.9 OCULAR HISTOPLASMOSIS SYNDROME OF BOTH EYES: Primary | ICD-10-CM

## 2019-11-12 DIAGNOSIS — H52.4 PRESBYOPIA: ICD-10-CM

## 2019-11-12 DIAGNOSIS — H32 OCULAR HISTOPLASMOSIS SYNDROME OF BOTH EYES: Primary | ICD-10-CM

## 2019-11-12 ASSESSMENT — REFRACTION_WEARINGRX
OS_ADD: +2.25
OD_CYLINDER: +0.75
OS_AXIS: 100
SPECS_TYPE: PAL
OD_ADD: +2.25
OD_AXIS: 040
OS_CYLINDER: +1.50
OD_SPHERE: -4.00
OS_SPHERE: -5.00

## 2019-11-12 ASSESSMENT — CONF VISUAL FIELD
OS_NORMAL: 1
OD_NORMAL: 1

## 2019-11-12 ASSESSMENT — EXTERNAL EXAM - RIGHT EYE: OD_EXAM: NORMAL

## 2019-11-12 ASSESSMENT — VISUAL ACUITY
OD_CC: 20/20
METHOD: SNELLEN - LINEAR
CORRECTION_TYPE: GLASSES
OS_CC: 20/20

## 2019-11-12 ASSESSMENT — CUP TO DISC RATIO
OS_RATIO: 0.25
OD_RATIO: 0.15

## 2019-11-12 ASSESSMENT — REFRACTION_MANIFEST
OD_ADD: +2.50
OS_ADD: +2.50
OS_CYLINDER: +1.50
OS_SPHERE: -5.00
OS_AXIS: 092
OD_SPHERE: -4.25
OD_CYLINDER: +1.00
OD_AXIS: 052

## 2019-11-12 ASSESSMENT — TONOMETRY
IOP_METHOD: ICARE
OS_IOP_MMHG: 9
OD_IOP_MMHG: 10

## 2019-11-12 ASSESSMENT — SLIT LAMP EXAM - LIDS
COMMENTS: NORMAL
COMMENTS: NORMAL

## 2019-11-12 ASSESSMENT — EXTERNAL EXAM - LEFT EYE: OS_EXAM: NORMAL

## 2019-11-12 NOTE — PROGRESS NOTES
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 No neovascularization noted.   Plan: Monitor annually with dilated exam. RTC with any increase in floaters or new flashes.     (H52.4) Presbyopia  Plan: REFRACTION [87275]        SRx updated and released. Small bump to reading add should help with her symptoms, otherwise she could consider getting a more shallow frame/increasing pantoscopic tilt to allow easier access into PAL. RTC with prolonged adaptation difficulties to new lenses.       Complete documentation of historical and exam elements from today's encounter can  be found in the full encounter summary report (not reduplicated in this progress  note). I personally obtained the chief complaint(s) and history of present illness. I  confirmed and edited as necessary the review of systems, past medical/surgical  history, family history, social history, and examination findings as documented by  others; and I examined the patient myself. I personally reviewed the relevant tests,  images, and reports as documented above. I formulated and edited as necessary the  assessment and plan and discussed the findings and management plan with the  patient and family.    Luther Bashir, OD

## 2019-11-16 DIAGNOSIS — K21.9 GASTROESOPHAGEAL REFLUX DISEASE WITHOUT ESOPHAGITIS: ICD-10-CM

## 2019-12-04 DIAGNOSIS — E83.42 HYPOMAGNESEMIA: ICD-10-CM

## 2019-12-05 ENCOUNTER — NURSE TRIAGE (OUTPATIENT)
Dept: NURSING | Facility: CLINIC | Age: 59
End: 2019-12-05

## 2019-12-05 RX ORDER — MAGNESIUM OXIDE 400 MG/1
400 TABLET ORAL DAILY
Qty: 30 TABLET | Refills: 0 | Status: SHIPPED | OUTPATIENT
Start: 2019-12-05 | End: 2020-04-14

## 2019-12-05 NOTE — TELEPHONE ENCOUNTER
MAGNESIUM OXIDE 400MG TABS     Last Written Prescription Date:  7/2/2019  Last Fill Quantity: 90,   # refills: 1  Last Office Visit : 9/27/2018  Future Office visit:  None    Routing refill request to provider for review/approval because:  Office Visit Due, Labs Due,     Recheck Magnesium Level??  Drug not on the [facility] or [facility] refill protocol or controlled substance    Brynn Shukla RN  Central Triage Red Flags/Med Refills

## 2019-12-05 NOTE — TELEPHONE ENCOUNTER
"Hattie calling to clinic with \"left sided intermittent chest vibration\".  Denies pain.  Reports vibration is where breast is. She notices it more at rest than with activity, states \" may not notice it when I am busy\"  Denies dizziness, lightheadedness, chest pain, shortness of breath.  Denies nausea/vomiting, changes in skin coloring, denies feeling heart palpitations/fluttering.  Denies pain in neck/back/arms/jaw.  No personal or family history of heart problems.  Does have GERD, she is taking Nexium, reports takes this.  Does have a cough, which reports is normal for her, however, has been coughing more than usual due to her allergies.  Denies pain with deep breath.  Has appointment scheduled with Dr Sharp on 12/13 which she made today, when calling in with symptoms.  Plan of care:  Advised if develops chest pain, heart fluttering/palpitations, dizziness/lightheadedness, nausea/voming, pain in neck, jaw, arms,if has changes in skin coloring is to go directly to ED, with someone else taking her there.  Advised to continue with nexium.  Advised will forward call info to PCP to determine if would like to see her prior to 12/13/19.  No further questions or concerns at this time  Additional Information    Negative: Followed an injury to chest    Negative: SEVERE chest pain    Negative: Pain also present in shoulder(s) or arm(s) or jaw    Negative: Difficulty breathing    Negative: Cocaine use within last 3 days    Negative: History of prior 'blood clot' in leg or lungs (i.e., deep vein thrombosis, pulmonary embolism)    Negative: Recent illness requiring prolonged bed rest (i.e., immobilization)    Negative: Hip or leg fracture in past 2 months (e.g, or had cast on leg or ankle)    Negative: Major surgery in the past month    Negative: Recent long-distance travel with prolonged time in car, bus, plane, or train (i.e., within past 2 weeks; 6 or more hours duration)    Negative: Heart beating irregularly or very " rapidly    Negative: Chest pain lasting longer than 5 minutes    Negative: Intermittent chest pain and pain has been increasing in severity or frequency    Negative: Dizziness or lightheadedness    Negative: Coughing up blood    Negative: Patient sounds very sick or weak to the triager    Negative: Fever > 100.5 F (38.1 C)    Negative: Intermittent chest pains persist > 3 days    Negative: All other patients with chest pain    Negative: Patient wants to be seen    Negative: Intermittent mild chest pain lasting a few seconds each time    Protocols used: CHEST PAIN-A-OH

## 2019-12-06 DIAGNOSIS — F33.1 MAJOR DEPRESSIVE DISORDER, RECURRENT EPISODE, MODERATE (H): ICD-10-CM

## 2019-12-06 RX ORDER — SERTRALINE HYDROCHLORIDE 100 MG/1
100 TABLET, FILM COATED ORAL DAILY
Qty: 15 TABLET | Refills: 0 | Status: SHIPPED | OUTPATIENT
Start: 2019-12-06 | End: 2020-04-14

## 2019-12-06 NOTE — TELEPHONE ENCOUNTER
sertraline (ZOLOFT) 100 MG tablet    Last Written Prescription Date:  9/23/2019  Last Fill Quantity: 30,   # refills: 0  Last Office Visit : 9/27/2018  Future Office visit:  12/13/2019  Due for office Visit     14 Day supply sent to Pharmacy.   Pt has office visit 12/13/2019.  This will cover the Pt until they see there PCP.    Brynn Shukla RN  Central Triage Red Flags/Med Refills    Warnings Override History for sertraline (ZOLOFT) 100 MG tablet [[id number]]     Overridden by Nguyen Vergara RN on Sep 23, 2019 11:01 AM   Drug-Drug   1. NSAIDS / SELECTIVE SEROTONIN REUPTAKE INHIBITORS [Level: Major]   Other Orders: ibuprofen (ADVIL/MOTRIN) 800 MG tablet

## 2019-12-06 NOTE — TELEPHONE ENCOUNTER
Left a detailed message to pt's cell phone number to call me back that we could reschedule her to 12/11, if she wants, she needs to call me for the confirmation. Also I advised her to go to ED if having chest pain or worsening sxs while waiting for the appt.

## 2019-12-13 ENCOUNTER — OFFICE VISIT (OUTPATIENT)
Dept: FAMILY MEDICINE | Facility: CLINIC | Age: 59
End: 2019-12-13
Payer: COMMERCIAL

## 2019-12-13 VITALS
RESPIRATION RATE: 18 BRPM | BODY MASS INDEX: 32.73 KG/M2 | WEIGHT: 166.7 LBS | DIASTOLIC BLOOD PRESSURE: 77 MMHG | HEART RATE: 79 BPM | HEIGHT: 60 IN | SYSTOLIC BLOOD PRESSURE: 150 MMHG | OXYGEN SATURATION: 95 %

## 2019-12-13 DIAGNOSIS — R00.2 PALPITATIONS: Primary | ICD-10-CM

## 2019-12-13 DIAGNOSIS — I10 ESSENTIAL HYPERTENSION: ICD-10-CM

## 2019-12-13 ASSESSMENT — MIFFLIN-ST. JEOR: SCORE: 1242.65

## 2019-12-13 ASSESSMENT — PAIN SCALES - GENERAL: PAINLEVEL: NO PAIN (0)

## 2019-12-13 NOTE — PATIENT INSTRUCTIONS
Your health care Provider has recommended that you receive the new Shingle vaccine called Shingrix to prevent shingles for ages 50 and above. Many private insurance and Medicare Part D plans cover Shingrix. However, not all insurance carriers cover the entire cost of the Shingrix vaccine if the vaccine is administered at your primary care clinic. The clinic cannot determine your insurance benefits.  Please call your insurance carrier prior. The vaccine comes in two doses. Your second dose should be 2-6 months from your first dose.       Prior to receiving the vaccine, we recommend that you call your insurance carrier and ask them the following questions:            1. Is there a cost difference if I receive the vaccine at my doctor's office or a pharmacy?          2. Does my insurance cover the Shingrix Vaccine and administration of the vaccine?          3. What is my co-pay or deductible for the vaccine?        Please call to schedule a Nurse-Visit only at [phone].  Nurse Visit hours are available Monday, Wednesday, and Friday from 9:00 AM-11:00 AM and 1:00 PM-3:00 PM.       [facility] Medication Refill Request Information:  * Please contact your pharmacy regarding ANY request for medication refills.  ** [facility] Prescription Fax = [phone]  * Please allow 3 business days for routine medication refills.  * Please allow 5 business days for controlled substance medication refills.     [facility] Test Result notification information:  *You will be notified with in 7-10 days of your appointment day regarding the results of your test.  If you are on MyChart you will be notified as soon as the provider has reviewed the results and signed off on them.

## 2019-12-13 NOTE — PROGRESS NOTES
[facility]  Primary Care Center   Yg Sharp MD  12/13/2019      Chief Complaint:   Recheck Medication     History of Present Illness:   Hattie Mosher is a 59 year old female with a history of essential hypertension, complex partial seizure disorder, SIADH, and migraine headaches who presents for evaluation of palpitations and for refills.    Healthcare maintenance:  She recently saw an optometrist. She has had worsening floaters and has a diagnosis of ocular histoplasmosis (diagosed in June 2015. Dr. Tello). She is still taking Zoloft 100 mg, one tablet daily, which works well for her.    Sleep apnea:  She did not follow-up with sleep clinic because she was concerned that it would not be covered by her insurance. She states her daughter thinks she has breathing problems. Her  feels that the mouthpiece works better than CPAP did.    Chest fluttering sensation:  She had a fluttering feeling in her chest, somewhat like vibration. She noticed this about a week ago and it lasted a couple days, intermittently for periods of 10 minutes or so. She primarily noticed it at night, sitting still. She would say it feels like a buzzer. She felt dizzy, but not more so than normal. She felt somewhat short of breath. 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 She drinks daily coffee, but not excessive quantities. She reports frequent fatigue. She is only working part time and often lays down in the afternoon for 15-60 minutes after work. She feels she goes to bed at a decent time and sleeps through the night for 8 hours most nights.     Review of Systems:   Pertinent items are noted in HPI or as in patient entered ROS below, remainder of complete ROS is negative.     Active Medications:      amLODIPine (NORVASC) 5 MG tablet, Take 1 tablet (5 mg) by mouth daily, Disp: 90 tablet, Rfl: 3     atorvastatin  (LIPITOR) 40 MG tablet, Take 1 tablet (40 mg) by mouth daily, Disp: 90 tablet, Rfl: 3     cetirizine (ZYRTEC) 10 MG tablet, Take 10 mg by mouth every evening., Disp: , Rfl:      esomeprazole (NEXIUM) 40 MG DR capsule, Take 1 capsule (40 mg) by mouth every morning (before breakfast) Take 30-60 minutes before eating., Disp: 90 capsule, Rfl: 3     fexofenadine-pseudoephedrine (ALLEGRA-D 12 HOUR)  MG per tablet, Take 1 tablet by mouth every morning., Disp: , Rfl:      ibuprofen (ADVIL/MOTRIN) 800 MG tablet, Take 1 tablet (800 mg) by mouth every 8 hours as needed for pain, Disp: 300 tablet, Rfl: 0     lamoTRIgine (LAMICTAL) 25 MG tablet, Take 3 tablets (75 mg) by mouth 2 times daily, Disp: 186 tablet, Rfl: 11     levETIRAcetam (KEPPRA) 500 MG tablet, Take 2.5 tabs (1250mg) by mouth twice daily, Disp: 465 tablet, Rfl: 11     magnesium oxide (MAG-OX) 400 MG tablet, Take 1 tablet (400 mg) by mouth daily Patient needs to see primary provider for further refills., Disp: 30 tablet, Rfl: 0     mometasone (NASONEX) 50 MCG/ACT nasal spray, Spray 2 sprays into both nostrils daily as needed , Disp: , Rfl:      sertraline (ZOLOFT) 100 MG tablet, Take 1 tablet (100 mg) by mouth daily, Disp: 15 tablet, Rfl: 0      Allergies:   Nkda [no known drug allergies] and Seasonal allergies      Past Medical History:  Obstructive sleep apnea   Essential hypertension  Migraine headaches  Seizure disorder, complex partial  Perforation of tympanic membrane  Hypertrophic and atrophic condition of skin  Hyponatremia  SIADH, 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      Physical Exam:   BP (!) 150/77 (BP  Location: Right arm, Patient Position: Chair, Cuff Size: Adult Large)   Pulse 79   Resp 18   Ht 1.524 m (5')   Wt 75.6 kg (166 lb 11.2 oz)   SpO2 95%   Breastfeeding No   BMI 32.56 kg/m     Constitutional: Alert. In no distress.  Head: Normocephalic.   ENT: Mucous membranes are moist.   Cardiovascular: RRR. No murmurs, clicks, gallops, or rub.  Respiratory: Clear to auscultation bilaterally, no wheezes or crackles.  Musculoskeletal: No gross deformities noted.   Psychiatric: Mentation appears normal. Normal affect.      EKG:  Indication:  palpitations  Date:  12/13/2019  Impression: Bigeminy, in the 70's    Assessment and Plan:  Palpitations  - CBC with platelets differential  - Comprehensive metabolic panel  - TSH with free T4 reflex  - EKG Performed in Clinic w/ Provider Reading Fee  - Echocardiogram Complete  - Zio Patch Holter Adult Pediatric Greater than 48 hrs    Need for flu vaccine  - FLU VAC PRESRV FREE QUAD SPLIT VIR 3+YRS IM     I curb-sided cardiology. We will look at the echo and the Zio Patch for her new joseinjennifer. Minimal symptoms at this point.       Follow-up: No follow-ups on file.        Scribe Disclosure:  I, Claire Fry, am serving as a scribe to document services personally performed by Yg Sharp MD at this visit, based upon the provider's statements to me. All documentation has been reviewed by the aforementioned provider prior to being entered into the official medical record.     Portions of this medical record were completed by a scribe. UPON MY REVIEW AND AUTHENTICATION BY ELECTRONIC SIGNATURE, this confirms (a) I performed the applicable clinical services, and (b) the record is accurate.   Yg Sharp MD MD

## 2019-12-13 NOTE — NURSING NOTE
Chief Complaint   Patient presents with     Recheck Medication     pt. is here to get refills and pt. states that she has had episodes of heart palpatations       Krysta Sheldon, EMT

## 2019-12-15 LAB — INTERPRETATION ECG - MUSE: NORMAL

## 2019-12-24 ENCOUNTER — ANCILLARY PROCEDURE (OUTPATIENT)
Dept: CARDIOLOGY | Facility: CLINIC | Age: 59
End: 2019-12-24
Attending: FAMILY MEDICINE
Payer: COMMERCIAL

## 2019-12-24 ENCOUNTER — TRANSFERRED RECORDS (OUTPATIENT)
Dept: HEALTH INFORMATION MANAGEMENT | Facility: CLINIC | Age: 59
End: 2019-12-24

## 2019-12-24 DIAGNOSIS — R00.2 PALPITATIONS: ICD-10-CM

## 2019-12-24 LAB
ALBUMIN SERPL-MCNC: 3.7 G/DL (ref 3.4–5)
ALP SERPL-CCNC: 94 U/L (ref 40–150)
ALT SERPL W P-5'-P-CCNC: 30 U/L (ref 0–50)
ANION GAP SERPL CALCULATED.3IONS-SCNC: 4 MMOL/L (ref 3–14)
AST SERPL W P-5'-P-CCNC: 18 U/L (ref 0–45)
BASOPHILS # BLD AUTO: 0.1 10E9/L (ref 0–0.2)
BASOPHILS NFR BLD AUTO: 0.8 %
BILIRUB SERPL-MCNC: 0.5 MG/DL (ref 0.2–1.3)
BUN SERPL-MCNC: 13 MG/DL (ref 7–30)
CALCIUM SERPL-MCNC: 9.2 MG/DL (ref 8.5–10.1)
CHLORIDE SERPL-SCNC: 109 MMOL/L (ref 94–109)
CO2 SERPL-SCNC: 30 MMOL/L (ref 20–32)
CREAT SERPL-MCNC: 0.69 MG/DL (ref 0.52–1.04)
DIFFERENTIAL METHOD BLD: NORMAL
EOSINOPHIL # BLD AUTO: 0.4 10E9/L (ref 0–0.7)
EOSINOPHIL NFR BLD AUTO: 5.4 %
ERYTHROCYTE [DISTWIDTH] IN BLOOD BY AUTOMATED COUNT: 12.6 % (ref 10–15)
GFR SERPL CREATININE-BSD FRML MDRD: >90 ML/MIN/{1.73_M2}
GLUCOSE SERPL-MCNC: 99 MG/DL (ref 70–99)
HCT VFR BLD AUTO: 40.9 % (ref 35–47)
HGB BLD-MCNC: 13.1 G/DL (ref 11.7–15.7)
IMM GRANULOCYTES # BLD: 0 10E9/L (ref 0–0.4)
IMM GRANULOCYTES NFR BLD: 0.3 %
LYMPHOCYTES # BLD AUTO: 2.7 10E9/L (ref 0.8–5.3)
LYMPHOCYTES NFR BLD AUTO: 36 %
MCH RBC QN AUTO: 31 PG (ref 26.5–33)
MCHC RBC AUTO-ENTMCNC: 32 G/DL (ref 31.5–36.5)
MCV RBC AUTO: 97 FL (ref 78–100)
MONOCYTES # BLD AUTO: 0.5 10E9/L (ref 0–1.3)
MONOCYTES NFR BLD AUTO: 6.7 %
NEUTROPHILS # BLD AUTO: 3.8 10E9/L (ref 1.6–8.3)
NEUTROPHILS NFR BLD AUTO: 50.8 %
NRBC # BLD AUTO: 0 10*3/UL
NRBC BLD AUTO-RTO: 0 /100
PLATELET # BLD AUTO: 339 10E9/L (ref 150–450)
POTASSIUM SERPL-SCNC: 3.8 MMOL/L (ref 3.4–5.3)
PROT SERPL-MCNC: 7.1 G/DL (ref 6.8–8.8)
RBC # BLD AUTO: 4.23 10E12/L (ref 3.8–5.2)
SODIUM SERPL-SCNC: 143 MMOL/L (ref 133–144)
TSH SERPL DL<=0.005 MIU/L-ACNC: 1.15 MU/L (ref 0.4–4)
WBC # BLD AUTO: 7.5 10E9/L (ref 4–11)

## 2019-12-24 PROCEDURE — 0296T ZIO PATCH HOLTER ADULT PEDIATRIC GREATER THAN 48 HRS: CPT | Mod: ZF

## 2019-12-24 PROCEDURE — 0298T ZIO PATCH HOLTER ADULT PEDIATRIC GREATER THAN 48 HRS: CPT | Mod: ZP | Performed by: INTERNAL MEDICINE

## 2019-12-26 ENCOUNTER — TELEPHONE (OUTPATIENT)
Dept: FAMILY MEDICINE | Facility: CLINIC | Age: 59
End: 2019-12-26

## 2019-12-26 ENCOUNTER — TELEPHONE (OUTPATIENT)
Dept: INTERNAL MEDICINE | Facility: CLINIC | Age: 59
End: 2019-12-26

## 2019-12-26 DIAGNOSIS — I49.8 BIGEMINY: Primary | ICD-10-CM

## 2019-12-26 DIAGNOSIS — R00.2 PALPITATIONS: ICD-10-CM

## 2019-12-26 NOTE — TELEPHONE ENCOUNTER
----- Message from Lili Rodríguez RN sent at 12/26/2019  4:29 PM CST -----  Pt has an appt with cardiology on 1/21/20 at 1 pm, but she wants to be rescheduled after 2 pm. Please reschedule with cardiology and call the pt. Thank you.    Lili

## 2019-12-26 NOTE — TELEPHONE ENCOUNTER
Pt was informed the result and recommendations. She will be contacted after scheduling with cardiology.    Soon-Mi  ------------------------------------------------------------------------------------------------------------    ----- Message from Yg Sharp MD sent at 12/24/2019 12:41 PM CST -----  Please call her. Labs and echo normal. Due to bigeminy and palpitations, I'd like her to see cardiology, to see if can just observe or if need to do more, can you arrange.

## 2019-12-27 NOTE — TELEPHONE ENCOUNTER
RECORDS RECEIVED FROM: Internal   DATE RECEIVED:1.21.2020   NOTES STATUS DETAILS   OFFICE NOTE from referring provider    Internal 12.26.19 Dr. Sharp   OFFICE NOTE from other cardiologist    N/A    DISCHARGE SUMMARY from hospital    N/A    DISCHARGE REPORT from the ER   N/A    OPERATIVE REPORT    N/A    MEDICATION LIST   Internal    LABS     BMP   Internal 9.19.14   CBC   Internal 12.24.19   CMP   Internal 12.24.19   Lipids   Internal 10.17.18   TSH   Internal 8.7.17   DIAGNOSTIC PROCEDURES     EKG   Internal 12.13.19   Monitor Reports   N/A    IMAGING (DISC & REPORT)      Echo   Internal 12.24.19   Stress Tests   N/A    Cath   N/A    MRI/MRA   N/A    CT/CTA   N/A

## 2019-12-30 ENCOUNTER — DOCUMENTATION ONLY (OUTPATIENT)
Dept: CARE COORDINATION | Facility: CLINIC | Age: 59
End: 2019-12-30

## 2019-12-30 DIAGNOSIS — G40.309 GENERALIZED CONVULSIVE EPILEPSY (H): Primary | ICD-10-CM

## 2019-12-30 DIAGNOSIS — F33.1 MAJOR DEPRESSIVE DISORDER, RECURRENT EPISODE, MODERATE (H): ICD-10-CM

## 2020-01-02 NOTE — TELEPHONE ENCOUNTER
sertraline (ZOLOFT) 100 MG   Last Written Prescription Date:  12/6/19  Last Fill Quantity: 15    # refills:  0  Last Office Visit : 12/13/19   Future Office visit:NONE    Routing refill request to provider for review/approval because:  MED OVERRIDE    * 12/13/19   She is still taking Zoloft 100 mg, one tablet daily, which works well for her

## 2020-01-03 RX ORDER — SERTRALINE HYDROCHLORIDE 100 MG/1
100 TABLET, FILM COATED ORAL DAILY
Qty: 90 TABLET | Refills: 1 | Status: SHIPPED | OUTPATIENT
Start: 2020-01-03 | End: 2020-04-14

## 2020-01-05 DIAGNOSIS — E78.00 HIGH BLOOD CHOLESTEROL: ICD-10-CM

## 2020-01-05 DIAGNOSIS — I10 BENIGN ESSENTIAL HYPERTENSION: ICD-10-CM

## 2020-01-08 RX ORDER — ATORVASTATIN CALCIUM 40 MG/1
40 TABLET, FILM COATED ORAL DAILY
Qty: 90 TABLET | Refills: 0 | Status: SHIPPED | OUTPATIENT
Start: 2020-01-08 | End: 2020-04-14

## 2020-01-08 RX ORDER — AMLODIPINE BESYLATE 5 MG/1
5 TABLET ORAL DAILY
Qty: 90 TABLET | Refills: 1 | Status: SHIPPED | OUTPATIENT
Start: 2020-01-08 | End: 2020-04-14

## 2020-01-08 NOTE — TELEPHONE ENCOUNTER
AMLODIPINE BESYLATE 5MG TABS     Last Written Prescription Date:  9/27/2018  Last Fill Quantity: 90,   # refills: 3  Last Office Visit : 12/13/2019  Future Office visit:  None    Routing refill request to provider for review/approval because:  Blood pressure out of range      Continue same dose?    Change dose?    Refer to Provider  12/13/19 (!) 150/77         ATORVASTATIN CALCIUM 40MG TABS      Last Written Prescription Date:  9/27/2018  Last Fill Quantity: 90,   # refills: 3  Last Office Visit : 12/13/2019  Future Office visit:  None    Routing refill request to provider for review/approval because:  Lab Due:  LDL           Does the Provider want a LIPID Profile for this medication?        Or , Continue with the same dose of medication for the next year?    Brynn Shukla RN  Central Triage Red Flags/Med Refills

## 2020-01-17 NOTE — PROGRESS NOTES
I am delighted to see Hattie Mosher for PVC.     59F with history of essential hypertension, complex partial seizure disorder, SIADH, and migraine headaches who presented for evaluation of PVC.   Patient saw her PCP last 12/2019 due to palpitations which she described as sensation of heart beating fast. In office ECG showed sinus rhythm with PVC in bigeminy. TTE done which was normal. She had a Ziopatch placed from 12/24-1/7/2020, she reported that she was asymptomatic at that time. No herbal supplements. 2-3 cups of coffee per day. No energy drinks.  No reported chest pain, shortness of breath, syncopal episodes, no recurrence of palpitations. 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 She denied any limitations in her physical activity.      The following portions of the patient's history were reviewed and updated as appropriate: allergies, current medications, past family history, past medical history, past social history, past surgical history, and the problem list.     Past Medical History:  essential hypertension  complex partial seizure disorder  SIADH  migraine headaches      Allergies:  No Known Allergies     Medications:   Amlodipine 5mg po daily      Family History:   Aortic aneurysm - father     Psychosocial history:    Occasional alcohol drinker   No smoking, no drug use      Review of systems: Cardiovascular - no chest pain, palpitations, dizziness, shortness of breath, dyspnea, orthopnea, PND.     In addition,   Constitutional: No change in weight, sleep or appetite.  Normal energy.  No fever or chills  Eyes: Negative for vision changes or eye problems  ENT: No problems with ears, nose or throat.  No difficulty swallowing.  Resp: No coughing, wheezing or shortness of breath  GI: No nausea, vomiting,  heartburn, abdominal pain, diarrhea, constipation or change in bowel habits  : No urinary frequency or dysuria, bladder or kidney problems  Musculoskeletal: No significant muscle or joint  pains  Neurologic: No headaches, numbness, tingling, weakness, problems with balance or coordination  Psychiatric: No problems with anxiety, depression or mental health  Heme/immune/allergy: No history of bleeding or clotting problems or anemia.  No allergies or immune system problems  Integumentary: No rashes,worrisome lesions or skin problems        Physical examination  /77 (BP Location: Left arm, Patient Position: Chair, Cuff Size: Adult Regular)   Pulse 89   Ht 1.524 m (5')   Wt 77.3 kg (170 lb 6.4 oz)   SpO2 93%   BMI 33.28 kg/m       Constitutional: In general, the patient is a pleasant male in no apparent distress.    Eyes: PERRLA.  EOMI.  Sclerae white, not injected.  ENT/mouth: Normiocephalic and atraumatic.  Nares clear.  Pharynx without erythema or exudate.  Dentition intact.  No adenopathy.  No thyromegaly. Carotids +2/2 bilaterally without bruits.  No jugular venous distension.   Card/Vasc: The PMI is in the 5th ICS in the midclavicular line. There is no heave. Regular rate and rhythm. Normal S1, S2. No murmur, rub, click, or gallop. Pulses are normal bilaterally throughout. No peripheral edema.  Respiratory: Clear to asculation.  No ronchi, wheezes, rales.  No dullness to percussion.   GI: Abdomen is soft, nontender, nondistended. No organomegaly. No AAA.  No bruits.   Integument: No significant bruises or rashes  Neurological: The neurological examination reveal a patient who was oriented to person, place, and time.    Psych: Normal  Heme/Lymph/Immun: no significant adenopathy     I have previously reviewed the following labs/imaging:  EKG 12/13/2019: Sinus rhythm with PVC in bigeminy.   TTE 12/24/2019: LVEF 60-65%, no valvular abnormality   EKG 1/21/2020: Sinus rhythm with fusion beats   Ziopatch 12/24-1/7/2020: HR  bpm, average 83bpm, predominant rhythm was sinus rare SVE and VE couplets <1.0%. No patient triggered events     Assessment:  Premature Ventricular Contraction - Patient  initially presented with palpitations and found to be in PVC with bigeminy on initial EKG. Ziopatch showed <1.0% SVE and VE couplets and no patient triggered events. She is currently asymptomatic and has no limitation in her physical activity. Discussed with patient that if she becomes more symptomatic can consider starting beta blocker or ablation at that time. Will defer any intervention at this time.      Follow-up as needed.      Seen and discussed with attending.   Attending addendum to follow.      Araceli Mendez  Cardiology PGY4      Patient seen and examined with Dr. Mendez. The history and physical findings are accurate as recorded.   All labs, imaging studies, ECG and telemetry data have been reviewed personally. Specifically, Ziopatch: sinus, PVC burden 1.5%.  EKG 12/13/19: sinus with PVCs in bigeminy pattern. PVCs have LBBB/inferior axis, suggestive of RVOT origin. EKG today: sinus with PVCs and fusion beats.    The assessment and plans outlined reflect our joint decision making.  Asymptomatic PVCs, low overall burden, normal echocardiogram.  No further treatment needed.    Aure Mcmullen MD  Cardiology

## 2020-01-21 ENCOUNTER — OFFICE VISIT (OUTPATIENT)
Dept: CARDIOLOGY | Facility: CLINIC | Age: 60
End: 2020-01-21
Attending: FAMILY MEDICINE
Payer: COMMERCIAL

## 2020-01-21 ENCOUNTER — PRE VISIT (OUTPATIENT)
Dept: CARDIOLOGY | Facility: CLINIC | Age: 60
End: 2020-01-21

## 2020-01-21 VITALS
HEART RATE: 89 BPM | OXYGEN SATURATION: 93 % | HEIGHT: 60 IN | DIASTOLIC BLOOD PRESSURE: 77 MMHG | WEIGHT: 170.4 LBS | BODY MASS INDEX: 33.45 KG/M2 | SYSTOLIC BLOOD PRESSURE: 116 MMHG

## 2020-01-21 DIAGNOSIS — I49.3 PVC (PREMATURE VENTRICULAR CONTRACTION): Primary | ICD-10-CM

## 2020-01-21 DIAGNOSIS — I10 ESSENTIAL HYPERTENSION: ICD-10-CM

## 2020-01-21 LAB — INTERPRETATION ECG - MUSE: NORMAL

## 2020-01-21 PROCEDURE — 93005 ELECTROCARDIOGRAM TRACING: CPT | Mod: ZF

## 2020-01-21 PROCEDURE — G0463 HOSPITAL OUTPT CLINIC VISIT: HCPCS | Mod: 25,ZF

## 2020-01-21 PROCEDURE — 99204 OFFICE O/P NEW MOD 45 MIN: CPT | Mod: GC | Performed by: INTERNAL MEDICINE

## 2020-01-21 ASSESSMENT — MIFFLIN-ST. JEOR: SCORE: 1259.43

## 2020-01-21 ASSESSMENT — PAIN SCALES - GENERAL: PAINLEVEL: NO PAIN (0)

## 2020-01-21 NOTE — LETTER
1/21/2020      RE: Hattie Mosher  [address] [address]       Dear Colleague,    Thank you for the opportunity to participate in the care of your patient, Hattie Mosher, at the [facility]. Please see a copy of my visit note below.    I am delighted to see Hattie Mosher for PVC.     59F with history of essential hypertension, complex partial seizure disorder, SIADH, and migraine headaches  who presented for evaluation of PVC.   Patient saw her PCP last 12/2019 due to palpitations which she described as sensation of heart beating fast. In office ECG showed sinus rhythm with PVC in bigeminy. TTE done which was normal. She had a Ziopatch placed from 12/24-1/7/2020, she reported that she was asymptomatic at that time. No herbal supplements. 2-3 cups of coffee per day. No energy drinks.  No reported chest pain, shortness of breath, syncopal episodes, no recurrence of palpitations. 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 She denied any limitations in her physical activity.      The following portions of the patient's history were reviewed and updated as appropriate: allergies, current medications, past family history, past medical history, past social history, past surgical history, and the problem list.     Past Medical History:  essential hypertension  complex partial seizure disorder  SIADH  migraine headaches      Allergies:  No Known Allergies     Medications:   Amlodipine 5mg po daily      Family History:   Aortic aneurysm - father     Psychosocial history:    Occasional alcohol drinker   No smoking, no drug use      Review of systems: Cardiovascular - no chest pain, palpitations, dizziness, shortness of breath, dyspnea, orthopnea, PND.     In addition,   Constitutional: No change in weight, sleep or appetite.  Normal energy.  No fever or chills  Eyes: Negative for vision changes or eye problems  ENT: No problems with  ears, nose or throat.  No difficulty swallowing.  Resp: No coughing, wheezing or shortness of breath  GI: No nausea, vomiting,  heartburn, abdominal pain, diarrhea, constipation or change in bowel habits  : No urinary frequency or dysuria, bladder or kidney problems  Musculoskeletal: No significant muscle or joint pains  Neurologic: No headaches, numbness, tingling, weakness, problems with balance or coordination  Psychiatric: No problems with anxiety, depression or mental health  Heme/immune/allergy: No history of bleeding or clotting problems or anemia.  No allergies or immune system problems  Integumentary: No rashes,worrisome lesions or skin problems        Physical examination  /77 (BP Location: Left arm, Patient Position: Chair, Cuff Size: Adult Regular)   Pulse 89   Ht 1.524 m (5')   Wt 77.3 kg (170 lb 6.4 oz)   SpO2 93%   BMI 33.28 kg/m        Constitutional: In general, the patient is a pleasant male in no apparent distress.    Eyes: PERRLA.  EOMI.  Sclerae white, not injected.  ENT/mouth: Normiocephalic and atraumatic.  Nares clear.  Pharynx without erythema or exudate.  Dentition intact.  No adenopathy.  No thyromegaly. Carotids +2/2 bilaterally without bruits.  No jugular venous distension.   Card/Vasc: The PMI is in the 5th ICS in the midclavicular line. There is no heave. Regular rate and rhythm. Normal S1, S2. No murmur, rub, click, or gallop. Pulses are normal bilaterally throughout. No peripheral edema.  Respiratory: Clear to asculation.  No ronchi, wheezes, rales.  No dullness to percussion.   GI: Abdomen is soft, nontender, nondistended. No organomegaly. No AAA.  No bruits.   Integument: No significant bruises or rashes  Neurological: The neurological examination reveal a patient who was oriented to person, place, and time.    Psych: Normal  Heme/Lymph/Immun: no significant adenopathy     I have previously reviewed the following labs/imaging:  EKG 12/13/2019: Sinus rhythm with PVC in  bigeminy.   TTE 12/24/2019: LVEF 60-65%, no valvular abnormality   EKG 1/21/2020: Sinus rhythm with fusion beats   Ziopatch 12/24-1/7/2020: HR  bpm, average 83bpm, predominant rhythm was sinus rare SVE and VE couplets <1.0%. No patient triggered events     Assessment:  Premature Ventricular Contraction - Patient initially presented with palpitations and found to be in PVC with bigeminy on initial EKG. Ziopatch showed <1.0% SVE and VE couplets and no patient triggered events. She is currently asymptomatic and has no limitation in her physical activity. Discussed with patient that if she becomes more symptomatic can consider starting beta blocker or ablation at that time. Will defer any intervention at this time.      Follow-up as needed.      Seen and discussed with attending.   Attending addendum to follow.      Araceli Mendez  Cardiology PGY4      Patient seen and examined with Dr. Mendez. The history and physical findings are accurate as recorded.   All labs, imaging studies, ECG and telemetry data have been reviewed personally. Specifically, Ziopatch: sinus, PVC burden 1.5%.  EKG 12/13/19: sinus with PVCs in bigeminy pattern. PVCs have LBBB/inferior axis, suggestive of RVOT origin. EKG today: sinus with PVCs and fusion beats.    The assessment and plans outlined reflect our joint decision making.  Asymptomatic PVCs, low overall burden, normal echocardiogram.  No further treatment needed.    Aure Mcmullen MD  Cardiology

## 2020-01-21 NOTE — NURSING NOTE
Chief Complaint   Patient presents with     New Patient     59yoF w/ hx of HTN, seizure disorder, SIADH, migraines, presents for EP consult for chest fluttering sensations per PCP Dr. Sharp 12/13.     Vitals were taken and medications were reconciled. EKG was performed.    Micaela Armstrong  2:49 PM

## 2020-01-21 NOTE — PATIENT INSTRUCTIONS
"You were seen today in the Cardiovascular Clinic at the [facility].     Cardiology Providers you saw during your visit: Dr. Aure Mcmullen    Diagnosis:  Premature ventricular complexes (PVCs)    Results: discussed with patient    Orders:   None    Medication Changes:   None    Recommendations:   None    Follow-up:   As needed  Please follow up with primary care provider for medication refills         Please feel free to call me with any questions or concerns.       Fabiola Allen RN     Questions and [phone].   First press #1 for the [employer] and then press #3 for \"Medical Questions\" to reach us Cardiology Nurses.      On Call Cardiologist for after hours or on weekends: [phone]   option #4 and ask to speak to the on-call Cardiologist.          If you need a medication refill please contact your pharmacy.  Please allow 3 business days for your refill to be completed.    "

## 2020-04-14 DIAGNOSIS — F33.1 MAJOR DEPRESSIVE DISORDER, RECURRENT EPISODE, MODERATE (H): ICD-10-CM

## 2020-04-14 DIAGNOSIS — E83.42 HYPOMAGNESEMIA: ICD-10-CM

## 2020-04-14 DIAGNOSIS — I10 BENIGN ESSENTIAL HYPERTENSION: ICD-10-CM

## 2020-04-14 DIAGNOSIS — E78.00 HIGH BLOOD CHOLESTEROL: ICD-10-CM

## 2020-04-14 DIAGNOSIS — K21.9 GERD (GASTROESOPHAGEAL REFLUX DISEASE): ICD-10-CM

## 2020-04-14 RX ORDER — MAGNESIUM OXIDE 400 MG/1
400 TABLET ORAL DAILY
Qty: 90 TABLET | Refills: 1 | Status: SHIPPED | OUTPATIENT
Start: 2020-04-14 | End: 2020-05-06

## 2020-04-14 RX ORDER — SERTRALINE HYDROCHLORIDE 100 MG/1
100 TABLET, FILM COATED ORAL DAILY
Qty: 90 TABLET | Refills: 1 | Status: SHIPPED | OUTPATIENT
Start: 2020-04-14 | End: 2020-10-21

## 2020-04-14 RX ORDER — ATORVASTATIN CALCIUM 40 MG/1
40 TABLET, FILM COATED ORAL DAILY
Qty: 90 TABLET | Refills: 0 | Status: SHIPPED | OUTPATIENT
Start: 2020-04-14 | End: 2020-07-13

## 2020-04-14 RX ORDER — AMLODIPINE BESYLATE 5 MG/1
5 TABLET ORAL DAILY
Qty: 90 TABLET | Refills: 1 | Status: SHIPPED | OUTPATIENT
Start: 2020-04-14 | End: 2020-10-21

## 2020-04-14 RX ORDER — ESOMEPRAZOLE MAGNESIUM 40 MG/1
40 CAPSULE, DELAYED RELEASE ORAL
Qty: 90 CAPSULE | Refills: 1 | Status: SHIPPED | OUTPATIENT
Start: 2020-04-14 | End: 2020-10-21

## 2020-04-14 NOTE — TELEPHONE ENCOUNTER
Fax received from [facility] requesting refills for esomeprazole, atorvastatin, magnesium oxide, and sertraline.    Last clinic visit 12/13/19. CMP last completed 12/24/19. Lipid panel last completed 10/17/18. Patient due for lipid panel, though will provide refill at this time due to COVID-19.     Medications refilled.    Comfort Cancino RN ([facility])

## 2020-05-06 ENCOUNTER — TELEPHONE (OUTPATIENT)
Dept: FAMILY MEDICINE | Facility: CLINIC | Age: 60
End: 2020-05-06

## 2020-05-06 DIAGNOSIS — E83.42 HYPOMAGNESEMIA: ICD-10-CM

## 2020-05-06 RX ORDER — MAGNESIUM OXIDE 400 MG/1
400 TABLET ORAL DAILY
Qty: 90 TABLET | Refills: 1 | Status: SHIPPED | OUTPATIENT
Start: 2020-05-06

## 2020-05-06 NOTE — TELEPHONE ENCOUNTER
[facility] Call Center    Phone Message    May a detailed message be left on voicemail: yes     Reason for Call: Medication Refill Request    Has the patient contacted the pharmacy for the refill? Yes   Name of medication being requested: magnesium oxide (MAG-OX) 400 MG tablet  Provider who prescribed the medication: Aron  Pharmacy: [id number] - [place], [place] - [address] AT [facility] OF [address]   Date medication is needed: 5/6/2020, Patient is out          Action Taken: Message routed to:  [facility] P[facility]    Travel Screening: Not Applicable

## 2020-06-16 DIAGNOSIS — G40.309 GENERALIZED CONVULSIVE EPILEPSY (H): ICD-10-CM

## 2020-06-16 DIAGNOSIS — G40.219 PARTIAL SYMPTOMATIC EPILEPSY WITH COMPLEX PARTIAL SEIZURES, INTRACTABLE, WITHOUT STATUS EPILEPTICUS (H): ICD-10-CM

## 2020-06-19 ENCOUNTER — TELEPHONE (OUTPATIENT)
Dept: FAMILY MEDICINE | Facility: CLINIC | Age: 60
End: 2020-06-19

## 2020-06-19 NOTE — TELEPHONE ENCOUNTER
[facility] Call Center    Phone Message    May a detailed message be left on voicemail: yes     Reason for Call: Order(s): Other: Labs   Reason for requested: Lipid panel reflex to direct LDL Fasting was supposed to have in January however with COVID pt never got them done and they are now , please call pt once orders are placed   Date needed: as soon as possible  Provider name: Dr Sharp      Action Taken: Message routed to:  [facility] primary care    Travel Screening: Not Applicable

## 2020-06-19 NOTE — TELEPHONE ENCOUNTER
lamoTRIgine (LAMICTAL) 25 MG   Last Written Prescription Date:  7/9/19  Last Fill Quantity: 186,   # refills: 11  Last Office Visit : 4/20/18   Future Office visit:  NONE  Scheduling has been notified to contact the pt for appointment.    Routing refill request to provider for review/approval because:  > 18 MOS RTC

## 2020-06-22 RX ORDER — LAMOTRIGINE 25 MG/1
TABLET ORAL
Qty: 540 TABLET | Refills: 11 | Status: SHIPPED | OUTPATIENT
Start: 2020-06-22 | End: 2020-07-06

## 2020-06-22 NOTE — TELEPHONE ENCOUNTER
I called and left VM letting the patient know that I extended her lab order, she can get these done any time.   Krista Kimbrough, EMT at 8:43 AM on 6/22/2020.

## 2020-06-30 ENCOUNTER — TELEPHONE (OUTPATIENT)
Dept: FAMILY MEDICINE | Facility: CLINIC | Age: 60
End: 2020-06-30

## 2020-06-30 DIAGNOSIS — E78.00 HIGH BLOOD CHOLESTEROL: ICD-10-CM

## 2020-06-30 DIAGNOSIS — R51.9 HEADACHE, UNSPECIFIED HEADACHE TYPE: ICD-10-CM

## 2020-06-30 DIAGNOSIS — R51.9 HEADACHE, UNSPECIFIED HEADACHE TYPE: Primary | ICD-10-CM

## 2020-06-30 LAB
ALBUMIN SERPL-MCNC: 3.6 G/DL (ref 3.4–5)
ALP SERPL-CCNC: 97 U/L (ref 40–150)
ALT SERPL W P-5'-P-CCNC: 30 U/L (ref 0–50)
ANION GAP SERPL CALCULATED.3IONS-SCNC: 6 MMOL/L (ref 3–14)
AST SERPL W P-5'-P-CCNC: 15 U/L (ref 0–45)
BASOPHILS # BLD AUTO: 0.1 10E9/L (ref 0–0.2)
BASOPHILS NFR BLD AUTO: 0.8 %
BILIRUB SERPL-MCNC: 0.4 MG/DL (ref 0.2–1.3)
BUN SERPL-MCNC: 14 MG/DL (ref 7–30)
CALCIUM SERPL-MCNC: 9.1 MG/DL (ref 8.5–10.1)
CHLORIDE SERPL-SCNC: 108 MMOL/L (ref 94–109)
CHOLEST SERPL-MCNC: 194 MG/DL
CO2 SERPL-SCNC: 26 MMOL/L (ref 20–32)
CREAT SERPL-MCNC: 0.7 MG/DL (ref 0.52–1.04)
DIFFERENTIAL METHOD BLD: NORMAL
EOSINOPHIL # BLD AUTO: 0.4 10E9/L (ref 0–0.7)
EOSINOPHIL NFR BLD AUTO: 5.1 %
ERYTHROCYTE [DISTWIDTH] IN BLOOD BY AUTOMATED COUNT: 12.3 % (ref 10–15)
GFR SERPL CREATININE-BSD FRML MDRD: >90 ML/MIN/{1.73_M2}
GLUCOSE SERPL-MCNC: 119 MG/DL (ref 70–99)
HCT VFR BLD AUTO: 43.1 % (ref 35–47)
HDLC SERPL-MCNC: 60 MG/DL
HGB BLD-MCNC: 13.8 G/DL (ref 11.7–15.7)
IMM GRANULOCYTES # BLD: 0 10E9/L (ref 0–0.4)
IMM GRANULOCYTES NFR BLD: 0.2 %
LDLC SERPL CALC-MCNC: 105 MG/DL
LYMPHOCYTES # BLD AUTO: 3 10E9/L (ref 0.8–5.3)
LYMPHOCYTES NFR BLD AUTO: 36.4 %
MCH RBC QN AUTO: 30.7 PG (ref 26.5–33)
MCHC RBC AUTO-ENTMCNC: 32 G/DL (ref 31.5–36.5)
MCV RBC AUTO: 96 FL (ref 78–100)
MONOCYTES # BLD AUTO: 0.6 10E9/L (ref 0–1.3)
MONOCYTES NFR BLD AUTO: 7.2 %
NEUTROPHILS # BLD AUTO: 4.2 10E9/L (ref 1.6–8.3)
NEUTROPHILS NFR BLD AUTO: 50.3 %
NONHDLC SERPL-MCNC: 134 MG/DL
NRBC # BLD AUTO: 0 10*3/UL
NRBC BLD AUTO-RTO: 0 /100
PLATELET # BLD AUTO: 348 10E9/L (ref 150–450)
POTASSIUM SERPL-SCNC: 4.1 MMOL/L (ref 3.4–5.3)
PROT SERPL-MCNC: 7.6 G/DL (ref 6.8–8.8)
RBC # BLD AUTO: 4.5 10E12/L (ref 3.8–5.2)
SODIUM SERPL-SCNC: 140 MMOL/L (ref 133–144)
TRIGL SERPL-MCNC: 145 MG/DL
WBC # BLD AUTO: 8.4 10E9/L (ref 4–11)

## 2020-07-06 ENCOUNTER — VIRTUAL VISIT (OUTPATIENT)
Dept: NEUROLOGY | Facility: CLINIC | Age: 60
End: 2020-07-06
Payer: COMMERCIAL

## 2020-07-06 DIAGNOSIS — G40.219 PARTIAL SYMPTOMATIC EPILEPSY WITH COMPLEX PARTIAL SEIZURES, INTRACTABLE, WITHOUT STATUS EPILEPTICUS (H): ICD-10-CM

## 2020-07-06 DIAGNOSIS — G40.309 GENERALIZED CONVULSIVE EPILEPSY (H): ICD-10-CM

## 2020-07-06 RX ORDER — LEVETIRACETAM 500 MG/1
TABLET ORAL
Qty: 465 TABLET | Refills: 4 | Status: SHIPPED | OUTPATIENT
Start: 2020-07-06 | End: 2021-07-25

## 2020-07-06 RX ORDER — LAMOTRIGINE 25 MG/1
TABLET ORAL
Qty: 540 TABLET | Refills: 4 | Status: SHIPPED | OUTPATIENT
Start: 2020-07-06 | End: 2021-07-25

## 2020-07-06 NOTE — LETTER
2020      RE: Hattie Mosher  [address] [facility] [address]       consent for phone visit given to me.  Osmany  Telephone visit 25 min done.  osmany    Service Date: 2020      Yg Sharp MD   [facility]   [address] [place] [address], Clinic [address]   [place] [address]      RE: Hattie Mosher   MRN: [id number]   : 1960      Dear Dr. Sharp:      We had a telephone virtual visit with Mrs. Hattie Mosher for a total of 25 minutes.  We consented her prior to it.  She has had seizures over many years.  This was a followup visit for her seizures.  We did discuss her seizure history and she says lately that she knows that she has not had any seizures, although her seizures tend to be nocturnal.      She has been at home, not working, with her , Julius.  She has a history of PVCs for which she is followed by Cardiology, which are stable.  Her current anticonvulsant consists of lamotrigine 25 b.i.d. and levetiracetam 1250 twice daily or 2500 mg a day.  That was renewed.  Her laboratory data in the past has been done and this has been therapeutic.  With her last visit, her level of levetiracetam was quite good at 37.  She does not require high levels of lamotrigine and it was 3.7.  Since her last visit, she has had no other health issues.  She has not gotten COVID.      We have ordered blood levels, which she can have at the [place] at any time.  She should continue on the same medication.  Depression is under control.  Her blood pressure today is 116/77.      Sincerely,         Cesar Freitas MD              D: 2020   T: 2020   MT: al      Name:     HATTIE MOSHER   MRN:      [id number]        Account:      [id number]   :      1960           Service Date: 2020      Document: [id number]

## 2020-07-06 NOTE — LETTER
7/6/2020       RE: Hattie Mosher  [address] [facility] [address]     Dear Colleague,    Thank you for referring your patient, Hattie Mosher, to the [facility] NEUROLOGY at [facility]. Please see a copy of my visit note below.    consent for phone visit given to me.  Osmany  Telephone visit 25 min done.  osmany    Again, thank you for allowing me to participate in the care of your patient.      Sincerely,    Cesar Freitas MD

## 2020-07-07 NOTE — PROGRESS NOTES
Service Date: 2020      Yg Sharp MD   [facility]   [address] [place] [address], Clinic [address]   [place] [address]      RE: Hattie Mosher   MRN: [id number]   : 1960      Dear Dr. Sharp:      We had a telephone virtual visit with Mrs. Hattie Mosher for a total of 25 minutes.  We consented her prior to it.  She has had seizures over many years.  This was a followup visit for her seizures.  We did discuss her seizure history and she says lately that she knows that she has not had any seizures, although her seizures tend to be nocturnal.      She has been at home, not working, with her , Julius.  She has a history of PVCs for which she is followed by Cardiology, which are stable.  Her current anticonvulsant consists of lamotrigine 25 b.i.d. and levetiracetam 1250 twice daily or 2500 mg a day.  That was renewed.  Her laboratory data in the past has been done and this has been therapeutic.  With her last visit, her level of levetiracetam was quite good at 37.  She does not require high levels of lamotrigine and it was 3.7.  Since her last visit, she has had no other health issues.  She has not gotten COVID.      We have ordered blood levels, which she can have at the [facility] at any time.  She should continue on the same medication.  Depression is under control.  Her blood pressure today is 116/77.      Sincerely,         MD MIKE Rutherford MD             D: 2020   T: 2020   MT: al      Name:     HATTIE MOSHER   MRN:      [id number]        Account:      [id number]   :      1960           Service Date: 2020      Document: [id number]

## 2020-07-09 DIAGNOSIS — E78.00 HIGH BLOOD CHOLESTEROL: ICD-10-CM

## 2020-07-13 RX ORDER — ATORVASTATIN CALCIUM 40 MG/1
40 TABLET, FILM COATED ORAL DAILY
Qty: 90 TABLET | Refills: 3 | Status: SHIPPED | OUTPATIENT
Start: 2020-07-13 | End: 2021-02-11

## 2020-07-13 NOTE — TELEPHONE ENCOUNTER
atorvastatin (LIPITOR) 40 MG tablet     Last Written Prescription Date:  4/14/2020  Last Fill Quantity: 90,   # refills: 0  Last Office Visit : 12/13/2019  Future Office visit:  None  90 Tabs, 3 Refills sent to pharm 7/13/2020    Brynn Shukla RN  Central Triage Red Flags/Med Refills

## 2020-07-16 DIAGNOSIS — G40.219 PARTIAL SYMPTOMATIC EPILEPSY WITH COMPLEX PARTIAL SEIZURES, INTRACTABLE, WITHOUT STATUS EPILEPTICUS (H): ICD-10-CM

## 2020-07-16 DIAGNOSIS — G40.309 GENERALIZED CONVULSIVE EPILEPSY (H): ICD-10-CM

## 2020-07-17 DIAGNOSIS — G40.309 GENERALIZED CONVULSIVE EPILEPSY (H): ICD-10-CM

## 2020-07-17 DIAGNOSIS — G40.219 PARTIAL SYMPTOMATIC EPILEPSY WITH COMPLEX PARTIAL SEIZURES, INTRACTABLE, WITHOUT STATUS EPILEPTICUS (H): ICD-10-CM

## 2020-07-18 DIAGNOSIS — G40.309 GENERALIZED CONVULSIVE EPILEPSY (H): ICD-10-CM

## 2020-07-18 DIAGNOSIS — G40.219 PARTIAL SYMPTOMATIC EPILEPSY WITH COMPLEX PARTIAL SEIZURES, INTRACTABLE, WITHOUT STATUS EPILEPTICUS (H): ICD-10-CM

## 2020-07-19 DIAGNOSIS — G40.219 PARTIAL SYMPTOMATIC EPILEPSY WITH COMPLEX PARTIAL SEIZURES, INTRACTABLE, WITHOUT STATUS EPILEPTICUS (H): ICD-10-CM

## 2020-07-19 DIAGNOSIS — G40.309 GENERALIZED CONVULSIVE EPILEPSY (H): ICD-10-CM

## 2020-07-20 RX ORDER — LEVETIRACETAM 500 MG/1
TABLET ORAL
Qty: 465 TABLET | Refills: 4 | OUTPATIENT
Start: 2020-07-20

## 2020-07-21 RX ORDER — LAMOTRIGINE 25 MG/1
TABLET ORAL
Qty: 186 TABLET | OUTPATIENT
Start: 2020-07-21

## 2020-07-22 RX ORDER — LAMOTRIGINE 25 MG/1
TABLET ORAL
Qty: 186 TABLET | OUTPATIENT
Start: 2020-07-22

## 2020-10-05 ENCOUNTER — TELEPHONE (OUTPATIENT)
Dept: FAMILY MEDICINE | Facility: CLINIC | Age: 60
End: 2020-10-05

## 2020-10-05 DIAGNOSIS — N39.0 URINARY TRACT INFECTION: Primary | ICD-10-CM

## 2020-10-05 RX ORDER — SULFAMETHOXAZOLE/TRIMETHOPRIM 800-160 MG
1 TABLET ORAL 2 TIMES DAILY
Qty: 6 TABLET | Refills: 0 | Status: SHIPPED | OUTPATIENT
Start: 2020-10-05 | End: 2020-10-08

## 2020-10-05 NOTE — TELEPHONE ENCOUNTER
[facility] Call Center    Phone Message    May a detailed message be left on voicemail: yes     Reason for Call: Order(s): Other:   Reason for requested: pt suspects she has a UTI  Date needed: asap  Provider name:       Action Taken: Message routed to:  [facility]    Travel Screening: Not Applicable

## 2020-10-05 NOTE — TELEPHONE ENCOUNTER
Spoke with pt.  She has been having sxs of UTI for a week, urgency, frequency, feeling pressure when voiding, cloudy urine. No fever or pain. No other sxs. Reviewed allergy list. Pt denied any med allergy.  Bactrim DS x 3 day supply was sent to the pharmacy per protocol. I also advised to push fluids and be seen if no improvement or worsens. Pt agreed with the plan.

## 2020-10-19 DIAGNOSIS — K21.9 GERD (GASTROESOPHAGEAL REFLUX DISEASE): ICD-10-CM

## 2020-10-19 DIAGNOSIS — I10 BENIGN ESSENTIAL HYPERTENSION: ICD-10-CM

## 2020-10-19 DIAGNOSIS — F33.1 MAJOR DEPRESSIVE DISORDER, RECURRENT EPISODE, MODERATE (H): ICD-10-CM

## 2020-10-21 RX ORDER — ESOMEPRAZOLE MAGNESIUM 40 MG/1
40 CAPSULE, DELAYED RELEASE ORAL
Qty: 90 CAPSULE | Refills: 0 | Status: SHIPPED | OUTPATIENT
Start: 2020-10-21 | End: 2021-01-28

## 2020-10-21 RX ORDER — AMLODIPINE BESYLATE 5 MG/1
5 TABLET ORAL DAILY
Qty: 90 TABLET | Refills: 0 | Status: SHIPPED | OUTPATIENT
Start: 2020-10-21 | End: 2021-01-29

## 2020-10-21 RX ORDER — SERTRALINE HYDROCHLORIDE 100 MG/1
100 TABLET, FILM COATED ORAL DAILY
Qty: 90 TABLET | Refills: 0 | Status: SHIPPED | OUTPATIENT
Start: 2020-10-21 | End: 2021-01-29

## 2020-10-21 NOTE — TELEPHONE ENCOUNTER
Last Clinic Visit: 12/13/2019  [facility]  amLODIPine (NORVASC) 5 MG tablet. RF 90d:0  esomeprazole (NEXIUM) 40 MG DR capsule.  RF 90d:0  sertraline (ZOLOFT) 100 MG tablet:         overdue PHQ9, FYI to clinic. RF 90d:0    Overridden by Lili Rodríguez RN on Will 3, 2020 9:15 AM  Drug-Drug  1. IBUPROFEN / SELECTIVE SEROTONIN REUPTAKE INHIBITORS [Level: Major] [Reason: Tolerated medication/side effects in past]  Other Orders: ibuprofen (ADVIL/MOTRIN) 800 MG tablet

## 2021-01-15 ENCOUNTER — HEALTH MAINTENANCE LETTER (OUTPATIENT)
Age: 61
End: 2021-01-15

## 2021-01-26 DIAGNOSIS — E78.00 HIGH BLOOD CHOLESTEROL: ICD-10-CM

## 2021-01-26 DIAGNOSIS — I10 BENIGN ESSENTIAL HYPERTENSION: ICD-10-CM

## 2021-01-26 DIAGNOSIS — F33.1 MAJOR DEPRESSIVE DISORDER, RECURRENT EPISODE, MODERATE (H): ICD-10-CM

## 2021-01-27 ENCOUNTER — TELEPHONE (OUTPATIENT)
Dept: FAMILY MEDICINE | Facility: CLINIC | Age: 61
End: 2021-01-27

## 2021-01-27 DIAGNOSIS — K21.9 GERD (GASTROESOPHAGEAL REFLUX DISEASE): Primary | ICD-10-CM

## 2021-01-27 NOTE — TELEPHONE ENCOUNTER
[facility] Call [facility]    Phone Message    May a detailed message be left on voicemail: yes     Reason for Call: Medication Question or concern regarding medication   Prescription Clarification  Name of Medication: esomeprazole (NEXIUM) 40 MG DR capsule  Prescribing Provider: DR. Sharp   Pharmacy: [facility] - [place], [place] - [email or web address]   What on the order needs clarification? Pt called and stated this medication is no longer covered, needing to discuss other options          Action Taken: Message routed to:  [facility]

## 2021-01-28 RX ORDER — PANTOPRAZOLE SODIUM 40 MG/1
40 TABLET, DELAYED RELEASE ORAL DAILY
Qty: 90 TABLET | Refills: 3 | Status: SHIPPED | OUTPATIENT
Start: 2021-01-28 | End: 2021-03-29 | Stop reason: ALTCHOICE

## 2021-01-28 NOTE — TELEPHONE ENCOUNTER
Ok let's try pantoprazole  40 mg  One po every day  Ok 90, three refills  Re: gerd    Keep appt w/ me in Feb

## 2021-01-28 NOTE — TELEPHONE ENCOUNTER
I called the pharmacy. They do not have formulary for [facility] and so do not know what the formulary alternatives are.     I called the patient to ask if her insurance sent her a letter with this information but she said she did not receive anything. I also scheduled her for phone visit in feb. As she is overdue for a check in with the doctor.     I let her know that we would have to send med and see if it is covered by insurance.   No other questions.   Krista Kimbrough, EMT at 2:10 PM on 1/28/2021.

## 2021-01-29 RX ORDER — AMLODIPINE BESYLATE 5 MG/1
5 TABLET ORAL DAILY
Qty: 30 TABLET | Refills: 0 | Status: SHIPPED | OUTPATIENT
Start: 2021-01-29 | End: 2021-02-11

## 2021-01-29 RX ORDER — ATORVASTATIN CALCIUM 40 MG/1
40 TABLET, FILM COATED ORAL DAILY
Qty: 90 TABLET | Refills: 3 | OUTPATIENT
Start: 2021-01-29

## 2021-01-29 RX ORDER — SERTRALINE HYDROCHLORIDE 100 MG/1
100 TABLET, FILM COATED ORAL DAILY
Qty: 30 TABLET | Refills: 0 | Status: SHIPPED | OUTPATIENT
Start: 2021-01-29 | End: 2021-02-11

## 2021-01-29 NOTE — TELEPHONE ENCOUNTER
Last Clinic Visit: [facility] 12/13/19  30 day jevon refill sent to the pharmacy - including instructions for patient to keep up coming appy 2/11/21

## 2021-02-11 ENCOUNTER — VIRTUAL VISIT (OUTPATIENT)
Dept: FAMILY MEDICINE | Facility: CLINIC | Age: 61
End: 2021-02-11
Payer: COMMERCIAL

## 2021-02-11 DIAGNOSIS — I10 BENIGN ESSENTIAL HYPERTENSION: ICD-10-CM

## 2021-02-11 DIAGNOSIS — E78.00 HIGH BLOOD CHOLESTEROL: ICD-10-CM

## 2021-02-11 DIAGNOSIS — F33.1 MAJOR DEPRESSIVE DISORDER, RECURRENT EPISODE, MODERATE (H): ICD-10-CM

## 2021-02-11 PROCEDURE — 99213 OFFICE O/P EST LOW 20 MIN: CPT | Mod: 95 | Performed by: FAMILY MEDICINE

## 2021-02-11 RX ORDER — AMLODIPINE BESYLATE 5 MG/1
5 TABLET ORAL DAILY
Qty: 90 TABLET | Refills: 3 | Status: SHIPPED | OUTPATIENT
Start: 2021-02-11 | End: 2021-12-30

## 2021-02-11 RX ORDER — ATORVASTATIN CALCIUM 40 MG/1
40 TABLET, FILM COATED ORAL DAILY
Qty: 90 TABLET | Refills: 3 | Status: SHIPPED | OUTPATIENT
Start: 2021-02-11 | End: 2021-12-30

## 2021-02-11 RX ORDER — SERTRALINE HYDROCHLORIDE 100 MG/1
100 TABLET, FILM COATED ORAL DAILY
Qty: 90 TABLET | Refills: 3 | Status: SHIPPED | OUTPATIENT
Start: 2021-02-11 | End: 2021-12-30

## 2021-02-11 NOTE — PROGRESS NOTES
Colon  dexa  Labs  See me summer discuss   Hattie is a 60 year old who is being evaluated via a billable telephone visit.      What phone number would you like to be contacted at? In [facility]  How would you like to obtain your AVS? Mail a copy    Assessment & Plan     Major depressive disorder, recurrent episode, moderate (H)  Cont works well  - sertraline (ZOLOFT) 100 MG tablet; Take 1 tablet (100 mg) by mouth daily    High blood cholesterol  See me for labs summer  - atorvastatin (LIPITOR) 40 MG tablet; Take 1 tablet (40 mg) by mouth daily    Benign essential hypertension  See me in summer  - amLODIPine (NORVASC) 5 MG tablet; Take 1 tablet (5 mg) by mouth daily      20 minutes spent on the date of the encounter doing chart review, history and exam, documentation and further activities as noted above. See me summer; labs, dexa, colonoscoopy then, check BP.    Yg Sharp MD  [facility]   Hattie is a 60 year old who presents for the following health issues    HPI No medical issues since last seen by me. Interval cardio, neuro, gyn notes rvwd. Will do mammogram at [facility]. On PPI for GERD w/ good relief, bp/lipid meds too.  Due for labs summer, colonoscopy and dexa too    Past Medical History:   Diagnosis Date     Apnea, sleep     reeval  Mild NOELLE     Essential hypertension 2013     Migraine headaches      Seizure disorder, complex partial (H)      Seizures (H)      Past Surgical History:   Procedure Laterality Date      SECTION      3x     COLONOSCOPY  2011    Procedure:COMBINED COLONOSCOPY, REMOVE TUMOR/POLYP/LESION BY SNARE; Surgeon:PAULA BEAN; Location: GI     ENDOSCOPIC RELEASE CARPAL TUNNEL  2013    Procedure: ENDOSCOPIC RELEASE CARPAL TUNNEL;  Left Endoscopic Carpal Tunnel Release;  Surgeon: Kate Najera MD;  Location: [facility]     ENDOSCOPIC RELEASE CARPAL TUNNEL  2013    Procedure: ENDOSCOPIC  RELEASE CARPAL TUNNEL;  Right Endoscopic Carpal Tunnel Release  ;  Surgeon: Kate Najera MD;  Location: [facility]     HYSTERECTOMY      Ovaries intact.  No cancer.      Current Outpatient Medications   Medication     amLODIPine (NORVASC) 5 MG tablet     atorvastatin (LIPITOR) 40 MG tablet     cetirizine (ZYRTEC) 10 MG tablet     fexofenadine-pseudoephedrine (ALLEGRA-D 12 HOUR)  MG per tablet     ibuprofen (ADVIL/MOTRIN) 800 MG tablet     lamoTRIgine (LAMICTAL) 25 MG tablet     levETIRAcetam (KEPPRA) 500 MG tablet     magnesium oxide (MAG-OX) 400 MG tablet     mometasone (NASONEX) 50 MCG/ACT nasal spray     pantoprazole (PROTONIX) 40 MG EC tablet     sertraline (ZOLOFT) 100 MG tablet     No current facility-administered medications for this visit.      Allergies   Allergen Reactions     Nkda [No Known Drug Allergies]      Seasonal Allergies                Review of Systems         Objective           Vitals:  No vitals were obtained today due to virtual visit.    Physical Exam   healthy, alert and no distress  PSYCH: Alert and oriented times 3; coherent speech, normal   rate and volume, able to articulate logical thoughts, able   to abstract reason, no tangential thoughts, no hallucinations   or delusions  Her affect is normal  RESP: No cough, no audible wheezing, able to talk in full sentences  Remainder of exam unable to be completed due to telephone visits            Phone call duration: 13 minutes

## 2021-02-11 NOTE — NURSING NOTE
Chief Complaint   Patient presents with     Recheck Medication     pt would like to follow up on jane Boggs CMA, EMT at 2:53 PM on 2/11/2021.

## 2021-03-29 ENCOUNTER — TELEPHONE (OUTPATIENT)
Dept: FAMILY MEDICINE | Facility: CLINIC | Age: 61
End: 2021-03-29

## 2021-03-29 DIAGNOSIS — K21.9 GERD (GASTROESOPHAGEAL REFLUX DISEASE): Primary | ICD-10-CM

## 2021-03-29 RX ORDER — FAMOTIDINE 40 MG/1
40 TABLET, FILM COATED ORAL DAILY
Qty: 90 TABLET | Refills: 3 | Status: SHIPPED | OUTPATIENT
Start: 2021-03-29 | End: 2021-04-05 | Stop reason: ALTCHOICE

## 2021-03-29 NOTE — TELEPHONE ENCOUNTER
[facility]    Phone Message    May a detailed message be left on voicemail: yes     Reason for Call: Medication Question or concern regarding medication   Prescription Clarification  Name of Medication:esomeprazole (NEXIUM) 40 MG DR capsule  Prescribing Provider: Aron    Pharmacy: [facility] - [address]    What on the order needs clarification? Patient states that insurance no longer covers. Patient requesting an alternative medication. Thank you!           Action Taken: Message routed to:  [facility]    Travel Screening: Not Applicable

## 2021-03-29 NOTE — TELEPHONE ENCOUNTER
Ok we could try famotidine 40 mg one a day 90 pills three refills for gerd  Call after a week or two of it if not sufficient

## 2021-04-02 DIAGNOSIS — K21.9 GERD (GASTROESOPHAGEAL REFLUX DISEASE): Primary | ICD-10-CM

## 2021-04-02 NOTE — TELEPHONE ENCOUNTER
[facility]    Phone Message    May a detailed message be left on voicemail: yes     Reason for Call: Medication Question or concern regarding medication   Prescription Clarification    Name of Medication:   * pantoprazole 40mg     Prescribing Provider: gomez     Pharmacy: [facility] - [address]     What on the order needs clarification? Per Patient is wondering if able to be put on a different medication as the medication pantoprazole is not covered by insurance. Please advise    Patient is wanting to get a call back      Action Taken: Message routed to:  [facility]    Travel Screening: Not Applicable

## 2021-04-05 RX ORDER — OMEPRAZOLE 40 MG/1
40 CAPSULE, DELAYED RELEASE ORAL DAILY
Qty: 90 CAPSULE | Refills: 3 | Status: SHIPPED | OUTPATIENT
Start: 2021-04-05 | End: 2022-03-25

## 2021-04-05 NOTE — TELEPHONE ENCOUNTER
Called and left patient VM to return call.  Dr. Sharp would like to know if patient is taking famotidine.  If yes, is the medication working?  If not, then provider can send in a new Rx.      Michael Miles CMA ([facility]) at 11:52 AM on 4/5/2021

## 2021-04-05 NOTE — TELEPHONE ENCOUNTER
[facility] Call Center    Phone Message    May a detailed message be left on voicemail: yes     Reason for Call: Medication Question or concern regarding medication   Prescription Clarification  Name of Medication: famotidine (PEPCID) 40 MG tablet   Prescribing Provider: Yg Sharp   Pharmacy: [id number] [address] AT [facility] [address] [facility] [address]   What on the order needs clarification? Patient would like Michael to know that she is currently not taking the medication and would like for a new script to be sent to the pharmacy listed.    Action Taken: Message routed to:  [facility]    Travel Screening: Not Applicable

## 2021-04-05 NOTE — TELEPHONE ENCOUNTER
Did she try famotidine? If so and did not work, and if nexium and pantoprazole not covered try prilosec 40 mg one a day for gerd ok 90, three refills

## 2021-07-22 DIAGNOSIS — G40.219 PARTIAL SYMPTOMATIC EPILEPSY WITH COMPLEX PARTIAL SEIZURES, INTRACTABLE, WITHOUT STATUS EPILEPTICUS (H): ICD-10-CM

## 2021-07-22 DIAGNOSIS — G40.309 GENERALIZED CONVULSIVE EPILEPSY (H): ICD-10-CM

## 2021-07-25 RX ORDER — LEVETIRACETAM 500 MG/1
TABLET ORAL
Qty: 150 TABLET | Refills: 0 | Status: SHIPPED | OUTPATIENT
Start: 2021-07-25 | End: 2021-08-31

## 2021-07-25 RX ORDER — LAMOTRIGINE 25 MG/1
TABLET ORAL
Qty: 180 TABLET | Refills: 0 | Status: SHIPPED | OUTPATIENT
Start: 2021-07-25 | End: 2021-08-31

## 2021-08-16 DIAGNOSIS — R51.9 HEADACHE, UNSPECIFIED HEADACHE TYPE: ICD-10-CM

## 2021-08-16 RX ORDER — IBUPROFEN 800 MG/1
800 TABLET, FILM COATED ORAL EVERY 8 HOURS PRN
Qty: 90 TABLET | Refills: 0 | Status: ON HOLD | OUTPATIENT
Start: 2021-08-16 | End: 2022-04-24

## 2021-08-16 NOTE — TELEPHONE ENCOUNTER
[facility] Call Center    Phone Message    May a detailed message be left on voicemail: yes     Reason for Call: Medication Refill Request    Has the patient contacted the pharmacy for the refill? Yes   Name of medication being requested: Ibuprofen 800mg  Provider who prescribed the medication: Dr Sharp  Pharmacy:      [facility] - [facility] [address] [facility], [address]    Date medication is needed: ASAP   Pt has not had this in a couple of years         Action Taken: Message routed to:  [facility]    Travel Screening: Not Applicable

## 2021-08-16 NOTE — TELEPHONE ENCOUNTER
Last Clinic Visit: 2/11/21, no upcoming appointments.    Last Blood pressure in care everywhere 5/17/21 at 118/72  Overdue for ALT/AST, CBC, creatinine.  90 day refill provided, routed to clinic for follow up

## 2021-08-27 DIAGNOSIS — G40.219 PARTIAL SYMPTOMATIC EPILEPSY WITH COMPLEX PARTIAL SEIZURES, INTRACTABLE, WITHOUT STATUS EPILEPTICUS (H): ICD-10-CM

## 2021-08-27 DIAGNOSIS — G40.309 GENERALIZED CONVULSIVE EPILEPSY (H): ICD-10-CM

## 2021-08-31 ENCOUNTER — TELEPHONE (OUTPATIENT)
Dept: NEUROLOGY | Facility: CLINIC | Age: 61
End: 2021-08-31

## 2021-08-31 DIAGNOSIS — G40.219 PARTIAL SYMPTOMATIC EPILEPSY WITH COMPLEX PARTIAL SEIZURES, INTRACTABLE, WITHOUT STATUS EPILEPTICUS (H): ICD-10-CM

## 2021-08-31 DIAGNOSIS — G40.309 GENERALIZED CONVULSIVE EPILEPSY (H): ICD-10-CM

## 2021-08-31 RX ORDER — LEVETIRACETAM 500 MG/1
TABLET ORAL
Qty: 150 TABLET | Refills: 0 | OUTPATIENT
Start: 2021-08-31

## 2021-08-31 RX ORDER — LEVETIRACETAM 500 MG/1
TABLET ORAL
Qty: 150 TABLET | Refills: 5 | Status: SHIPPED | OUTPATIENT
Start: 2021-08-31 | End: 2022-03-03

## 2021-08-31 RX ORDER — LAMOTRIGINE 25 MG/1
TABLET ORAL
Qty: 180 TABLET | Refills: 5 | Status: SHIPPED | OUTPATIENT
Start: 2021-08-31 | End: 2022-03-03

## 2021-08-31 NOTE — TELEPHONE ENCOUNTER
Refill requested for: Lamotrigine 25 mg And Keppra 500 mg    Last ordered: 7/25/21 (one month supply each)    Last Filled:     Last Clinic Visit: 7/6/20    Next Clinic Visit: 12/16/21    Labs: Pending for both meds    From last visit note:  Her laboratory data in the past has been done and this has been therapeutic.  With her last visit, her level of levetiracetam was quite good at 37.  She does not require high levels of lamotrigine and it was 3.7.  Since her last visit, she has had no other health issues    Action taken:   Refills sent for 6 months

## 2021-08-31 NOTE — TELEPHONE ENCOUNTER
Rx Authorization:  Requested Medication/ Dose levETIRAcetam (KEPPRA) 500 MG tablet  Date last refill ordered: 7/25/21  Quantity ordered: 150  # refills: 0  Date of last clinic visit with ordering provider: 7/6/20  Date of next clinic visit with ordering provider:   All pertinent protocol data (lab date/result):   Include pertinent information from patients message:         Rx Authorization:  Requested Medication/ Dose levETIRAcetam (KEPPRA) 500 MG tablet  Date last refill ordered: 7/25/21  Quantity ordered: 180  # refills: 0  Date of last clinic visit with ordering provider: 7/6/20  Date of next clinic visit with ordering provider:   All pertinent protocol data (lab date/result):   Include pertinent information from patients message:

## 2021-08-31 NOTE — TELEPHONE ENCOUNTER
[facility] Call Center    Phone Message    May a detailed message be left on voicemail: yes     Reason for Call: Medication Refill Request    Has the patient contacted the pharmacy for the refill? Yes   Name of medication being requested: levETIRAcetam (KEPPRA) 500 MG tablet and lamoTRIgine (LAMICTAL) 25 MG tablet  Provider who prescribed the medication: Dr. Freitas  Pharmacy: [facility] #[id number] [place], [place] - [address] AT [facility] [address]  Date medication is needed: ASAP     Action Taken: Message routed to:  [facility]    Travel Screening: Not Applicable

## 2021-09-05 ENCOUNTER — HEALTH MAINTENANCE LETTER (OUTPATIENT)
Age: 61
End: 2021-09-05

## 2021-09-07 ENCOUNTER — TELEPHONE (OUTPATIENT)
Dept: NEUROLOGY | Facility: CLINIC | Age: 61
End: 2021-09-07

## 2021-09-07 DIAGNOSIS — G40.309 GENERALIZED CONVULSIVE EPILEPSY (H): Primary | ICD-10-CM

## 2021-09-07 NOTE — TELEPHONE ENCOUNTER
"[facility] Call Center    Phone Message    May a detailed message be left on voicemail: yes     Reason for Call: Symptoms or Concerns     If patient has red-flag symptoms, warm transfer to triage line    Current symptom or concern: Seizures    Symptoms have been present for:  2 week(s)    Has patient previously been seen for this? Yes    By Dr. Freitas    Are there any new or worsening symptoms? Yes: Per Pt she thinks she is having \"mini\" seizures. Her daughter insists that she is indeed having seizures. Pt is scheduled with Dr. Freitas on 12/16/21, but is concerned about having to wait that long. She asks if maybe she could have some labs done or wonders what else could be done at this time. She wonders too if she can get in sooner.    Please call Pt back ASA. Pt works 10am-2pm tomorrow.  If unable to reach Pt, please call daughter Kristin at [phone].      Action Taken: Message routed to:  [facility] Neurology    Travel Screening: Not Applicable                                                                        "

## 2021-09-08 NOTE — TELEPHONE ENCOUNTER
Placed call to daughter per patient request as patient  is at work. (Daughter is working from patient's home).     Daughter indicated that patient is having frequent seizures, that patient is poor self-advocate, forgets to make appointments, and has occasionally been without her meds.    Patient currently has an appointment for 12/16/21 but they would like her to get in sooner and this nurse will speak with Dr. Freitas about adding her to current schedule.    Placed orders for blood levels of AEDs and will watch for these.    Patient has refill available through her current  appointment date.    Gopal Melendez, RN

## 2021-09-10 ENCOUNTER — LAB (OUTPATIENT)
Dept: LAB | Facility: CLINIC | Age: 61
End: 2021-09-10
Attending: PSYCHIATRY & NEUROLOGY
Payer: COMMERCIAL

## 2021-09-10 DIAGNOSIS — G40.309 GENERALIZED CONVULSIVE EPILEPSY (H): ICD-10-CM

## 2021-09-10 PROCEDURE — 80177 DRUG SCRN QUAN LEVETIRACETAM: CPT | Performed by: PATHOLOGY

## 2021-09-10 PROCEDURE — 36415 COLL VENOUS BLD VENIPUNCTURE: CPT | Performed by: PATHOLOGY

## 2021-09-10 PROCEDURE — 80175 DRUG SCREEN QUAN LAMOTRIGINE: CPT | Mod: 90 | Performed by: PATHOLOGY

## 2021-09-11 LAB
LAMOTRIGINE SERPL-MCNC: 4.2 UG/ML
LEVETIRACETAM SERPL-MCNC: 40 UG/ML

## 2021-10-31 ENCOUNTER — HEALTH MAINTENANCE LETTER (OUTPATIENT)
Age: 61
End: 2021-10-31

## 2021-12-16 ENCOUNTER — LAB (OUTPATIENT)
Dept: LAB | Facility: CLINIC | Age: 61
End: 2021-12-16
Payer: COMMERCIAL

## 2021-12-16 ENCOUNTER — OFFICE VISIT (OUTPATIENT)
Dept: NEUROLOGY | Facility: CLINIC | Age: 61
End: 2021-12-16
Payer: COMMERCIAL

## 2021-12-16 VITALS
DIASTOLIC BLOOD PRESSURE: 83 MMHG | OXYGEN SATURATION: 95 % | HEART RATE: 72 BPM | SYSTOLIC BLOOD PRESSURE: 127 MMHG | RESPIRATION RATE: 16 BRPM

## 2021-12-16 DIAGNOSIS — G40.219 PARTIAL SYMPTOMATIC EPILEPSY WITH COMPLEX PARTIAL SEIZURES, INTRACTABLE, WITHOUT STATUS EPILEPTICUS (H): Primary | ICD-10-CM

## 2021-12-16 DIAGNOSIS — G40.219 PARTIAL SYMPTOMATIC EPILEPSY WITH COMPLEX PARTIAL SEIZURES, INTRACTABLE, WITHOUT STATUS EPILEPTICUS (H): ICD-10-CM

## 2021-12-16 PROCEDURE — 99213 OFFICE O/P EST LOW 20 MIN: CPT | Performed by: PSYCHIATRY & NEUROLOGY

## 2021-12-16 PROCEDURE — 80177 DRUG SCRN QUAN LEVETIRACETAM: CPT | Mod: 90 | Performed by: PATHOLOGY

## 2021-12-16 PROCEDURE — 99000 SPECIMEN HANDLING OFFICE-LAB: CPT | Performed by: PATHOLOGY

## 2021-12-16 PROCEDURE — 80175 DRUG SCREEN QUAN LAMOTRIGINE: CPT | Mod: 90 | Performed by: PATHOLOGY

## 2021-12-16 PROCEDURE — 36415 COLL VENOUS BLD VENIPUNCTURE: CPT | Performed by: PATHOLOGY

## 2021-12-16 RX ORDER — ESOMEPRAZOLE MAGNESIUM 40 MG/1
40 CAPSULE, DELAYED RELEASE ORAL
COMMUNITY
Start: 2007-12-21

## 2021-12-16 ASSESSMENT — PAIN SCALES - GENERAL: PAINLEVEL: MILD PAIN (2)

## 2021-12-16 NOTE — PROGRESS NOTES
"    History of Present Illness:     Hattie Mosher is a 62yo F with a long hx of seizures (since age 30) and NOELLE who presents for routine seizure follow-up.     Patient notes she has had seizures since 1990 and describes them as episodes of \"dream-like, markie vu state\" where she can place herself in those \"dreams\" accompanied by loss of awareness. She also describes experiencing a post-ictal period of confusion and fatigue that can last up to a few hours. She denies any warning signs such as visual auras, epigastric sensations, or smells. She has tried several medication regimens but on a regimen of Lamictal 75mg BID and Keppra 1250mg BID since she was last seen in 07/2020.     She notes recently since her last visit in 07/2020, she does feel like she has had an increase in the frequency of these seizure episodes. She notes she has some a few times per week and often may have had nocturnal episodes as well since she often wakes up confused. She has had no changes in symptoms of her seizures - same as described above. Of note, her medication levels were checked in 09/20221 and were: Keppra 40 and Lamotrigine 4.2 --both within therapeutic range.     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 Currently on Sertraline 100mg daily.    She denies any other health changes. Has not had COVID. Is vaccinated fully.     Wondering about establishing care with a new provider Dr tang.    Agree with medicla student note.   disctated other note.  fiol.        "

## 2021-12-16 NOTE — LETTER
"12/16/2021       RE: Hattie Mosher  [address] [facility] [address] [address]     Dear Colleague,    Thank you for referring your patient, Hattie Mosher, to the [facility] NEUROLOGY [facility]IN[facility] [place] at [facility]. Please see a copy of my visit note below.    History of Present Illness:     Hattie Mosher is a 62yo F with a long hx of seizures (since age 30) and NOELLE who presents for routine seizure follow-up.     Patient notes she has had seizures since 1990 and describes them as episodes of \"dream-like, markie vu state\" where she can place herself in those \"dreams\" accompanied by loss of awareness. She also describes experiencing a post-ictal period of confusion and fatigue that can last up to a few hours. She denies any warning signs such as visual auras, epigastric sensations, or smells. She has tried several medication regimens but on a regimen of Lamictal 75mg BID and Keppra 1250mg BID since she was last seen in 07/2020.     She notes recently since her last visit in 07/2020, she does feel like she has had an increase in the frequency of these seizure episodes. She notes she has some a few times per week and often may have had nocturnal episodes as well since she often wakes up confused. She has had no changes in symptoms of her seizures - same as described above. Of note, her medication levels were checked in 09/20221 and were: Keppra 40 and Lamotrigine 4.2 --both within therapeutic range.     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 Currently on Sertraline 100mg daily.    She denies any other health changes. Has not had COVID. Is vaccinated fully.     Wondering about establishing care with a new provider Dr tang.    Agree with medicla student note.   disctated other note.  fiol.          Service Date: 12/16/2021    Yg Sharp MD  [facility]  [address] [place] [address]    RE:      Hattie Mosher  MRN:  [id number]  :   [id number]    Dear Dr. Sharp:    Mrs. Lowry is a 60-year-old.  I have followed her for some time with a long history of seizures since the age of 30.  She came today for followup seizures.  Seizures started in , and she had an episode of markie vu, and she has had some episodes of postictal confusion and fatigue.  A lot of her episodes were at night, and she was not aware of them, but her  was.  I have followed her for years.  Her last visit with me was in July of last year and these episodes seem to have increased.  She feels that during the day, she has had some episodes recently, which she did not have before.  She is driving and she says these do not make her lose consciousness, but she has a sensation of markie vu.  She has not been seen for a while and her anti-seizure medications consist of levetiracetam 1250 twice a day and she is also on lamotrigine 75 twice a day.  Her last concentrations were a year ago and these were recorded then as levetiracetam 40 and Lamictal was low.  The Lamictal has always been low.  She has not really ever complained of having this diurnal type of episode, so I take them at hand.    PHYSICAL EXAMINATION:  On exam today, she is essentially unchanged from before, pleasant.  She will be following with Dr. Sanders in our [facility], who had taken care of her before when she was hospitalized.  We might need to increase her lamotrigine dose.  The last level was actually on 09/10/2021.  We will repeat them today.    She is a most pleasant patient.  She is working, enjoying herself and doing good.    Cesar Freitas MD

## 2021-12-16 NOTE — LETTER
"12/16/2021       RE: Hattie Mosher  [address] [address]     Dear Colleague,    Thank you for referring your patient, Hattie Mosher, to the [facility] NEUROLOGY CLINIC [place] at [facility]. Please see a copy of my visit note below.        History of Present Illness:     Hattie Mosher is a 62yo F with a long hx of seizures (since age 30) and NOELLE who presents for routine seizure follow-up.     Patient notes she has had seizures since 1990 and describes them as episodes of \"dream-like, markie vu state\" where she can place herself in those \"dreams\" accompanied by loss of awareness. She also describes experiencing a post-ictal period of confusion and fatigue that can last up to a few hours. She denies any warning signs such as visual auras, epigastric sensations, or smells. She has tried several medication regimens but on a regimen of Lamictal 75mg BID and Keppra 1250mg BID since she was last seen in 07/2020.     She notes recently since her last visit in 07/2020, she does feel like she has had an increase in the frequency of these seizure episodes. She notes she has some a few times per week and often may have had nocturnal episodes as well since she often wakes up confused. She has had no changes in symptoms of her seizures - same as described above. Of note, her medication levels were checked in 09/20221 and were: Keppra 40 and Lamotrigine 4.2 --both within therapeutic range.     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 Currently on Sertraline 100mg daily.    She denies any other health changes. Has not had COVID. Is vaccinated fully.     Wondering about establishing care with a new provider Dr tang.    Agree with medicla student note.   disctated other note.  filuiz.            Again, thank you for allowing me to participate in the care of your patient.  "     Sincerely,    Cesar Freitas MD

## 2021-12-17 LAB
LAMOTRIGINE SERPL-MCNC: 4.9 UG/ML
LEVETIRACETAM SERPL-MCNC: 33 UG/ML

## 2021-12-17 NOTE — PROGRESS NOTES
Service Date: 2021    Yg Sharp MD  [facility]  [address] [place] [address]    RE:      Hattie Mosher  MRN:  [id number]  :   [id number]    Dear Dr. Sharp:    Mrs. Lowry is a 60-year-old.  I have followed her for some time with a long history of seizures since the age of 30.  She came today for followup seizures.  Seizures started in , and she had an episode of markie vu, and she has had some episodes of postictal confusion and fatigue.  A lot of her episodes were at night, and she was not aware of them, but her  was.  I have followed her for years.  Her last visit with me was in July of last year and these episodes seem to have increased.  She feels that during the day, she has had some episodes recently, which she did not have before.  She is driving and she says these do not make her lose consciousness, but she has a sensation of markie vu.  She has not been seen for a while and her anti-seizure medications consist of levetiracetam 1250 twice a day and she is also on lamotrigine 75 twice a day.  Her last concentrations were a year ago and these were recorded then as levetiracetam 40 and Lamictal was low.  The Lamictal has always been low.  She has not really ever complained of having this diurnal type of episode, so I take them at hand.    PHYSICAL EXAMINATION:  On exam today, she is essentially unchanged from before, pleasant.  She will be following with Dr. Sanders in our [facility], who had taken care of her before when she was hospitalized.  We might need to increase her lamotrigine dose.  The last level was actually on 09/10/2021.  We will repeat them today.    She is a most pleasant patient.  She is working, enjoying herself and doing good.    Cesar Freitas MD        D: 2021   T: 2021   MT: al    Name:     HATTIE MOSHER  MRN:      [id number]        Account:      [id number]   :      1960           Service Date:  12/16/2021       Document: [id number]

## 2021-12-27 DIAGNOSIS — F33.1 MAJOR DEPRESSIVE DISORDER, RECURRENT EPISODE, MODERATE (H): ICD-10-CM

## 2021-12-27 DIAGNOSIS — I10 BENIGN ESSENTIAL HYPERTENSION: ICD-10-CM

## 2021-12-27 DIAGNOSIS — E78.00 HIGH BLOOD CHOLESTEROL: ICD-10-CM

## 2021-12-30 RX ORDER — SERTRALINE HYDROCHLORIDE 100 MG/1
100 TABLET, FILM COATED ORAL DAILY
Qty: 90 TABLET | Refills: 0 | Status: SHIPPED | OUTPATIENT
Start: 2021-12-30 | End: 2022-03-25

## 2021-12-30 RX ORDER — AMLODIPINE BESYLATE 5 MG/1
5 TABLET ORAL DAILY
Qty: 90 TABLET | Refills: 0 | Status: SHIPPED | OUTPATIENT
Start: 2021-12-30 | End: 2022-03-25

## 2021-12-30 RX ORDER — ATORVASTATIN CALCIUM 40 MG/1
40 TABLET, FILM COATED ORAL DAILY
Qty: 90 TABLET | Refills: 0 | Status: SHIPPED | OUTPATIENT
Start: 2021-12-30 | End: 2022-03-25

## 2022-01-05 NOTE — TELEPHONE ENCOUNTER
Signed lipid per standing order  Krista Kimbrough, EMT at 11:56 AM on 1/5/2022.  [facility]  Clinics and Surgery Center  [place]  [phone]

## 2022-01-06 ENCOUNTER — OFFICE VISIT (OUTPATIENT)
Dept: OPHTHALMOLOGY | Facility: CLINIC | Age: 62
End: 2022-01-06
Attending: OPHTHALMOLOGY
Payer: COMMERCIAL

## 2022-01-06 DIAGNOSIS — H43.813 POSTERIOR VITREOUS DETACHMENT, BILATERAL: ICD-10-CM

## 2022-01-06 DIAGNOSIS — H43.313 VITREOUS STRAND, BILATERAL: Primary | ICD-10-CM

## 2022-01-06 DIAGNOSIS — H32 PRESUMED OCULAR HISTOPLASMOSIS SYNDROME (POHS): ICD-10-CM

## 2022-01-06 DIAGNOSIS — B39.9 PRESUMED OCULAR HISTOPLASMOSIS SYNDROME (POHS): ICD-10-CM

## 2022-01-06 PROCEDURE — 92014 COMPRE OPH EXAM EST PT 1/>: CPT | Mod: GC | Performed by: OPHTHALMOLOGY

## 2022-01-06 PROCEDURE — 92015 DETERMINE REFRACTIVE STATE: CPT

## 2022-01-06 PROCEDURE — G0463 HOSPITAL OUTPT CLINIC VISIT: HCPCS | Mod: 25

## 2022-01-06 ASSESSMENT — REFRACTION_WEARINGRX
SPECS_TYPE: PAL
OD_ADD: +2.25
OD_CYLINDER: +1.00
OD_AXIS: 053
OS_ADD: +2.25
OS_AXIS: 095
OD_SPHERE: -4.00
OS_CYLINDER: +2.00
OS_SPHERE: -5.00

## 2022-01-06 ASSESSMENT — REFRACTION_MANIFEST
OS_CYLINDER: +1.50
OS_AXIS: 098
OD_CYLINDER: +1.00
OD_ADD: +2.50
OD_SPHERE: -4.00
OS_ADD: +2.50
OS_SPHERE: -5.00
OD_AXIS: 053

## 2022-01-06 ASSESSMENT — TONOMETRY
IOP_METHOD: TONOPEN
OS_IOP_MMHG: 13
OD_IOP_MMHG: 13

## 2022-01-06 ASSESSMENT — SLIT LAMP EXAM - LIDS
COMMENTS: NORMAL
COMMENTS: NORMAL

## 2022-01-06 ASSESSMENT — VISUAL ACUITY
OS_CC+: -1
CORRECTION_TYPE: GLASSES
OS_CC: 20/20
OD_CC+: -1
OD_CC: 20/20
METHOD: SNELLEN - LINEAR

## 2022-01-06 ASSESSMENT — CONF VISUAL FIELD
METHOD: COUNTING FINGERS
OD_NORMAL: 1
OS_NORMAL: 1

## 2022-01-06 ASSESSMENT — EXTERNAL EXAM - RIGHT EYE: OD_EXAM: NORMAL

## 2022-01-06 ASSESSMENT — CUP TO DISC RATIO
OS_RATIO: 0.25
OD_RATIO: 0.15

## 2022-01-06 ASSESSMENT — EXTERNAL EXAM - LEFT EYE: OS_EXAM: NORMAL

## 2022-01-06 NOTE — PROGRESS NOTES
Ophthalmology Acute Clinic Note    CC:  Blur intermittently right eye     HPI:  HPI     Annual Eye Exam     In both eyes.  Since onset it is stable.  Associated symptoms include floaters.  Negative for flashes, eye pain and dryness.  Treatments tried include no treatments.  Pain was noted as 0/10.              Comments     She states that her vision has seemed stable in both eyes since her last eye exam.  She has been more bothered by floater in the 6 months.  Her glasses broke recently so she would like an updated script.    Catie Bliss COT 2:15 PM  January 6, 2022             Last edited by Catie Bliss COT on 1/6/2022  2:16 PM. (History)        Has floaters in both eyes long standing for years and finds them increasingly noticeable - previously were less so. Denies flashes. Comes in today because her glasses broke.     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  Monitor  Return precautions reviewed     Myopia, astigmatism, presbyopia, each eye,   - Discussed and dispensed new mrx    PVD, each eye   - Complains of increasingly bothersome floaters in both eyes but only intermittently: R> L  - Discussed RBA to YAG vitreolysis  - Will monitor for now    Disposition:  Return in about 1 year (around 1/6/2023) for DFE.     Seen and discussed with Dr. Kristofer Tello.    Choco Deras MD  Resident Physician - PGY2  Department of Ophthalmology   [facility]    Attending Physician Attestation:  Complete documentation of historical and exam elements from  today's encounter can be found in the full encounter summary report (not reduplicated in this progress note).  I personally obtained the chief complaint(s) and history of present illness.  I confirmed and edited as necessary the review of systems, past medical/surgical history, family history, social history, and examination findings as documented by others; and I examined the patient myself.  I personally reviewed the relevant tests, images, and reports as documented above.  I formulated and edited as necessary the assessment and plan and discussed the findings and management plan with the patient and family. . - Kristofer Tello MD

## 2022-01-06 NOTE — NURSING NOTE
Chief Complaints and History of Present Illnesses   Patient presents with     Annual Eye Exam     Chief Complaint(s) and History of Present Illness(es)     Annual Eye Exam     Laterality: both eyes    Course: stable    Associated symptoms: floaters.  Negative for flashes, eye pain and dryness    Treatments tried: no treatments    Pain scale: 0/10              Comments     She states that her vision has seemed stable in both eyes since her last eye exam.  She has been more bothered by floater in the 6 months.  Her glasses broke recently so she would like an updated script.    Catie Bliss, COT 2:15 PM  January 6, 2022

## 2022-02-20 ENCOUNTER — HEALTH MAINTENANCE LETTER (OUTPATIENT)
Age: 62
End: 2022-02-20

## 2022-02-25 DIAGNOSIS — G40.309 GENERALIZED CONVULSIVE EPILEPSY (H): ICD-10-CM

## 2022-02-25 DIAGNOSIS — G40.219 PARTIAL SYMPTOMATIC EPILEPSY WITH COMPLEX PARTIAL SEIZURES, INTRACTABLE, WITHOUT STATUS EPILEPTICUS (H): ICD-10-CM

## 2022-03-01 NOTE — TELEPHONE ENCOUNTER
LAMOTRIGINE 25MG TABLETS TAKE 3 TABLETS BY MOUTH TWICE DAILY   Last Written Prescription Date:  8/31/2021  Last Fill Quantity: 180,   # refills: 5  LEVETIRACETAM 500MG TABLETS  Last Written Prescription Date:  8/31/21  Last Fill Quantity: 150,   # refills: 5  Last Office Visit : 12/16/21  Future Office visit:  6/17/21    Routing refill request to provider for review/approval because:  New to Dr Ohara, previous Fiol  Previous Fiol 12/16/21, next appt is with Dr Ohara 6/17/21  Last Na, Cr 6/30/20  Keppra and lamotrigine levels done 12/16/21      Ref. Range 12/16/2021 15:33   Lamotrigine Latest Ref Range: 2.5 - 15.0 ug/mL 4.9       Ref. Range 12/16/2021 15:33   Keppra (Levetiracetam) Level Latest Ref Range: 12 - 46 ug/mL 33

## 2022-03-01 NOTE — TELEPHONE ENCOUNTER
Rx Authorization:  Requested Medication/ Dose LAMOTRIGINE 25MG TABLETS  Date last refill ordered: 8/31/21  Quantity ordered: 180 tabs  # refills: 5  Date of last clinic visit with ordering provider: 12/16/21  Date of next clinic visit with ordering provider:   All pertinent protocol data (lab date/result):   Include pertinent information from patients message:     Rx Authorization:  Requested Medication/ Dose Keppra 500mg  Date last refill ordered: 8/31/21  Quantity ordered: 150 tabs  # refills: 5  Date of last clinic visit with ordering provider: 12/16/21  Date of next clinic visit with ordering provider:   All pertinent protocol data (lab date/result):   Include pertinent information from patients message:

## 2022-03-03 RX ORDER — LAMOTRIGINE 25 MG/1
TABLET ORAL
Qty: 180 TABLET | Refills: 5 | Status: SHIPPED | OUTPATIENT
Start: 2022-03-03 | End: 2022-09-02

## 2022-03-03 RX ORDER — LEVETIRACETAM 500 MG/1
TABLET ORAL
Qty: 150 TABLET | Refills: 5 | Status: SHIPPED | OUTPATIENT
Start: 2022-03-03 | End: 2022-09-02

## 2022-03-22 DIAGNOSIS — E78.00 HIGH BLOOD CHOLESTEROL: ICD-10-CM

## 2022-03-22 DIAGNOSIS — I10 BENIGN ESSENTIAL HYPERTENSION: ICD-10-CM

## 2022-03-22 DIAGNOSIS — F33.1 MAJOR DEPRESSIVE DISORDER, RECURRENT EPISODE, MODERATE (H): ICD-10-CM

## 2022-03-25 ENCOUNTER — OFFICE VISIT (OUTPATIENT)
Dept: FAMILY MEDICINE | Facility: CLINIC | Age: 62
End: 2022-03-25
Payer: COMMERCIAL

## 2022-03-25 VITALS
OXYGEN SATURATION: 95 % | BODY MASS INDEX: 32.59 KG/M2 | HEART RATE: 75 BPM | DIASTOLIC BLOOD PRESSURE: 84 MMHG | HEIGHT: 60 IN | SYSTOLIC BLOOD PRESSURE: 146 MMHG | WEIGHT: 166 LBS

## 2022-03-25 DIAGNOSIS — E78.00 HIGH BLOOD CHOLESTEROL: ICD-10-CM

## 2022-03-25 DIAGNOSIS — F33.1 MAJOR DEPRESSIVE DISORDER, RECURRENT EPISODE, MODERATE (H): ICD-10-CM

## 2022-03-25 DIAGNOSIS — K21.9 GASTROESOPHAGEAL REFLUX DISEASE WITHOUT ESOPHAGITIS: ICD-10-CM

## 2022-03-25 DIAGNOSIS — I10 BENIGN ESSENTIAL HYPERTENSION: ICD-10-CM

## 2022-03-25 DIAGNOSIS — Z78.0 POSTMENOPAUSAL STATUS: Primary | ICD-10-CM

## 2022-03-25 DIAGNOSIS — Z00.00 HEALTH CARE MAINTENANCE: ICD-10-CM

## 2022-03-25 PROCEDURE — 99214 OFFICE O/P EST MOD 30 MIN: CPT | Performed by: FAMILY MEDICINE

## 2022-03-25 RX ORDER — OMEPRAZOLE 40 MG/1
40 CAPSULE, DELAYED RELEASE ORAL DAILY
Qty: 90 CAPSULE | Refills: 3 | Status: SHIPPED | OUTPATIENT
Start: 2022-03-25 | End: 2023-04-03

## 2022-03-25 RX ORDER — ATORVASTATIN CALCIUM 40 MG/1
40 TABLET, FILM COATED ORAL DAILY
Qty: 90 TABLET | Refills: 3 | Status: SHIPPED | OUTPATIENT
Start: 2022-03-25 | End: 2023-02-22

## 2022-03-25 RX ORDER — SERTRALINE HYDROCHLORIDE 100 MG/1
100 TABLET, FILM COATED ORAL DAILY
Qty: 90 TABLET | Refills: 3 | Status: SHIPPED | OUTPATIENT
Start: 2022-03-25 | End: 2023-02-22

## 2022-03-25 RX ORDER — AMLODIPINE BESYLATE 5 MG/1
5 TABLET ORAL DAILY
Qty: 90 TABLET | Refills: 3 | Status: SHIPPED | OUTPATIENT
Start: 2022-03-25 | End: 2023-02-22

## 2022-03-25 NOTE — NURSING NOTE
Hattie Mosher is a 61 year old female patient that presents today in clinic for the following:    Chief Complaint   Patient presents with     RECHECK     The patient's allergies and medications were reviewed as noted. A set of vitals were recorded as noted without incident. The patient does not have any other questions for the provider.    Eagle Luis, EMT at 2:45 PM on 3/25/2022

## 2022-03-25 NOTE — PROGRESS NOTES
Assessment & Plan     Benign essential hypertension  Cont as is. Discussed diet, exercise, can walk more w/ nice weather  - CBC with platelets differential; Future  - amLODIPine (NORVASC) 5 MG tablet; Take 1 tablet (5 mg) by mouth daily    High blood cholesterol  Due  - Comprehensive metabolic panel; Future  - Lipid panel reflex to direct LDL Fasting; Future  - atorvastatin (LIPITOR) 40 MG tablet; Take 1 tablet (40 mg) by mouth daily    Major depressive disorder, recurrent episode, moderate (H)  Continue  - sertraline (ZOLOFT) 100 MG tablet; Take 1 tablet (100 mg) by mouth daily    GERD (gastroesophageal reflux disease)  Helps, tolerated  - omeprazole (PRILOSEC) 40 MG DR capsule; Take 1 capsule (40 mg) by mouth daily    Postmenopausal status  Due, baseline  - Dexa hip/pelvis/spine*; Future    Health care maintenance  Due  - Adult Gastro Ref - Procedure Only; Future      35 minutes spent on the date of the encounter doing chart review, history and exam, documentation and further activities per the note    BMI:   Estimated body mass index is 32.42 kg/m  as calculated from the following:    Height as of this encounter: 1.524 m (5').    Weight as of this encounter: 75.3 kg (166 lb).     Return in about 1 year (around 3/25/2023).    Yg Sharp MD  [facility] PRIMARY CARE CLINIC [place]    Shai Kuhn is a 61 year old who presents for the following health issues    HPI Here in f/u.  Overall well.  Still works part time, .  [facility]    H/o NOELLE, discussed, she elects not to treat, knows her choice    Sees outside gyn for annuals, mammograms she states    Due for colonoscopy    Not doing outside BP    Getting new seizure team, recent neuro notes rvwd    Due for labs    GERD well controlled w/ PPI    Mood good on zoloft, tolerated, discussed continue vs wean, she'd like to continue    Past Medical History:   Diagnosis Date     Apnea, sleep 2007     reeval  Mild NOELLE     Essential hypertension 2013     Migraine headaches      Seizure disorder, complex partial (H)      Seizures (H)      Past Surgical History:   Procedure Laterality Date      SECTION      3x     COLONOSCOPY  2011    Procedure:COMBINED COLONOSCOPY, REMOVE TUMOR/POLYP/LESION BY SNARE; Surgeon:PAULA BEAN; Location: GI     ENDOSCOPIC RELEASE CARPAL TUNNEL  2013    Procedure: ENDOSCOPIC RELEASE CARPAL TUNNEL;  Left Endoscopic Carpal Tunnel Release;  Surgeon: Kate Najera MD;  Location: [facility]     ENDOSCOPIC RELEASE CARPAL TUNNEL  2013    Procedure: ENDOSCOPIC RELEASE CARPAL TUNNEL;  Right Endoscopic Carpal Tunnel Release  ;  Surgeon: Kate Najera MD;  Location: [facility]     HYSTERECTOMY      Ovaries intact.  No cancer.      Current Outpatient Medications   Medication     amLODIPine (NORVASC) 5 MG tablet     atorvastatin (LIPITOR) 40 MG tablet     cetirizine (ZYRTEC) 10 MG tablet     esomeprazole (NEXIUM) 40 MG DR capsule     fexofenadine-pseudoephedrine (ALLEGRA-D 12 HOUR)  MG per tablet     ibuprofen (ADVIL/MOTRIN) 800 MG tablet     lamoTRIgine (LAMICTAL) 25 MG tablet     levETIRAcetam (KEPPRA) 500 MG tablet     magnesium oxide (MAG-OX) 400 MG tablet     mometasone (NASONEX) 50 MCG/ACT nasal spray     omeprazole (PRILOSEC) 40 MG DR capsule     sertraline (ZOLOFT) 100 MG tablet     No current facility-administered medications for this visit.     Allergies   Allergen Reactions     Nkda [No Known Drug Allergies]      Seasonal Allergies            Review of Systems         Objective    BP (!) 146/84 (BP Location: Right arm, Patient Position: Sitting, Cuff Size: Adult Regular)   Pulse 75   Ht 1.524 m (5')   Wt 75.3 kg (166 lb)   SpO2 95%   BMI 32.42 kg/m    Body mass index is 32.42 kg/m .  Physical Exam   GENERAL: healthy, alert and no distress  NECK: no adenopathy, no asymmetry, masses, or scars and thyroid normal to  palpation  RESP: lungs clear to auscultation - no rales, rhonchi or wheezes  CV: regular rate and rhythm, normal S1 S2, no S3 or S4, no murmur, click or rub, no peripheral edema and peripheral pulses strong  ABDOMEN: soft, nontender, no hepatosplenomegaly, no masses and bowel sounds normal  MS: no gross musculoskeletal defects noted, no edema

## 2022-03-28 ENCOUNTER — TELEPHONE (OUTPATIENT)
Dept: GASTROENTEROLOGY | Facility: CLINIC | Age: 62
End: 2022-03-28
Payer: COMMERCIAL

## 2022-03-28 DIAGNOSIS — Z11.59 ENCOUNTER FOR SCREENING FOR OTHER VIRAL DISEASES: Primary | ICD-10-CM

## 2022-03-28 NOTE — TELEPHONE ENCOUNTER
Screening Questions  BlueKIND OF PREP RedLOCATION [review exclusion criteria] GreenSEDATION TYPE  1. Have you had a positive covid test in the last 90 days? N     2. Are you active on mychart? Y    3. What insurance is in the chart? [facility]     3.   Ordering/Referring Provider: Aron    4. BMI 29.3 [BMI OVER 40-EXTENDED PREP]  If greater than 40 review exclusion criteria [PAC APPT IF @ [facility]]        5.  Respiratory Screening :  [If yes to any of the following HOSPITAL setting only]     Do you use daily home oxygen? N    Do you have mod to severe Obstructive Sleep Apnea? Yes  [OKAY @ [facility] SH PH RI]   Do you have Pulmonary Hypertension? N     Do you have UNCONTROLLED asthma? N        6.   Have you had a heart or lung transplant? N      7.   Are you currently on dialysis? N [ If yes, G-PREP & HOSPITAL setting only]     8.   Do you have chronic kidney disease? N [ If yes, G-PREP ]    9.   Have you had a stroke or Transient ischemic attack (TIA - aka  mini stroke ) within 6 months?  N (If yes, please review exclusion criteria)    10.   In the past 6 months, have you had any heart related issues including cardiomyopathy or heart attack? N           If yes, did it require cardiac stenting or other implantable device? N      11.   Do you have any implantable devices in your body (pacemaker, defib, LVAD)? N (If yes, please review exclusion criteria)    12.   Do you take nitroglycerin? N           If yes, how often? NA  (if yes, HOSPITAL setting ONLY)    13.   Are you currently taking any blood thinners? N           [IF YES, INFORM PATIENT TO FOLLOW UP W/ ORDERING PROVIDER FOR BRIDGING INSTRUCTIONS]     14.   Do you have a diagnosis of diabetes? N   [ If yes, G-PREP ]    15.   [FEMALES] Are you currently pregnant? NA    If yes, how many weeks? NA    16.   Are you taking any prescription pain medications on a routine schedule?  N  [ If yes, EXTENDED PREP.] [If yes, MAC]    17.   Do you have any chemical dependencies  such as alcohol, street drugs, or methadone?  N [If yes, MAC]    18.   Do you have any history of post-traumatic stress syndrome, severe anxiety or history of psychosis?  Yes-takes Zoloft, but had CS for past colonoscopy    [If yes, MAC]    19.   Do you have a significant intellectual disability?  N [If yes, MAC]    20.   Do you transfer independently?  Yes    21.  On a regular basis do you go 3-5 days between bowel movements? N   [ If yes, EXTENDED PREP.]    22.   Preferred LOCAL Pharmacy for Pre Prescription     [facility] | [id number] - [place], [place] - [address] AT [facility] [address]        Scheduling Details      Caller : Hattie  (Please ask for phone number if not scheduled by patient)    Type of Procedure Scheduled: Colon  Which Colonoscopy Prep was Sent?: Miralax  KHORUTS CF PATIENTS & GROEN'S PATIENTS NEEDS EXTENDED PREP  Surgeon: Leventhal  Date of Procedure: 4/25/22  Location: [facility]      Sedation Type: CS  Conscious Sedation- Needs  for 6 hours after the procedure  MAC/General-Needs  for 24 hours after procedure    Pre-op Required at [facility] [place] [place] and OR for MAC sedation: NA  (advise patient they will need a pre-op prior to procedure -)      Informed patient they will need an adult  Yes  Cannot take any type of public or medical transportation alone    Pre-Procedure [facility]id test [facility] or [facility]ternally: [facility] 4[place]21/22    Confirmed Nurse will call to complete assessment Yes    Additional comments:

## 2022-03-29 RX ORDER — ATORVASTATIN CALCIUM 40 MG/1
TABLET, FILM COATED ORAL
Qty: 90 TABLET | Refills: 3 | OUTPATIENT
Start: 2022-03-29

## 2022-03-29 RX ORDER — SERTRALINE HYDROCHLORIDE 100 MG/1
TABLET, FILM COATED ORAL
Qty: 90 TABLET | Refills: 3 | OUTPATIENT
Start: 2022-03-29

## 2022-03-29 RX ORDER — AMLODIPINE BESYLATE 5 MG/1
TABLET ORAL
Qty: 90 TABLET | Refills: 3 | OUTPATIENT
Start: 2022-03-29

## 2022-04-04 ENCOUNTER — LAB (OUTPATIENT)
Dept: LAB | Facility: CLINIC | Age: 62
End: 2022-04-04
Payer: COMMERCIAL

## 2022-04-04 ENCOUNTER — ANCILLARY PROCEDURE (OUTPATIENT)
Dept: BONE DENSITY | Facility: CLINIC | Age: 62
End: 2022-04-04
Attending: FAMILY MEDICINE
Payer: COMMERCIAL

## 2022-04-04 DIAGNOSIS — I10 BENIGN ESSENTIAL HYPERTENSION: ICD-10-CM

## 2022-04-04 DIAGNOSIS — F33.1 MAJOR DEPRESSIVE DISORDER, RECURRENT EPISODE, MODERATE (H): ICD-10-CM

## 2022-04-04 DIAGNOSIS — Z78.0 POSTMENOPAUSAL STATUS: ICD-10-CM

## 2022-04-04 DIAGNOSIS — E78.00 HIGH BLOOD CHOLESTEROL: ICD-10-CM

## 2022-04-04 LAB
ALBUMIN SERPL-MCNC: 3.9 G/DL (ref 3.4–5)
ALP SERPL-CCNC: 92 U/L (ref 40–150)
ALT SERPL W P-5'-P-CCNC: 31 U/L (ref 0–50)
ANION GAP SERPL CALCULATED.3IONS-SCNC: 6 MMOL/L (ref 3–14)
AST SERPL W P-5'-P-CCNC: 18 U/L (ref 0–45)
BASOPHILS # BLD AUTO: 0.1 10E3/UL (ref 0–0.2)
BASOPHILS NFR BLD AUTO: 1 %
BILIRUB SERPL-MCNC: 0.5 MG/DL (ref 0.2–1.3)
BUN SERPL-MCNC: 12 MG/DL (ref 7–30)
CALCIUM SERPL-MCNC: 9.2 MG/DL (ref 8.5–10.1)
CHLORIDE BLD-SCNC: 107 MMOL/L (ref 94–109)
CHOLEST SERPL-MCNC: 201 MG/DL
CO2 SERPL-SCNC: 28 MMOL/L (ref 20–32)
CREAT SERPL-MCNC: 0.77 MG/DL (ref 0.52–1.04)
EOSINOPHIL # BLD AUTO: 0.4 10E3/UL (ref 0–0.7)
EOSINOPHIL NFR BLD AUTO: 4 %
ERYTHROCYTE [DISTWIDTH] IN BLOOD BY AUTOMATED COUNT: 12.5 % (ref 10–15)
FASTING STATUS PATIENT QL REPORTED: YES
GFR SERPL CREATININE-BSD FRML MDRD: 86 ML/MIN/1.73M2
GLUCOSE BLD-MCNC: 133 MG/DL (ref 70–99)
HCT VFR BLD AUTO: 43.1 % (ref 35–47)
HDLC SERPL-MCNC: 69 MG/DL
HGB BLD-MCNC: 13.8 G/DL (ref 11.7–15.7)
IMM GRANULOCYTES # BLD: 0 10E3/UL
IMM GRANULOCYTES NFR BLD: 0 %
LDLC SERPL CALC-MCNC: 105 MG/DL
LYMPHOCYTES # BLD AUTO: 2.7 10E3/UL (ref 0.8–5.3)
LYMPHOCYTES NFR BLD AUTO: 30 %
MCH RBC QN AUTO: 30.7 PG (ref 26.5–33)
MCHC RBC AUTO-ENTMCNC: 32 G/DL (ref 31.5–36.5)
MCV RBC AUTO: 96 FL (ref 78–100)
MONOCYTES # BLD AUTO: 0.7 10E3/UL (ref 0–1.3)
MONOCYTES NFR BLD AUTO: 7 %
NEUTROPHILS # BLD AUTO: 5.2 10E3/UL (ref 1.6–8.3)
NEUTROPHILS NFR BLD AUTO: 58 %
NONHDLC SERPL-MCNC: 132 MG/DL
NRBC # BLD AUTO: 0 10E3/UL
NRBC BLD AUTO-RTO: 0 /100
PLATELET # BLD AUTO: 357 10E3/UL (ref 150–450)
POTASSIUM BLD-SCNC: 3.8 MMOL/L (ref 3.4–5.3)
PROT SERPL-MCNC: 7.7 G/DL (ref 6.8–8.8)
RBC # BLD AUTO: 4.49 10E6/UL (ref 3.8–5.2)
SODIUM SERPL-SCNC: 141 MMOL/L (ref 133–144)
TRIGL SERPL-MCNC: 137 MG/DL
WBC # BLD AUTO: 9 10E3/UL (ref 4–11)

## 2022-04-04 PROCEDURE — 36415 COLL VENOUS BLD VENIPUNCTURE: CPT | Performed by: PATHOLOGY

## 2022-04-04 PROCEDURE — 77080 DXA BONE DENSITY AXIAL: CPT | Performed by: INTERNAL MEDICINE

## 2022-04-04 PROCEDURE — 85025 COMPLETE CBC W/AUTO DIFF WBC: CPT | Performed by: PATHOLOGY

## 2022-04-04 PROCEDURE — 80053 COMPREHEN METABOLIC PANEL: CPT | Performed by: PATHOLOGY

## 2022-04-04 PROCEDURE — 80061 LIPID PANEL: CPT | Performed by: PATHOLOGY

## 2022-04-15 ENCOUNTER — TELEPHONE (OUTPATIENT)
Dept: GASTROENTEROLOGY | Facility: CLINIC | Age: 62
End: 2022-04-15
Payer: COMMERCIAL

## 2022-04-15 NOTE — TELEPHONE ENCOUNTER
Attempted to contact patient regarding upcoming Colonoscopy procedure on 4/25/22 for pre assessment questions.  No answer.  Left message to return call to [phone] #2    Patient scheduled for Colonoscopy on 4/25/22.     Covid test scheduled: 4/21/22    Arrival time: 8:15am    Facility location: [facility]    Sedation type: CS    Indication for procedure: Screening    Anticoagulations? None      Bowel prep recommendation: Miralax    Prep instructions sent via [employer]-Spot    Pre visit planning completed.    Vangie Posadas RN

## 2022-04-20 NOTE — TELEPHONE ENCOUNTER
Second attempt for pre-assessment prior to upcoming colonoscopy.    No answer.  Attempted to leave a voicemail however line ended.  [employer]  Message sent.    Grace Daniels RN

## 2022-04-21 ENCOUNTER — LAB (OUTPATIENT)
Dept: LAB | Facility: CLINIC | Age: 62
End: 2022-04-21

## 2022-04-21 DIAGNOSIS — Z11.59 ENCOUNTER FOR SCREENING FOR OTHER VIRAL DISEASES: ICD-10-CM

## 2022-04-21 PROCEDURE — U0003 INFECTIOUS AGENT DETECTION BY NUCLEIC ACID (DNA OR RNA); SEVERE ACUTE RESPIRATORY SYNDROME CORONAVIRUS 2 (SARS-COV-2) (CORONAVIRUS DISEASE [COVID-19]), AMPLIFIED PROBE TECHNIQUE, MAKING USE OF HIGH THROUGHPUT TECHNOLOGIES AS DESCRIBED BY CMS-2020-01-R: HCPCS | Performed by: INTERNAL MEDICINE

## 2022-04-22 LAB — SARS-COV-2 RNA RESP QL NAA+PROBE: NEGATIVE

## 2022-04-22 NOTE — TELEPHONE ENCOUNTER
Pre assessment questions completed for upcoming colonoscopy procedure scheduled on 4.25.2022    COVID test 4.21.2022-in process    Reviewed procedural arrival time 0815 and facility location colonoscopy.    Designated  policy reviewed. Instructed to have someone stay 6 hours post procedure.     Anticoagulation/blood thinners? no    Electronic implanted devices? no    Reviewed Miralax/Magnesium citrate/Dulcolax prep instructions with patient. No fiber/iron supplements or foods that contain nuts/seeds prior to procedure.     Patient verbalized understanding and had no questions or concerns at this time.    Grace Daniels RN

## 2022-04-24 ENCOUNTER — MYC REFILL (OUTPATIENT)
Dept: FAMILY MEDICINE | Facility: CLINIC | Age: 62
End: 2022-04-24
Payer: COMMERCIAL

## 2022-04-24 DIAGNOSIS — R51.9 HEADACHE, UNSPECIFIED HEADACHE TYPE: ICD-10-CM

## 2022-04-25 ENCOUNTER — HOSPITAL ENCOUNTER (OUTPATIENT)
Facility: CLINIC | Age: 62
Discharge: HOME | End: 2022-04-25
Attending: INTERNAL MEDICINE | Admitting: INTERNAL MEDICINE
Payer: COMMERCIAL

## 2022-04-25 VITALS
HEIGHT: 60 IN | HEART RATE: 62 BPM | BODY MASS INDEX: 31.9 KG/M2 | DIASTOLIC BLOOD PRESSURE: 65 MMHG | WEIGHT: 162.48 LBS | SYSTOLIC BLOOD PRESSURE: 122 MMHG | TEMPERATURE: 98 F | RESPIRATION RATE: 16 BRPM | OXYGEN SATURATION: 95 %

## 2022-04-25 LAB — COLONOSCOPY: NORMAL

## 2022-04-25 PROCEDURE — 45378 DIAGNOSTIC COLONOSCOPY: CPT | Performed by: INTERNAL MEDICINE

## 2022-04-25 PROCEDURE — G0121 COLON CA SCRN NOT HI RSK IND: HCPCS | Performed by: INTERNAL MEDICINE

## 2022-04-25 PROCEDURE — 99153 MOD SED SAME PHYS/QHP EA: CPT | Performed by: INTERNAL MEDICINE

## 2022-04-25 PROCEDURE — 250N000011 HC RX IP 250 OP 636: Performed by: INTERNAL MEDICINE

## 2022-04-25 PROCEDURE — G0500 MOD SEDAT ENDO SERVICE >5YRS: HCPCS | Performed by: INTERNAL MEDICINE

## 2022-04-25 RX ORDER — NALOXONE HYDROCHLORIDE 0.4 MG/ML
0.4 INJECTION, SOLUTION INTRAMUSCULAR; INTRAVENOUS; SUBCUTANEOUS
Status: CANCELLED | OUTPATIENT
Start: 2022-04-25

## 2022-04-25 RX ORDER — FENTANYL CITRATE 50 UG/ML
INJECTION, SOLUTION INTRAMUSCULAR; INTRAVENOUS PRN
Status: COMPLETED | OUTPATIENT
Start: 2022-04-25 | End: 2022-04-25

## 2022-04-25 RX ORDER — FLUMAZENIL 0.1 MG/ML
0.2 INJECTION, SOLUTION INTRAVENOUS
Status: CANCELLED | OUTPATIENT
Start: 2022-04-25 | End: 2022-04-25

## 2022-04-25 RX ORDER — IBUPROFEN 800 MG/1
800 TABLET, FILM COATED ORAL EVERY 8 HOURS PRN
Qty: 90 TABLET | Refills: 0 | Status: SHIPPED | OUTPATIENT
Start: 2022-04-25 | End: 2022-07-09

## 2022-04-25 RX ORDER — NALOXONE HYDROCHLORIDE 0.4 MG/ML
0.2 INJECTION, SOLUTION INTRAMUSCULAR; INTRAVENOUS; SUBCUTANEOUS
Status: CANCELLED | OUTPATIENT
Start: 2022-04-25

## 2022-04-25 RX ORDER — ONDANSETRON 2 MG/ML
4 INJECTION INTRAMUSCULAR; INTRAVENOUS EVERY 6 HOURS PRN
Status: CANCELLED | OUTPATIENT
Start: 2022-04-25

## 2022-04-25 RX ORDER — ONDANSETRON 4 MG/1
4 TABLET, ORALLY DISINTEGRATING ORAL EVERY 6 HOURS PRN
Status: CANCELLED | OUTPATIENT
Start: 2022-04-25

## 2022-04-25 RX ORDER — ONDANSETRON 2 MG/ML
4 INJECTION INTRAMUSCULAR; INTRAVENOUS
Status: DISCONTINUED | OUTPATIENT
Start: 2022-04-25 | End: 2022-04-25 | Stop reason: HOSPADM

## 2022-04-25 RX ORDER — LIDOCAINE 40 MG/G
CREAM TOPICAL
Status: DISCONTINUED | OUTPATIENT
Start: 2022-04-25 | End: 2022-04-25 | Stop reason: HOSPADM

## 2022-04-25 RX ORDER — PROCHLORPERAZINE MALEATE 10 MG
10 TABLET ORAL EVERY 6 HOURS PRN
Status: CANCELLED | OUTPATIENT
Start: 2022-04-25

## 2022-04-25 RX ADMIN — FENTANYL CITRATE 50 MCG: 50 INJECTION, SOLUTION INTRAMUSCULAR; INTRAVENOUS at 09:16

## 2022-04-25 RX ADMIN — FENTANYL CITRATE 50 MCG: 50 INJECTION, SOLUTION INTRAMUSCULAR; INTRAVENOUS at 09:26

## 2022-04-25 RX ADMIN — MIDAZOLAM 1 MG: 1 INJECTION INTRAMUSCULAR; INTRAVENOUS at 09:15

## 2022-04-25 RX ADMIN — FENTANYL CITRATE 100 MCG: 50 INJECTION, SOLUTION INTRAMUSCULAR; INTRAVENOUS at 09:10

## 2022-04-25 RX ADMIN — MIDAZOLAM 1 MG: 1 INJECTION INTRAMUSCULAR; INTRAVENOUS at 09:10

## 2022-04-25 RX ADMIN — MIDAZOLAM 1 MG: 1 INJECTION INTRAMUSCULAR; INTRAVENOUS at 09:26

## 2022-04-25 NOTE — H&P
Hattie Mosher  [id number]  female  61 year old      Reason for procedure/surgery: colonoscopy for screening    Patient Active Problem List   Diagnosis     Generalized convulsive epilepsy (H)     NOELLE (obstructive sleep apnea)     Perforation of tympanic membrane     Epilepsy (H)     Hypertrophic and atrophic condition of skin     Hyponatremia     Essential hypertension     Low back pain     SIADH (syndrome of inappropriate ADH production): probable med-induced     Lumbago       Past Surgical History:    Past Surgical History:   Procedure Laterality Date      SECTION      3x     COLONOSCOPY  2011    Procedure:COMBINED COLONOSCOPY, REMOVE TUMOR/POLYP/LESION BY SNARE; Surgeon:PAULA BEAN; Location: [facility]     ENDOSCOPIC RELEASE CARPAL TUNNEL  2013    Procedure: ENDOSCOPIC RELEASE CARPAL TUNNEL;  Left Endoscopic Carpal Tunnel Release;  Surgeon: Kate Najera MD;  Location:  OR     ENDOSCOPIC RELEASE CARPAL TUNNEL  2013    Procedure: ENDOSCOPIC RELEASE CARPAL TUNNEL;  Right Endoscopic Carpal Tunnel Release  ;  Surgeon: Kate Najera MD;  [facility]     HYSTERECTOMY      Ovaries intact.  No cancer.        Past Medical History:   Past Medical History:   Diagnosis Date     Apnea, sleep 2007    reeval  Mild NOELLE     Essential hypertension 2013     Migraine headaches      Seizure disorder, complex partial (H)      Seizures (H)        Social History:   Social History     Tobacco Use     Smoking status: Never Smoker     Smokeless tobacco: Never Used   Substance Use Topics     Alcohol use: Yes     Alcohol/week: 2.0 standard drinks     Types: 2 Standard drinks or equivalent per week     Comment: 1-2 drinks/week       Family History:   Family History   Problem Relation Age of Onset     Neurologic Disorder Mother         MS     Cardiovascular Father         Aneurysm     Glaucoma Brother      Retinal detachment No family hx of      Macular Degeneration No  family hx of        Allergies:   Allergies   Allergen Reactions     Nkda [No Known Drug Allergies]      Seasonal Allergies        Active Medications:   No current outpatient medications on file.       Systemic Review:   CONSTITUTIONAL: NEGATIVE for fever, chills, change in weight  ENT/MOUTH: NEGATIVE for ear, mouth and throat problems  RESP: NEGATIVE for significant cough or SOB  CV: NEGATIVE for chest pain, palpitations or peripheral edema    Physical Examination:   Vital Signs: BP (!) 142/82   Temp 98.7  F (37.1  C) (Oral)   Resp 14   Ht 1.524 m (5')   Wt 73.7 kg (162 lb 7.7 oz)   SpO2 96%   BMI 31.73 kg/m    GENERAL: healthy, alert and no distress  RESP: lungs clear to auscultation - no rales, rhonchi or wheezes  CV: regular rate and rhythm, normal S1 S2, no S3 or S4, no murmur, click or rub, no peripheral edema and peripheral pulses strong  ABDOMEN: soft, nontender and bowel sounds normal  MS: no gross musculoskeletal defects noted, no edema    Plan: Appropriate to proceed as scheduled.      Thomas M. Leventhal, MD  4/25/2022    PCP:  Yg Sharp

## 2022-04-25 NOTE — OR NURSING
Pt underwent colonoscopy under conscious sedation. No interventions or specimens collected. Pt transferred to recovery and report given to 3C RN.       Linda Collins RN

## 2022-04-25 NOTE — TELEPHONE ENCOUNTER
ibuprofen (ADVIL/MOTRIN) 800 MG tablet      Last Written Prescription Date:  8-16-21  Last Fill Quantity: 90,   # refills: 0  Last Office Visit : 3-25-22  Future Office visit:  none      BP Readings from Last 3 Encounters:   04/25/22 (!) 142/82   03/25/22 (!) 146/84   12/16/21 127/83     Routing refill request to provider for review/approval because:  Blood pressure out of range

## 2022-07-05 DIAGNOSIS — R51.9 HEADACHE, UNSPECIFIED HEADACHE TYPE: ICD-10-CM

## 2022-07-09 RX ORDER — IBUPROFEN 800 MG/1
800 TABLET, FILM COATED ORAL EVERY 8 HOURS PRN
Qty: 90 TABLET | Refills: 1 | Status: SHIPPED | OUTPATIENT
Start: 2022-07-09 | End: 2022-08-02

## 2022-07-09 NOTE — TELEPHONE ENCOUNTER
ibuprofen (ADVIL/MOTRIN) 800 MG tablet    3/25/2022  [facility] [place]     Yg Sharp MD    Family Medicine      High med alert  Warnings Override History for ibuprofen (ADVIL/MOTRIN) 800 MG tablet [[id number]]    Overridden by Jocelyn Richter RN on Apr 25, 2022 9:16 AM   Drug-Drug   1. IBUPROFEN / SELECTIVE SEROTONIN REUPTAKE INHIBITORS [Level: Major] [Reason: Benefit outweighs risk]   Other Orders: sertraline (ZOLOFT) 100 MG tablet

## 2022-07-20 ENCOUNTER — OFFICE VISIT (OUTPATIENT)
Dept: NEUROLOGY | Facility: CLINIC | Age: 62
End: 2022-07-20
Payer: COMMERCIAL

## 2022-07-20 VITALS
BODY MASS INDEX: 32.61 KG/M2 | OXYGEN SATURATION: 95 % | RESPIRATION RATE: 16 BRPM | WEIGHT: 167 LBS | HEART RATE: 80 BPM | DIASTOLIC BLOOD PRESSURE: 83 MMHG | SYSTOLIC BLOOD PRESSURE: 150 MMHG

## 2022-07-20 DIAGNOSIS — G40.909 RECURRENT SEIZURES (H): Primary | ICD-10-CM

## 2022-07-20 PROCEDURE — 99215 OFFICE O/P EST HI 40 MIN: CPT | Performed by: PSYCHIATRY & NEUROLOGY

## 2022-07-20 ASSESSMENT — PAIN SCALES - GENERAL: PAINLEVEL: MILD PAIN (3)

## 2022-07-20 NOTE — PROGRESS NOTES
"  Chief Complaint:   Seizures     History of Present Illness:   60 yo right handed woman with PMH significant for complex partial epilepsy, NOELLE, and migraine headache.  She was admitted for vEEG monitoring for epilepsy surgery workup in 2011.  She iwas followed by Dr. Freitas for many years.  Currently on keppra 1250 mg BID, Lamictal 75 mg bid, no side effects were reported.     Patient first started to have seizures when she was in her 30's.    She has 3 types of seizures. Type 1 is auras, which consisting of confusion, lightheadedness, occasional markie vu.  No strange smells or vision changes.  Now is almost once a day.   Type 2 is likely focal impaired seizures. \"Absence seizures\". They seizures consist of sudden loss of awareness without loss of consciousness.  She may get an aura consisting of confusion, lightheadedness, occasional markie vu before that.  The spell will last 1-3 minutes followed by confusion, lethargy, headache, followed by post-ictal fatigue. It may take her 1-2 days before she is back to her baseline.   She has no memory of the spells.  She has been told that she will appear confused, but will continue to talk, look around, and be able to move her arms/legs, etc as if she were completely aware.  No incontinence and oral trauma.  Once a week according to the daughter.  Type 3 is likely GTCs. She only had twice in her life.    Patient is having a lot of memory issues per daughter.    Triggers: Stess.    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  No head injury with LOC.  No other CNS infections.  No strokes.  No developmental delay.  Chronically sleep deprived from untreated NOELLE.  Multiple servings of caffeine per day.        Current Outpatient Medications   Medication Sig Dispense Refill     amLODIPine (NORVASC) 5 MG tablet Take 1 tablet (5 mg) by mouth daily 90 tablet 3     atorvastatin (LIPITOR) 40 MG tablet Take 1 tablet (40 mg) by mouth daily 90 tablet 3     " cetirizine (ZYRTEC) 10 MG tablet Take 10 mg by mouth every evening.       esomeprazole (NEXIUM) 40 MG DR capsule Take 40 mg by mouth       fexofenadine-pseudoePHEDrine (ALLEGRA-D)  MG 12 hr tablet Take 1 tablet by mouth every morning.       ibuprofen (ADVIL/MOTRIN) 800 MG tablet Take 1 tablet (800 mg) by mouth every 8 hours as needed for moderate pain 90 tablet 1     lamoTRIgine (LAMICTAL) 25 MG tablet TAKE 3 TABLETS BY MOUTH TWICE DAILY 180 tablet 5     levETIRAcetam (KEPPRA) 500 MG tablet TAKE 2 AND 1/2 TABLET BY MOUTH TWICE DAILY 150 tablet 5     magnesium oxide (MAG-OX) 400 MG tablet Take 1 tablet (400 mg) by mouth daily 90 tablet 1     mometasone (NASONEX) 50 MCG/ACT nasal spray Spray 2 sprays into both nostrils daily as needed        omeprazole (PRILOSEC) 40 MG DR capsule Take 1 capsule (40 mg) by mouth daily 90 capsule 3     sertraline (ZOLOFT) 100 MG tablet Take 1 tablet (100 mg) by mouth daily 90 tablet 3     PAST AEDs:   Trileptal caused side effects.      Past Medical History:   Diagnosis Date     Apnea, sleep 2007    reeval  Mild NOELLE     Essential hypertension 2013     Migraine headaches      Seizure disorder, complex partial (H)      Seizures (H)        Past Surgical History:   Procedure Laterality Date      SECTION      3x     COLONOSCOPY  2011    Procedure:COMBINED COLONOSCOPY, REMOVE TUMOR/POLYP/LESION BY SNARE; Surgeon:PAULA BEAN; Location: GI     COLONOSCOPY N/A 2022    Procedure: COLONOSCOPY;  Surgeon: Leventhal, Thomas Michael, MD;  Location:  GI     ENDOSCOPIC RELEASE CARPAL TUNNEL  2013    Procedure: ENDOSCOPIC RELEASE CARPAL TUNNEL;  Left Endoscopic Carpal Tunnel Release;  Surgeon: Kate Najera MD;  [facility]     ENDOSCOPIC RELEASE CARPAL TUNNEL  2013    Procedure: ENDOSCOPIC RELEASE CARPAL TUNNEL;  Right Endoscopic Carpal Tunnel Release  ;  Surgeon: Kate Najera MD;  [facility]      HYSTERECTOMY      Ovaries intact.  No cancer.         Social History:         History   Substance Use Topics     Smoking status: Never Smoker      Smokeless tobacco: Not on file     Alcohol Use: 1.0 oz/week       2 drink(s) per week   --Works at [employer] for past 32 years.  , has 3 children.    --Drinks multiple servings of coffee per day.                Family History:    Family History            Family History   Problem Relation Age of Onset     Neurological Mother         MS      Cardiovascular Father         Aneurysm                 Allergies:    NKDA.                  Medications:      Prescriptions Prior to Admission             Prescriptions prior to admission   Medication Sig Dispense Refill     Spacer/Aero-Holding Chambers (AEROCHAMBER MV) MISC Use with albuterol inhaler         fexofenadine-pseudoephedrine (ALLEGRA-D 12 HOUR)  MG per tablet Take 1 tablet by mouth daily.         levetiracetam (KEPPRA) 500 MG tablet Take 500 mg by mouth 2 times daily.         atorvastatin (LIPITOR) 10 MG tablet Take 10 mg by mouth daily.         magnesium oxide (MAG-OXIDE) 400 MG tablet Take 1 tablet by mouth daily. Will need to see primary provider for further refills         Multiple Vitamin (MULTI-VITAMIN) per tablet Take 1 tablet by mouth daily.         mometasone (NASONEX) 50 MCG/ACT nasal spray 2 sprays by Both Nostrils route daily.         esomeprazole (NEXIUM) 40 MG capsule Take 40 mg by mouth daily. Patient needs to see primary care provider         oxcarbazepine (TRILEPTAL) 300 MG tablet Take 1 1/2 tablets (450mg) AM and 2 tablets (600mg) PM         sertraline (ZOLOFT) 100 MG tablet Take 100 mg by mouth daily.         cetirizine (ZYRTEC) 10 MG tablet Take 10 mg by mouth daily.                Review of Systems:     Negative.           Physical Exam:        Blood pressure (!) 150/83, pulse 80, resp. rate 16, weight 75.8 kg (167 lb), SpO2 95 %, not currently breastfeeding.    General exam: General  Appearance:  No acute distress.  HEENT:  Normocephalic, atraumatic.  Neck:  Supple, no lymphadenopathy. Extremities:  No edema, no clubbing, no cyanosis.      Neurologic Exam:  Alert and oriented x3.  Speech fluent, appropriate. Normal attention.  Cranial Nerves:  Pupils are equal, round, reactive to light and accomodation.  Extraocular movement intact.  No facial weakness or asymmetry.  Facial sensation was normal.  Tongue and palate midline.  Hearing normal.  Visual field : normal to confrontation.   Motor Exam:  Normal bulk.  Normal tone.  Strength 5/5 in all extremities.  Sensory:  Normal to light touch and vibration in all extremities.  Deep tendon reflexes 2+ bilaterally in both upper and lower extremities.  Coordination: Finger-to-nose, heel-to-shin exams showed no ataxia.  Rapid alternating movement was normal.  Gait and Station:  normal casual gait.  Normal tendem gait. No difficulties with tip-toe or heel walking.  Romberg sign negative.    Investigations:   SUMMARY OF 8 DAYS OF VIDEO-EEG MONITORING (5/2008): The interictal recording was abnormal by virtue of generalized delta-theta slowing, often with bursts of high- amplitude delta slowing and drowsiness, with occasional left temporal polymorphic delta transients and left temporal sharp waves, and with rare right temporal polymorphic delta activities and right temporal sharp waves. No electrographic seizures were recorded during this monitoring procedure. No spells with confusion or other distinct behavioral alterations were recorded during this monitoring procedure. The bitemporal and generalized slowing indicates bitemporal and generalized cerebral dysfunction, which is etiologically nonspecific. This video-EEG monitoring procedure is consistent with the interictal state of partial epilepsy, although no seizures were recorded. Clinical correlation is recommended.      SUMMARY OF 7 DAYS OF VIDEO-EEG MONITORING:  (4/2011)  The interictal EEG was abnormal  due to focal independent and synchronous bitemporal delta slowing that often predominated on the left, to bursts of generalized delta-theta slowing, and to bitemporal independent spikes and sharp waves.  The interictal epileptiform abnormalities were much more frequent after the patient was tapered off antiepileptic medications, and the interictal epileptiform abnormalities declined in occurrence on re-introduction of anti-seizure medications.  The patient had a total of 29 electrographic seizures during her 7 days of monitoring, among which 8 events were definite electroclinical seizures that reportedly represented her habitual events, with the other 21 events not clearly associated with behavioral changes; many of the latter occurred when the patient was alone, without behavioral interaction during these events.  These 29 seizures occurred during Days 3, 4, and 5, when the patient was tapered off anti-seizure medications, and the frequent seizures stopped with re-introduction of anti-seizure medications.  The most common ictal discharge localization that occurred without significant artifactual obscuration was right temporal initial maximum followed by evolution to a sustained left temporal maximum of ictal discharges.  Other seizure patterns included left temporal initial maximum which was sustained, non-localizing ictal onsets with subsequent maxima on either or both sides, and obscured ictal onsets on either or both sides.  The patient also had subjective events, usually described as  hot flashes , none of which was associated with alteration of EEG activities from baseline.    The data in this monitoring procedure are consistent with temporal lobe epilepsy.  Clinical correlation is recommended.    Brain MRI (11/06):  Impression: Very mild prominence of the right temporal horn.  Symmetrical volume and signal intensity of the hippocampi.       Neuropsych testing (3/2011):  There is no compelling evidence of  intellectual decline from 7/08.  Auditory attention and word knowledge/expressive communicability are relative weaknesses, in the below average range. Graphomotor learning, verbal reasoning, and sequential analysis are low average.  Visuospatial processing/constructional abilities are a relative strength, in the above average to superior range.  Once again there is a pattern of poor recent (long-term) memory under verbal and nonverbal conditions.  On this occasion she actually had somewhat better (normal) long-term retention of a word list, but very poor long-term retention of lengthy story passages.  Her drawing of a complex figure is poorer on this occasion, in the impaired range, due to proportional and placement errors.  Long-term retention of simpler and more complex figural material is clearly in the impaired range on this occasion.  Recognition memory for figural material is inconsistent, ranging from mildly impaired to intact.  There has been some decline in nonverbal planning (maze solving), previously above average to superior, now low average.  Inductive reasoning on the other hand has improved, though she continues to show perseverative tendencies.  Set shifting, verbal associative fluency, and nonverbal associative fluency are essentially unchanged.  She continues to show poor speeded word retrieval.  Confrontation naming remains in the mildly impaired range, without much change from the last testing.  Finger tapping speeds and fine motor dexterity are slightly faster on this occasion, and now are in the normal range.       Taken as a whole, the findings do not provide clear, consistent evidence of cognitive decline.  The findings continue to suggest multifocal cerebral dysfunction, with the dominant (presumably left) hemisphere more dysfunctional than the nondominant (right) hemisphere.  Mesial temporal brain regions bilaterally are dysfunctional.  That is consistent with a history of epilepsy,  especially complex-partial epilepsy.                                                                                                             Assessment and Plan:     1. Focal epilepsy.  60 yo right handed F with complex partial epilepsy.  On keppra 1250 mg BID, and Lamictal 75 mg bid.  Cotinue to have frequent seizures, auras daily, focal impaired seizures once a week.    2. Memory loss. Neuropsych evaluation.     Plan:  1. Continue Keppra 1250 mg bid and Lamictal 75 mg bid.  2. Labs: Keppra and Lamictal  3. EEG for 3 hours.  4. RTC in 2 months.  5. Neuropsych evaluation.  6. OT evaluation.      56 min total time was spent on the day of this visit.      38 min was spent on face to face time  6 min was spent on preparation of visit to review charts and labs, ordering medications and tests  12 min was spent on documentation of clinical information

## 2022-07-20 NOTE — PATIENT INSTRUCTIONS
Times of Days am pm        Medication Tablet Size Number of Tablets/Capsules Total Daily Dosage    Keppra 500 2.5 2.5       2500 mg    Lamictal 25 3 3       150 mg                                                                                                                 Carry this with you at all times.  CONTINUE TAKING YOUR OTHER MEDICATIONS AS PREVIOUSLY DIRECTED.      * * *Do not store medications in the bathroom.  Keep medications away from children!* * *

## 2022-07-20 NOTE — LETTER
"7/20/2022       RE: Hattie Mosher  [address] [facility] [address]     Dear Colleague,    Thank you for referring your patient, Hattie Mosher, to the [facility] [place] [facility]. Please see a copy of my visit note below.      Chief Complaint:   Seizures     History of Present Illness:   60 yo right handed woman with PMH significant for complex partial epilepsy, NOELLE, and migraine headache.  She was admitted for vEEG monitoring for epilepsy surgery workup in 2011.  She iwas followed by Dr. Freitas for many years.  Currently on keppra 1250 mg BID, Lamictal 75 mg bid, no side effects were reported.     Patient first started to have seizures when she was in her 30's.    She has 3 types of seizures. Type 1 is auras, which consisting of confusion, lightheadedness, occasional markie vu.  No strange smells or vision changes.  Now is almost once a day.   Type 2 is likely focal impaired seizures. \"Absence seizures\". They seizures consist of sudden loss of awareness without loss of consciousness.  She may get an aura consisting of confusion, lightheadedness, occasional markie vu before that.  The spell will last 1-3 minutes followed by confusion, lethargy, headache, followed by post-ictal fatigue. It may take her 1-2 days before she is back to her baseline.   She has no memory of the spells.  She has been told that she will appear confused, but will continue to talk, look around, and be able to move her arms/legs, etc as if she were completely aware.  No incontinence and oral trauma.  Once a week according to the daughter.  Type 3 is likely GTCs. She only had twice in her life.    Patient is having a lot of memory issues per daughter.    Triggers: Stess.    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  No head injury with LOC.  No other CNS infections.  No strokes.  No developmental delay.  Chronically sleep deprived " from untreated NOELLE.  Multiple servings of caffeine per day.        Current Outpatient Medications   Medication Sig Dispense Refill     amLODIPine (NORVASC) 5 MG tablet Take 1 tablet (5 mg) by mouth daily 90 tablet 3     atorvastatin (LIPITOR) 40 MG tablet Take 1 tablet (40 mg) by mouth daily 90 tablet 3     cetirizine (ZYRTEC) 10 MG tablet Take 10 mg by mouth every evening.       esomeprazole (NEXIUM) 40 MG DR capsule Take 40 mg by mouth       fexofenadine-pseudoePHEDrine (ALLEGRA-D)  MG 12 hr tablet Take 1 tablet by mouth every morning.       ibuprofen (ADVIL/MOTRIN) 800 MG tablet Take 1 tablet (800 mg) by mouth every 8 hours as needed for moderate pain 90 tablet 1     lamoTRIgine (LAMICTAL) 25 MG tablet TAKE 3 TABLETS BY MOUTH TWICE DAILY 180 tablet 5     levETIRAcetam (KEPPRA) 500 MG tablet TAKE 2 AND 1/2 TABLET BY MOUTH TWICE DAILY 150 tablet 5     magnesium oxide (MAG-OX) 400 MG tablet Take 1 tablet (400 mg) by mouth daily 90 tablet 1     mometasone (NASONEX) 50 MCG/ACT nasal spray Spray 2 sprays into both nostrils daily as needed        omeprazole (PRILOSEC) 40 MG DR capsule Take 1 capsule (40 mg) by mouth daily 90 capsule 3     sertraline (ZOLOFT) 100 MG tablet Take 1 tablet (100 mg) by mouth daily 90 tablet 3     PAST AEDs:   Trileptal caused side effects.      Past Medical History:   Diagnosis Date     Apnea, sleep 2007    reeval  Mild NOELLE     Essential hypertension 2013     Migraine headaches      Seizure disorder, complex partial (H)      Seizures (H)        Past Surgical History:   Procedure Laterality Date      SECTION      3x     COLONOSCOPY  2011    Procedure:COMBINED COLONOSCOPY, REMOVE TUMOR/POLYP/LESION BY SNARE; Surgeon:PAULA BEAN; Location: GI     COLONOSCOPY N/A 2022    Procedure: COLONOSCOPY;  Surgeon: Leventhal, Thomas Michael, MD;  Location:  GI     ENDOSCOPIC RELEASE CARPAL TUNNEL  2013    Procedure: ENDOSCOPIC RELEASE CARPAL TUNNEL;   Left Endoscopic Carpal Tunnel Release;  Surgeon: Kate Najera MD;  [facility]     ENDOSCOPIC RELEASE CARPAL TUNNEL  8/22/2013    Procedure: ENDOSCOPIC RELEASE CARPAL TUNNEL;  Right Endoscopic Carpal Tunnel Release  ;  Surgeon: Kate Najera MD;  [facility]: [facility]     HYSTERECTOMY      Ovaries intact.  No cancer.         Social History:         History   Substance Use Topics     Smoking status: Never Smoker      Smokeless tobacco: Not on file     Alcohol Use: 1.0 oz/week       2 drink(s) per week   --Works at [employer] for past 32 years.  , has 3 children.    --Drinks multiple servings of coffee per day.                Family History:    Family History            Family History   Problem Relation Age of Onset     Neurological Mother         MS      Cardiovascular Father         Aneurysm                 Allergies:    NKDA.                  Medications:      Prescriptions Prior to Admission             Prescriptions prior to admission   Medication Sig Dispense Refill     Spacer/Aero-Holding Chambers (AEROCHAMBER MV) MISC Use with albuterol inhaler         fexofenadine-pseudoephedrine (ALLEGRA-D 12 HOUR)  MG per tablet Take 1 tablet by mouth daily.         levetiracetam (KEPPRA) 500 MG tablet Take 500 mg by mouth 2 times daily.         atorvastatin (LIPITOR) 10 MG tablet Take 10 mg by mouth daily.         magnesium oxide (MAG-OXIDE) 400 MG tablet Take 1 tablet by mouth daily. Will need to see primary provider for further refills         Multiple Vitamin (MULTI-VITAMIN) per tablet Take 1 tablet by mouth daily.         mometasone (NASONEX) 50 MCG/ACT nasal spray 2 sprays by Both Nostrils route daily.         esomeprazole (NEXIUM) 40 MG capsule Take 40 mg by mouth daily. Patient needs to see primary care provider         oxcarbazepine (TRILEPTAL) 300 MG tablet Take 1 1/2 tablets (450mg) AM and 2 tablets (600mg) PM         sertraline (ZOLOFT) 100 MG tablet Take 100 mg by mouth  daily.         cetirizine (ZYRTEC) 10 MG tablet Take 10 mg by mouth daily.                Review of Systems:     Negative.           Physical Exam:        Blood pressure (!) 150/83, pulse 80, resp. rate 16, weight 75.8 kg (167 lb), SpO2 95 %, not currently breastfeeding.    General exam: General Appearance:  No acute distress.  HEENT:  Normocephalic, atraumatic.  Neck:  Supple, no lymphadenopathy. Extremities:  No edema, no clubbing, no cyanosis.      Neurologic Exam:  Alert and oriented x3.  Speech fluent, appropriate. Normal attention.  Cranial Nerves:  Pupils are equal, round, reactive to light and accomodation.  Extraocular movement intact.  No facial weakness or asymmetry.  Facial sensation was normal.  Tongue and palate midline.  Hearing normal.  Visual field : normal to confrontation.   Motor Exam:  Normal bulk.  Normal tone.  Strength 5/5 in all extremities.  Sensory:  Normal to light touch and vibration in all extremities.  Deep tendon reflexes 2+ bilaterally in both upper and lower extremities.  Coordination: Finger-to-nose, heel-to-shin exams showed no ataxia.  Rapid alternating movement was normal.  Gait and Station:  normal casual gait.  Normal tendem gait. No difficulties with tip-toe or heel walking.  Romberg sign negative.    Investigations:   SUMMARY OF 8 DAYS OF VIDEO-EEG MONITORING (5/2008): The interictal recording was abnormal by virtue of generalized delta-theta slowing, often with bursts of high- amplitude delta slowing and drowsiness, with occasional left temporal polymorphic delta transients and left temporal sharp waves, and with rare right temporal polymorphic delta activities and right temporal sharp waves. No electrographic seizures were recorded during this monitoring procedure. No spells with confusion or other distinct behavioral alterations were recorded during this monitoring procedure. The bitemporal and generalized slowing indicates bitemporal and generalized cerebral dysfunction,  which is etiologically nonspecific. This video-EEG monitoring procedure is consistent with the interictal state of partial epilepsy, although no seizures were recorded. Clinical correlation is recommended.      SUMMARY OF 7 DAYS OF VIDEO-EEG MONITORING:  (4/2011)  The interictal EEG was abnormal due to focal independent and synchronous bitemporal delta slowing that often predominated on the left, to bursts of generalized delta-theta slowing, and to bitemporal independent spikes and sharp waves.  The interictal epileptiform abnormalities were much more frequent after the patient was tapered off antiepileptic medications, and the interictal epileptiform abnormalities declined in occurrence on re-introduction of anti-seizure medications.  The patient had a total of 29 electrographic seizures during her 7 days of monitoring, among which 8 events were definite electroclinical seizures that reportedly represented her habitual events, with the other 21 events not clearly associated with behavioral changes; many of the latter occurred when the patient was alone, without behavioral interaction during these events.  These 29 seizures occurred during Days 3, 4, and 5, when the patient was tapered off anti-seizure medications, and the frequent seizures stopped with re-introduction of anti-seizure medications.  The most common ictal discharge localization that occurred without significant artifactual obscuration was right temporal initial maximum followed by evolution to a sustained left temporal maximum of ictal discharges.  Other seizure patterns included left temporal initial maximum which was sustained, non-localizing ictal onsets with subsequent maxima on either or both sides, and obscured ictal onsets on either or both sides.  The patient also had subjective events, usually described as  hot flashes , none of which was associated with alteration of EEG activities from baseline.    The data in this monitoring procedure are  consistent with temporal lobe epilepsy.  Clinical correlation is recommended.    Brain MRI (11/06):  Impression: Very mild prominence of the right temporal horn.  Symmetrical volume and signal intensity of the hippocampi.       Neuropsych testing (3/2011):  There is no compelling evidence of intellectual decline from 7/08.  Auditory attention and word knowledge/expressive communicability are relative weaknesses, in the below average range. Graphomotor learning, verbal reasoning, and sequential analysis are low average.  Visuospatial processing/constructional abilities are a relative strength, in the above average to superior range.  Once again there is a pattern of poor recent (long-term) memory under verbal and nonverbal conditions.  On this occasion she actually had somewhat better (normal) long-term retention of a word list, but very poor long-term retention of lengthy story passages.  Her drawing of a complex figure is poorer on this occasion, in the impaired range, due to proportional and placement errors.  Long-term retention of simpler and more complex figural material is clearly in the impaired range on this occasion.  Recognition memory for figural material is inconsistent, ranging from mildly impaired to intact.  There has been some decline in nonverbal planning (maze solving), previously above average to superior, now low average.  Inductive reasoning on the other hand has improved, though she continues to show perseverative tendencies.  Set shifting, verbal associative fluency, and nonverbal associative fluency are essentially unchanged.  She continues to show poor speeded word retrieval.  Confrontation naming remains in the mildly impaired range, without much change from the last testing.  Finger tapping speeds and fine motor dexterity are slightly faster on this occasion, and now are in the normal range.       Taken as a whole, the findings do not provide clear, consistent evidence of cognitive  decline.  The findings continue to suggest multifocal cerebral dysfunction, with the dominant (presumably left) hemisphere more dysfunctional than the nondominant (right) hemisphere.  Mesial temporal brain regions bilaterally are dysfunctional.  That is consistent with a history of epilepsy, especially complex-partial epilepsy.                                                                                                             Assessment and Plan:     1. Focal epilepsy.  60 yo right handed F with complex partial epilepsy.  On keppra 1250 mg BID, and Lamictal 75 mg bid.  Cotinue to have frequent seizures, auras daily, focal impaired seizures once a week.    2. Memory loss. Neuropsych evaluation.     Plan:  1. Continue Keppra 1250 mg bid and Lamictal 75 mg bid.  2. Labs: Keppra and Lamictal  3. EEG for 3 hours.  4. RTC in 2 months.  5. Neuropsych evaluation.  6. OT evaluation.      56 min total time was spent on the day of this visit.      38 min was spent on face to face time  6 min was spent on preparation of visit to review charts and labs, ordering medications and tests  12 min was spent on documentation of clinical information      Sincerely,    Nicola Ohara MD

## 2022-07-30 DIAGNOSIS — R51.9 HEADACHE, UNSPECIFIED HEADACHE TYPE: ICD-10-CM

## 2022-08-02 RX ORDER — IBUPROFEN 800 MG/1
800 TABLET, FILM COATED ORAL EVERY 8 HOURS PRN
Qty: 90 TABLET | Refills: 0 | Status: SHIPPED | OUTPATIENT
Start: 2022-08-02 | End: 2022-09-15

## 2022-08-02 NOTE — TELEPHONE ENCOUNTER
ibuprofen (ADVIL/MOTRIN) 800 MG tablet    Last Written Prescription Date:  7/9/22  Last Fill Quantity: 90,   # refills: 1  Last Office Visit : 3/25/22  Future Office visit: none    High med alert    Warnings Override History for ibuprofen (ADVIL/MOTRIN) 800 MG tablet [[id number]]    Overridden by Jocelyn Santo RN on Jul 9, 2022 10:31 AM   Drug-Drug   1. IBUPROFEN / SELECTIVE SEROTONIN REUPTAKE INHIBITORS [Level: Major] [Reason: Tolerated medication/side effects in past]   Other Orders: sertraline (ZOLOFT) 100 MG tablet          Routing refill request to provider for review/approval because:bp > 140/90

## 2022-08-29 DIAGNOSIS — G40.219 PARTIAL SYMPTOMATIC EPILEPSY WITH COMPLEX PARTIAL SEIZURES, INTRACTABLE, WITHOUT STATUS EPILEPTICUS (H): ICD-10-CM

## 2022-08-29 DIAGNOSIS — G40.309 GENERALIZED CONVULSIVE EPILEPSY (H): ICD-10-CM

## 2022-09-02 RX ORDER — LAMOTRIGINE 25 MG/1
TABLET ORAL
Qty: 180 TABLET | Refills: 5 | Status: SHIPPED | OUTPATIENT
Start: 2022-09-02 | End: 2023-03-01

## 2022-09-02 RX ORDER — LEVETIRACETAM 500 MG/1
TABLET ORAL
Qty: 150 TABLET | Refills: 5 | Status: SHIPPED | OUTPATIENT
Start: 2022-09-02 | End: 2023-03-01

## 2022-09-02 NOTE — TELEPHONE ENCOUNTER
LEVETIRACETAM 500MG TABLETS  Last Written Prescription Date:   3/3/2022  Last Fill Quantity: 125,   # refills: 5  Last Office Visit : 7/20/2022  Future Office visit:  10/29/2022  150 Tabs, 5 Refills sent to pharm       LAMOTRIGINE 25MG TABLETS  Last Written Prescription Date:   3/3/2022  Last Fill Quantity:  180   # refills: 5  Last Office Visit : 7/20/2022  Future Office visit:  10/29/2022  180 Tabs, 5 Refills sent to pharm     Brynn Shukla RN  Central Triage Red Flags/Med Refills

## 2022-09-12 DIAGNOSIS — R51.9 HEADACHE, UNSPECIFIED HEADACHE TYPE: ICD-10-CM

## 2022-09-15 ENCOUNTER — ANCILLARY PROCEDURE (OUTPATIENT)
Dept: NEUROLOGY | Facility: CLINIC | Age: 62
End: 2022-09-15
Attending: PSYCHIATRY & NEUROLOGY
Payer: COMMERCIAL

## 2022-09-15 PROCEDURE — 95700 EEG CONT REC W/VID EEG TECH: CPT | Performed by: PSYCHIATRY & NEUROLOGY

## 2022-09-15 PROCEDURE — 95713 VEEG 2-12 HR CONT MNTR: CPT | Performed by: PSYCHIATRY & NEUROLOGY

## 2022-09-15 PROCEDURE — 95718 EEG PHYS/QHP 2-12 HR W/VEEG: CPT | Performed by: PSYCHIATRY & NEUROLOGY

## 2022-09-15 NOTE — TELEPHONE ENCOUNTER
Ibuprofen 800 MG Oral Tablet  Last Written Prescription Date:   8/2/2022  Last Fill Quantity: 90,   # refills: 0  Last Office Visit :  3/25/2022  Future Office visit: None    Routing refill request to provider for review/approval because:  Abnormal B/P   Change B/P med? Change dose?  Add med?  Follow up B/P check for Pt care?     BP Readings from Last 3 Encounters:   07/20/22 (!) 150/83   04/25/22 122/65   03/25/22 (!) 146/84        Brynn Shukla RN  Central Triage Red Flags/Med Refills

## 2022-09-19 ENCOUNTER — MYC MEDICAL ADVICE (OUTPATIENT)
Dept: CARDIOLOGY | Facility: CLINIC | Age: 62
End: 2022-09-19

## 2022-09-20 RX ORDER — IBUPROFEN 800 MG/1
800 TABLET, FILM COATED ORAL EVERY 8 HOURS PRN
Qty: 90 TABLET | Refills: 1 | Status: SHIPPED | OUTPATIENT
Start: 2022-09-20 | End: 2022-10-26

## 2022-10-16 ENCOUNTER — MYC MEDICAL ADVICE (OUTPATIENT)
Dept: CARDIOLOGY | Facility: CLINIC | Age: 62
End: 2022-10-16

## 2022-10-23 ENCOUNTER — HEALTH MAINTENANCE LETTER (OUTPATIENT)
Age: 62
End: 2022-10-23

## 2022-10-23 DIAGNOSIS — R51.9 HEADACHE, UNSPECIFIED HEADACHE TYPE: ICD-10-CM

## 2022-10-26 ENCOUNTER — OFFICE VISIT (OUTPATIENT)
Dept: NEUROLOGY | Facility: CLINIC | Age: 62
End: 2022-10-26
Payer: COMMERCIAL

## 2022-10-26 ENCOUNTER — APPOINTMENT (OUTPATIENT)
Dept: LAB | Facility: CLINIC | Age: 62
End: 2022-10-26
Payer: COMMERCIAL

## 2022-10-26 VITALS
HEART RATE: 85 BPM | BODY MASS INDEX: 32.44 KG/M2 | SYSTOLIC BLOOD PRESSURE: 140 MMHG | OXYGEN SATURATION: 96 % | WEIGHT: 166.1 LBS | DIASTOLIC BLOOD PRESSURE: 85 MMHG

## 2022-10-26 DIAGNOSIS — G40.909 RECURRENT SEIZURES (H): Primary | ICD-10-CM

## 2022-10-26 LAB — LEVETIRACETAM SERPL-MCNC: 45.1 ΜG/ML (ref 10–40)

## 2022-10-26 PROCEDURE — 80175 DRUG SCREEN QUAN LAMOTRIGINE: CPT | Mod: 90 | Performed by: PATHOLOGY

## 2022-10-26 PROCEDURE — 80177 DRUG SCRN QUAN LEVETIRACETAM: CPT | Performed by: PSYCHIATRY & NEUROLOGY

## 2022-10-26 PROCEDURE — 99214 OFFICE O/P EST MOD 30 MIN: CPT | Performed by: PSYCHIATRY & NEUROLOGY

## 2022-10-26 PROCEDURE — 36415 COLL VENOUS BLD VENIPUNCTURE: CPT | Performed by: PATHOLOGY

## 2022-10-26 PROCEDURE — 99000 SPECIMEN HANDLING OFFICE-LAB: CPT | Performed by: PATHOLOGY

## 2022-10-26 RX ORDER — IBUPROFEN 800 MG/1
800 TABLET, FILM COATED ORAL EVERY 8 HOURS PRN
Qty: 180 TABLET | Refills: 1 | Status: SHIPPED | OUTPATIENT
Start: 2022-10-26 | End: 2023-02-22

## 2022-10-26 ASSESSMENT — PAIN SCALES - GENERAL: PAINLEVEL: NO PAIN (0)

## 2022-10-26 NOTE — LETTER
"10/26/2022       RE: Hattie Mosher  [address] [facility] [address]     Dear Colleague,    Thank you for referring your patient, Hattie Mosher, to the [facility] [place] at [facility]. Please see a copy of my visit note below.    Chief Complaint:   Seizures     History of Present Illness:   In person follow up. 60 yo right handed woman with PMH significant for complex partial epilepsy, NOELLE, and migraine headache.  She was admitted for vEEG monitoring for epilepsy surgery workup in 2011.  She was followed by Dr. Freitas for many years.  Since the last visit about 3 months ago, she is not sure how often she is having seizures. She does not know when she has a seizure, unless the seizures are really bad.  Her  and daughter did not report any seizures during the last 3 months. She is currently on keppra 1250 mg BID, Lamictal 75 mg bid, no side effects were reported.    Recent EEG showed mildly abnormal EEG due to the presence of occasional left temporal theta slowing and rare left temporal poorly formed sharps.     Patient first started to have seizures when she was in her 30's.    She has 3 types of seizures. Type 1 is auras, which consisting of confusion, lightheadedness, occasional markie vu.  No strange smells or vision changes.  Now is almost once a day.   Type 2 is likely focal impaired seizures. \"Absence seizures\". They seizures consist of sudden loss of awareness without loss of consciousness.  She may get an aura consisting of confusion, lightheadedness, occasional markie vu before that.  The spell will last 1-3 minutes followed by confusion, lethargy, headache, followed by post-ictal fatigue. It may take her 1-2 days before she is back to her baseline.   She has no memory of the spells.  She has been told that she will appear confused, but will continue to talk, look around, and be able to move her arms/legs, etc as if she " were completely aware.  No incontinence and oral trauma.  Once a week according to the daughter.  Type 3 is likely GTCs. She only had twice in her life.    Patient is having a lot of memory issues per daughter.    Triggers: Stess.    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  No head injury with LOC.  No other CNS infections.  No strokes.  No developmental delay.  Chronically sleep deprived from untreated NOELLE.  Multiple servings of caffeine per day.        Current Outpatient Medications   Medication Sig Dispense Refill     amLODIPine (NORVASC) 5 MG tablet Take 1 tablet (5 mg) by mouth daily 90 tablet 3     atorvastatin (LIPITOR) 40 MG tablet Take 1 tablet (40 mg) by mouth daily 90 tablet 3     cetirizine (ZYRTEC) 10 MG tablet Take 10 mg by mouth every evening.       esomeprazole (NEXIUM) 40 MG DR capsule Take 40 mg by mouth       fexofenadine-pseudoePHEDrine (ALLEGRA-D)  MG 12 hr tablet Take 1 tablet by mouth every morning.       ibuprofen (ADVIL/MOTRIN) 800 MG tablet Take 1 tablet (800 mg) by mouth every 8 hours as needed for moderate pain 180 tablet 1     lamoTRIgine (LAMICTAL) 25 MG tablet TAKE 3 TABLETS BY MOUTH TWICE DAILY 180 tablet 5     levETIRAcetam (KEPPRA) 500 MG tablet TAKE 2 AND 1/2 TABLETS BY MOUTH TWICE DAILY 150 tablet 5     magnesium oxide (MAG-OX) 400 MG tablet Take 1 tablet (400 mg) by mouth daily 90 tablet 1     mometasone (NASONEX) 50 MCG/ACT nasal spray Spray 2 sprays into both nostrils daily as needed        omeprazole (PRILOSEC) 40 MG DR capsule Take 1 capsule (40 mg) by mouth daily 90 capsule 3     sertraline (ZOLOFT) 100 MG tablet Take 1 tablet (100 mg) by mouth daily 90 tablet 3     PAST AEDs:   Trileptal caused side effects.      Past Medical History:   Diagnosis Date     Apnea, sleep 2007    reeval 12/11 Mild NOELLE     Essential hypertension 6/7/2013     Migraine headaches      Seizure disorder, complex partial (H)      Seizures (H)        Past Surgical  History:   Procedure Laterality Date      SECTION      3x     COLONOSCOPY  2011    Procedure:COMBINED COLONOSCOPY, REMOVE TUMOR/POLYP/LESION BY SNARE; Surgeon:PAULA BEAN; Location: GI     COLONOSCOPY N/A 2022    Procedure: COLONOSCOPY;  Surgeon: Leventhal, Thomas Michael, MD;  Location:  GI     ENDOSCOPIC RELEASE CARPAL TUNNEL  2013    Procedure: ENDOSCOPIC RELEASE CARPAL TUNNEL;  Left Endoscopic Carpal Tunnel Release;  Surgeon: Kate Najera MD;  [facility]     ENDOSCOPIC RELEASE CARPAL TUNNEL  2013    Procedure: ENDOSCOPIC RELEASE CARPAL TUNNEL;  Right Endoscopic Carpal Tunnel Release  ;  Surgeon: Kate Najera MD;  [facility]: [facility]     HYSTERECTOMY      Ovaries intact.  No cancer.         Social History:         History   Substance Use Topics     Smoking status: Never Smoker      Smokeless tobacco: Not on file     Alcohol Use: 1.0 oz/week       2 drink(s) per week   --Works at [employer] for past 32 years.  , has 3 children.    --Drinks multiple servings of coffee per day.                Family History:    Family History            Family History   Problem Relation Age of Onset     Neurological Mother         MS      Cardiovascular Father         Aneurysm                 Allergies:    NKDA.                  Medications:      Prescriptions Prior to Admission             Prescriptions prior to admission   Medication Sig Dispense Refill     Spacer/Aero-Holding Chambers (AEROCHAMBER MV) MISC Use with albuterol inhaler         fexofenadine-pseudoephedrine (ALLEGRA-D 12 HOUR)  MG per tablet Take 1 tablet by mouth daily.         levetiracetam (KEPPRA) 500 MG tablet Take 500 mg by mouth 2 times daily.         atorvastatin (LIPITOR) 10 MG tablet Take 10 mg by mouth daily.         magnesium oxide (MAG-OXIDE) 400 MG tablet Take 1 tablet by mouth daily. Will need to see primary provider for further refills         Multiple Vitamin  (MULTI-VITAMIN) per tablet Take 1 tablet by mouth daily.         mometasone (NASONEX) 50 MCG/ACT nasal spray 2 sprays by Both Nostrils route daily.         esomeprazole (NEXIUM) 40 MG capsule Take 40 mg by mouth daily. Patient needs to see primary care provider         oxcarbazepine (TRILEPTAL) 300 MG tablet Take 1 1/2 tablets (450mg) AM and 2 tablets (600mg) PM         sertraline (ZOLOFT) 100 MG tablet Take 100 mg by mouth daily.         cetirizine (ZYRTEC) 10 MG tablet Take 10 mg by mouth daily.                Review of Systems:     Negative.           Physical Exam:        Blood pressure (!) 140/85, pulse 85, weight 75.3 kg (166 lb 1.6 oz), SpO2 96 %, not currently breastfeeding.    General exam: General Appearance: No acute distress. HEENT: Normocephalic, atraumatic. Neck: Supple.  Extremities: No edema.     Neurologic Exam: Alert and oriented x3. Speech fluent, appropriate. Normal attention. Cranial Nerves: Pupils are equal, round, reactive to light and accomodation. Extraocular movement intact. No facial weakness or asymmetry. Hearing normal. Motor Exam: Normal. Coordination:no ataxia.  Gait and Station: normal.      Investigations:   SUMMARY OF 8 DAYS OF VIDEO-EEG MONITORING (5/2008): The interictal recording was abnormal by virtue of generalized delta-theta slowing, often with bursts of high- amplitude delta slowing and drowsiness, with occasional left temporal polymorphic delta transients and left temporal sharp waves, and with rare right temporal polymorphic delta activities and right temporal sharp waves. No electrographic seizures were recorded during this monitoring procedure. No spells with confusion or other distinct behavioral alterations were recorded during this monitoring procedure. The bitemporal and generalized slowing indicates bitemporal and generalized cerebral dysfunction, which is etiologically nonspecific. This video-EEG monitoring procedure is consistent with the interictal state of  partial epilepsy, although no seizures were recorded. Clinical correlation is recommended.      SUMMARY OF 7 DAYS OF VIDEO-EEG MONITORING:  (4/2011)  The interictal EEG was abnormal due to focal independent and synchronous bitemporal delta slowing that often predominated on the left, to bursts of generalized delta-theta slowing, and to bitemporal independent spikes and sharp waves.  The interictal epileptiform abnormalities were much more frequent after the patient was tapered off antiepileptic medications, and the interictal epileptiform abnormalities declined in occurrence on re-introduction of anti-seizure medications.  The patient had a total of 29 electrographic seizures during her 7 days of monitoring, among which 8 events were definite electroclinical seizures that reportedly represented her habitual events, with the other 21 events not clearly associated with behavioral changes; many of the latter occurred when the patient was alone, without behavioral interaction during these events.  These 29 seizures occurred during Days 3, 4, and 5, when the patient was tapered off anti-seizure medications, and the frequent seizures stopped with re-introduction of anti-seizure medications.  The most common ictal discharge localization that occurred without significant artifactual obscuration was right temporal initial maximum followed by evolution to a sustained left temporal maximum of ictal discharges.  Other seizure patterns included left temporal initial maximum which was sustained, non-localizing ictal onsets with subsequent maxima on either or both sides, and obscured ictal onsets on either or both sides.  The patient also had subjective events, usually described as  hot flashes , none of which was associated with alteration of EEG activities from baseline.    The data in this monitoring procedure are consistent with temporal lobe epilepsy.  Clinical correlation is recommended.    Brain MRI (11/06):  Impression:  Very mild prominence of the right temporal horn.  Symmetrical volume and signal intensity of the hippocampi.       Neuropsych testing (3/2011):  There is no compelling evidence of intellectual decline from 7/08.  Auditory attention and word knowledge/expressive communicability are relative weaknesses, in the below average range. Graphomotor learning, verbal reasoning, and sequential analysis are low average.  Visuospatial processing/constructional abilities are a relative strength, in the above average to superior range.  Once again there is a pattern of poor recent (long-term) memory under verbal and nonverbal conditions.  On this occasion she actually had somewhat better (normal) long-term retention of a word list, but very poor long-term retention of lengthy story passages.  Her drawing of a complex figure is poorer on this occasion, in the impaired range, due to proportional and placement errors.  Long-term retention of simpler and more complex figural material is clearly in the impaired range on this occasion.  Recognition memory for figural material is inconsistent, ranging from mildly impaired to intact.  There has been some decline in nonverbal planning (maze solving), previously above average to superior, now low average.  Inductive reasoning on the other hand has improved, though she continues to show perseverative tendencies.  Set shifting, verbal associative fluency, and nonverbal associative fluency are essentially unchanged.  She continues to show poor speeded word retrieval.  Confrontation naming remains in the mildly impaired range, without much change from the last testing.  Finger tapping speeds and fine motor dexterity are slightly faster on this occasion, and now are in the normal range.       Taken as a whole, the findings do not provide clear, consistent evidence of cognitive decline.  The findings continue to suggest multifocal cerebral dysfunction, with the dominant (presumably left)  hemisphere more dysfunctional than the nondominant (right) hemisphere.  Mesial temporal brain regions bilaterally are dysfunctional.  That is consistent with a history of epilepsy, especially complex-partial epilepsy.                                                                                                             Assessment and Plan:     1. Focal epilepsy.  Since the last visit about 3 months ago, she is not sure how often she is having seizures. She does not know when she has a seizure, unless the seizures are really bad.  Her  and daughter did not report any seizures during the last 3 months. She is currently on keppra 1250 mg BID, Lamictal 75 mg bid, no side effects were reported.    2. Memory loss. Neuropsych evaluation.     Plan:  1. Continue Keppra 1250 mg bid and Lamictal 75 mg bid.  2. Labs: Keppra and Lamictal.  3. She would like to get a shingles vaccine today at pharmacy.  4. RTC in 6 months.      32 min total time was spent on the day of this visit.      22 min was spent on face to face time  5 min was spent on preparation of visit to review charts and labs, ordering medications and tests  5 min was spent on documentation of clinical information      Sincerely,    Nicola Ohara MD

## 2022-10-26 NOTE — PROGRESS NOTES
"  Chief Complaint:   Seizures     History of Present Illness:   In person follow up. 62 yo right handed woman with PMH significant for complex partial epilepsy, NOELLE, and migraine headache.  She was admitted for vEEG monitoring for epilepsy surgery workup in 2011.  She was followed by Dr. Freitas for many years.  Since the last visit about 3 months ago, she is not sure how often she is having seizures. She does not know when she has a seizure, unless the seizures are really bad.  Her  and daughter did not report any seizures during the last 3 months. She is currently on keppra 1250 mg BID, Lamictal 75 mg bid, no side effects were reported.    Recent EEG showed mildly abnormal EEG due to the presence of occasional left temporal theta slowing and rare left temporal poorly formed sharps.     Patient first started to have seizures when she was in her 30's.    She has 3 types of seizures. Type 1 is auras, which consisting of confusion, lightheadedness, occasional markie vu.  No strange smells or vision changes.  Now is almost once a day.   Type 2 is likely focal impaired seizures. \"Absence seizures\". They seizures consist of sudden loss of awareness without loss of consciousness.  She may get an aura consisting of confusion, lightheadedness, occasional markie vu before that.  The spell will last 1-3 minutes followed by confusion, lethargy, headache, followed by post-ictal fatigue. It may take her 1-2 days before she is back to her baseline.   She has no memory of the spells.  She has been told that she will appear confused, but will continue to talk, look around, and be able to move her arms/legs, etc as if she were completely aware.  No incontinence and oral trauma.  Once a week according to the daughter.  Type 3 is likely GTCs. She only had twice in her life.    Patient is having a lot of memory issues per daughter.    Triggers: Stess.    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  No head " injury with LOC.  No other CNS infections.  No strokes.  No developmental delay.  Chronically sleep deprived from untreated NOELLE.  Multiple servings of caffeine per day.        Current Outpatient Medications   Medication Sig Dispense Refill     amLODIPine (NORVASC) 5 MG tablet Take 1 tablet (5 mg) by mouth daily 90 tablet 3     atorvastatin (LIPITOR) 40 MG tablet Take 1 tablet (40 mg) by mouth daily 90 tablet 3     cetirizine (ZYRTEC) 10 MG tablet Take 10 mg by mouth every evening.       esomeprazole (NEXIUM) 40 MG DR capsule Take 40 mg by mouth       fexofenadine-pseudoePHEDrine (ALLEGRA-D)  MG 12 hr tablet Take 1 tablet by mouth every morning.       ibuprofen (ADVIL/MOTRIN) 800 MG tablet Take 1 tablet (800 mg) by mouth every 8 hours as needed for moderate pain 180 tablet 1     lamoTRIgine (LAMICTAL) 25 MG tablet TAKE 3 TABLETS BY MOUTH TWICE DAILY 180 tablet 5     levETIRAcetam (KEPPRA) 500 MG tablet TAKE 2 AND 1/2 TABLETS BY MOUTH TWICE DAILY 150 tablet 5     magnesium oxide (MAG-OX) 400 MG tablet Take 1 tablet (400 mg) by mouth daily 90 tablet 1     mometasone (NASONEX) 50 MCG/ACT nasal spray Spray 2 sprays into both nostrils daily as needed        omeprazole (PRILOSEC) 40 MG DR capsule Take 1 capsule (40 mg) by mouth daily 90 capsule 3     sertraline (ZOLOFT) 100 MG tablet Take 1 tablet (100 mg) by mouth daily 90 tablet 3     PAST AEDs:   Trileptal caused side effects.      Past Medical History:   Diagnosis Date     Apnea, sleep     reeval  Mild NOELLE     Essential hypertension 2013     Migraine headaches      Seizure disorder, complex partial (H)      Seizures (H)        Past Surgical History:   Procedure Laterality Date      SECTION      3x     COLONOSCOPY  2011    Procedure:COMBINED COLONOSCOPY, REMOVE TUMOR/POLYP/LESION BY SNARE; Surgeon:PAULA BEAN; Location: GI     COLONOSCOPY N/A 2022    Procedure: COLONOSCOPY;  Surgeon: Leventhal, Thomas Michael, MD;   Location:  GI     ENDOSCOPIC RELEASE CARPAL TUNNEL  6/27/2013    Procedure: ENDOSCOPIC RELEASE CARPAL TUNNEL;  Left Endoscopic Carpal Tunnel Release;  Surgeon: Kate Najera MD;  [facility]     ENDOSCOPIC RELEASE CARPAL TUNNEL  8/22/2013    Procedure: ENDOSCOPIC RELEASE CARPAL TUNNEL;  Right Endoscopic Carpal Tunnel Release  ;  Surgeon: Kate Najera MD;  [facility]     HYSTERECTOMY      Ovaries intact.  No cancer.         Social History:         History   Substance Use Topics     Smoking status: Never Smoker      Smokeless tobacco: Not on file     Alcohol Use: 1.0 oz/week       2 drink(s) per week   --Works at [employer] for past 32 years.  , has 3 children.    --Drinks multiple servings of coffee per day.                Family History:    Family History            Family History   Problem Relation Age of Onset     Neurological Mother         MS      Cardiovascular Father         Aneurysm                 Allergies:    NKDA.                  Medications:      Prescriptions Prior to Admission             Prescriptions prior to admission   Medication Sig Dispense Refill     Spacer/Aero-Holding Chambers (AEROCHAMBER MV) MISC Use with albuterol inhaler         fexofenadine-pseudoephedrine (ALLEGRA-D 12 HOUR)  MG per tablet Take 1 tablet by mouth daily.         levetiracetam (KEPPRA) 500 MG tablet Take 500 mg by mouth 2 times daily.         atorvastatin (LIPITOR) 10 MG tablet Take 10 mg by mouth daily.         magnesium oxide (MAG-OXIDE) 400 MG tablet Take 1 tablet by mouth daily. Will need to see primary provider for further refills         Multiple Vitamin (MULTI-VITAMIN) per tablet Take 1 tablet by mouth daily.         mometasone (NASONEX) 50 MCG/ACT nasal spray 2 sprays by Both Nostrils route daily.         esomeprazole (NEXIUM) 40 MG capsule Take 40 mg by mouth daily. Patient needs to see primary care provider         oxcarbazepine (TRILEPTAL) 300 MG tablet Take 1 1/2  tablets (450mg) AM and 2 tablets (600mg) PM         sertraline (ZOLOFT) 100 MG tablet Take 100 mg by mouth daily.         cetirizine (ZYRTEC) 10 MG tablet Take 10 mg by mouth daily.                Review of Systems:     Negative.           Physical Exam:        Blood pressure (!) 140/85, pulse 85, weight 75.3 kg (166 lb 1.6 oz), SpO2 96 %, not currently breastfeeding.    General exam: General Appearance: No acute distress. HEENT: Normocephalic, atraumatic. Neck: Supple.  Extremities: No edema.     Neurologic Exam: Alert and oriented x3. Speech fluent, appropriate. Normal attention. Cranial Nerves: Pupils are equal, round, reactive to light and accomodation. Extraocular movement intact. No facial weakness or asymmetry. Hearing normal. Motor Exam: Normal. Coordination:no ataxia.  Gait and Station: normal.      Investigations:   SUMMARY OF 8 DAYS OF VIDEO-EEG MONITORING (5/2008): The interictal recording was abnormal by virtue of generalized delta-theta slowing, often with bursts of high- amplitude delta slowing and drowsiness, with occasional left temporal polymorphic delta transients and left temporal sharp waves, and with rare right temporal polymorphic delta activities and right temporal sharp waves. No electrographic seizures were recorded during this monitoring procedure. No spells with confusion or other distinct behavioral alterations were recorded during this monitoring procedure. The bitemporal and generalized slowing indicates bitemporal and generalized cerebral dysfunction, which is etiologically nonspecific. This video-EEG monitoring procedure is consistent with the interictal state of partial epilepsy, although no seizures were recorded. Clinical correlation is recommended.      SUMMARY OF 7 DAYS OF VIDEO-EEG MONITORING:  (4/2011)  The interictal EEG was abnormal due to focal independent and synchronous bitemporal delta slowing that often predominated on the left, to bursts of generalized delta-theta  slowing, and to bitemporal independent spikes and sharp waves.  The interictal epileptiform abnormalities were much more frequent after the patient was tapered off antiepileptic medications, and the interictal epileptiform abnormalities declined in occurrence on re-introduction of anti-seizure medications.  The patient had a total of 29 electrographic seizures during her 7 days of monitoring, among which 8 events were definite electroclinical seizures that reportedly represented her habitual events, with the other 21 events not clearly associated with behavioral changes; many of the latter occurred when the patient was alone, without behavioral interaction during these events.  These 29 seizures occurred during Days 3, 4, and 5, when the patient was tapered off anti-seizure medications, and the frequent seizures stopped with re-introduction of anti-seizure medications.  The most common ictal discharge localization that occurred without significant artifactual obscuration was right temporal initial maximum followed by evolution to a sustained left temporal maximum of ictal discharges.  Other seizure patterns included left temporal initial maximum which was sustained, non-localizing ictal onsets with subsequent maxima on either or both sides, and obscured ictal onsets on either or both sides.  The patient also had subjective events, usually described as  hot flashes , none of which was associated with alteration of EEG activities from baseline.    The data in this monitoring procedure are consistent with temporal lobe epilepsy.  Clinical correlation is recommended.    Brain MRI (11/06):  Impression: Very mild prominence of the right temporal horn.  Symmetrical volume and signal intensity of the hippocampi.       Neuropsych testing (3/2011):  There is no compelling evidence of intellectual decline from 7/08.  Auditory attention and word knowledge/expressive communicability are relative weaknesses, in the below average  range. Graphomotor learning, verbal reasoning, and sequential analysis are low average.  Visuospatial processing/constructional abilities are a relative strength, in the above average to superior range.  Once again there is a pattern of poor recent (long-term) memory under verbal and nonverbal conditions.  On this occasion she actually had somewhat better (normal) long-term retention of a word list, but very poor long-term retention of lengthy story passages.  Her drawing of a complex figure is poorer on this occasion, in the impaired range, due to proportional and placement errors.  Long-term retention of simpler and more complex figural material is clearly in the impaired range on this occasion.  Recognition memory for figural material is inconsistent, ranging from mildly impaired to intact.  There has been some decline in nonverbal planning (maze solving), previously above average to superior, now low average.  Inductive reasoning on the other hand has improved, though she continues to show perseverative tendencies.  Set shifting, verbal associative fluency, and nonverbal associative fluency are essentially unchanged.  She continues to show poor speeded word retrieval.  Confrontation naming remains in the mildly impaired range, without much change from the last testing.  Finger tapping speeds and fine motor dexterity are slightly faster on this occasion, and now are in the normal range.       Taken as a whole, the findings do not provide clear, consistent evidence of cognitive decline.  The findings continue to suggest multifocal cerebral dysfunction, with the dominant (presumably left) hemisphere more dysfunctional than the nondominant (right) hemisphere.  Mesial temporal brain regions bilaterally are dysfunctional.  That is consistent with a history of epilepsy, especially complex-partial epilepsy.                                                                                                              Assessment and Plan:     1. Focal epilepsy.  Since the last visit about 3 months ago, she is not sure how often she is having seizures. She does not know when she has a seizure, unless the seizures are really bad.  Her  and daughter did not report any seizures during the last 3 months. She is currently on keppra 1250 mg BID, Lamictal 75 mg bid, no side effects were reported.    2. Memory loss. Neuropsych evaluation.     Plan:  1. Continue Keppra 1250 mg bid and Lamictal 75 mg bid.  2. Labs: Keppra and Lamictal.  3. She would like to get a shingles vaccine today at pharmacy.  4. RTC in 6 months.      32 min total time was spent on the day of this visit.      22 min was spent on face to face time  5 min was spent on preparation of visit to review charts and labs, ordering medications and tests  5 min was spent on documentation of clinical information

## 2022-10-26 NOTE — TELEPHONE ENCOUNTER
Ibuprofen 800 MG Oral Tablet  Last Written Prescription Date:   9/20/2022  Last Fill Quantity: 90,   # refills: 1  Last Office Visit :  3/25/2022  Future Office visit:  None  Pt/pharmacy requesting a year supply  Refer to clinic for review and refills per Providers orders.       Brynn Shukla RN  Central Triage Red Flags/Med Refills

## 2022-10-27 LAB — LAMOTRIGINE SERPL-MCNC: 3.5 UG/ML

## 2022-11-07 ENCOUNTER — MYC REFILL (OUTPATIENT)
Dept: FAMILY MEDICINE | Facility: CLINIC | Age: 62
End: 2022-11-07

## 2022-11-08 NOTE — TELEPHONE ENCOUNTER
esomeprazole (NEXIUM) 40 MG DR capsule  Last Written Prescription Date:  unknown  Last Fill Quantity: unknown,   # refills: unknown  Last Office Visit : 10/26/22  Future Office visit:  none    Routing refill request to provider for review/approval because: historical

## 2022-11-11 RX ORDER — ESOMEPRAZOLE MAGNESIUM 40 MG/1
40 CAPSULE, DELAYED RELEASE ORAL
OUTPATIENT
Start: 2022-11-11

## 2023-01-06 DIAGNOSIS — R51.9 HEADACHE, UNSPECIFIED HEADACHE TYPE: ICD-10-CM

## 2023-01-10 RX ORDER — IBUPROFEN 800 MG/1
TABLET, FILM COATED ORAL
Qty: 270 TABLET | Refills: 3 | OUTPATIENT
Start: 2023-01-10

## 2023-02-16 DIAGNOSIS — R51.9 HEADACHE, UNSPECIFIED HEADACHE TYPE: ICD-10-CM

## 2023-02-19 DIAGNOSIS — E78.00 HIGH BLOOD CHOLESTEROL: ICD-10-CM

## 2023-02-19 DIAGNOSIS — F33.1 MAJOR DEPRESSIVE DISORDER, RECURRENT EPISODE, MODERATE (H): ICD-10-CM

## 2023-02-19 DIAGNOSIS — I10 BENIGN ESSENTIAL HYPERTENSION: ICD-10-CM

## 2023-02-20 NOTE — TELEPHONE ENCOUNTER
ibuprofen (ADVIL/MOTRIN) 800 MG   Last Written Prescription Date:  10/26/22  Last Fill Quantity: 180,   # refills: 1  Last Office Visit : 3/25/22  Future Office visit:  none  Routing refill request to provider for review/approval because:  Failed protocol  BP

## 2023-02-22 RX ORDER — SERTRALINE HYDROCHLORIDE 100 MG/1
100 TABLET, FILM COATED ORAL DAILY
Qty: 90 TABLET | Refills: 0 | Status: SHIPPED | OUTPATIENT
Start: 2023-02-22 | End: 2023-04-14

## 2023-02-22 RX ORDER — IBUPROFEN 800 MG/1
TABLET, FILM COATED ORAL
Qty: 90 TABLET | Refills: 0 | Status: SHIPPED | OUTPATIENT
Start: 2023-02-22

## 2023-02-22 RX ORDER — ATORVASTATIN CALCIUM 40 MG/1
40 TABLET, FILM COATED ORAL DAILY
Qty: 90 TABLET | Refills: 0 | Status: SHIPPED | OUTPATIENT
Start: 2023-02-22 | End: 2023-04-14

## 2023-02-22 RX ORDER — AMLODIPINE BESYLATE 5 MG/1
5 TABLET ORAL DAILY
Qty: 90 TABLET | Refills: 0 | Status: SHIPPED | OUTPATIENT
Start: 2023-02-22 | End: 2023-04-14

## 2023-02-22 NOTE — TELEPHONE ENCOUNTER
amLODIPine Besylate 5 MG Oral Tablet  Last Written Prescription Date:  3/25/2022  Last Fill Quantity: 90,   # refills: 3  Last Office Visit :  3/25/2022  Future Office visit:  None  Routing refill request to provider for review/approval because:  Abnormal B/P   Change med?  Change dose/  Add med?  Follow up B/P check?  BP Readings from Last 3 Encounters:   10/26/22 (!) 140/85   07/20/22 (!) 150/83   04/25/22 122/65       Sertraline HCl 100 MG Oral Tablet  Last Written Prescription Date:  3/25/2022  Last Fill Quantity: 90,   # refills: 3  Last Office Visit :  3/25/2022  Future Office visit:  None  Routing refill request to provider for review/approval because:  Needing updated PHQ-9 on file and 6 month follow up visit  Refer to clinic for review     Atorvastatin Calcium 40 MG Oral Tablet  Last Written Prescription Date:  3/25/2022  Last Fill Quantity: 90,   # refills: 3  Last Office Visit :  3/25/2022  Future Office visit:  None  Routing refill request to provider for review/approval because:  Refer to clinic for review     Brynn Shukla RN  Central Triage Red Flags/Med Refills

## 2023-02-25 DIAGNOSIS — G40.219 PARTIAL SYMPTOMATIC EPILEPSY WITH COMPLEX PARTIAL SEIZURES, INTRACTABLE, WITHOUT STATUS EPILEPTICUS (H): ICD-10-CM

## 2023-02-25 DIAGNOSIS — G40.309 GENERALIZED CONVULSIVE EPILEPSY (H): ICD-10-CM

## 2023-03-01 RX ORDER — LEVETIRACETAM 500 MG/1
TABLET ORAL
Qty: 150 TABLET | Refills: 5 | Status: SHIPPED | OUTPATIENT
Start: 2023-03-01 | End: 2023-08-30

## 2023-03-01 RX ORDER — LAMOTRIGINE 25 MG/1
TABLET ORAL
Qty: 180 TABLET | Refills: 5 | Status: SHIPPED | OUTPATIENT
Start: 2023-03-01 | End: 2023-08-30

## 2023-03-29 DIAGNOSIS — K21.9 GASTROESOPHAGEAL REFLUX DISEASE WITHOUT ESOPHAGITIS: ICD-10-CM

## 2023-04-03 RX ORDER — OMEPRAZOLE 40 MG/1
40 CAPSULE, DELAYED RELEASE ORAL DAILY
Qty: 90 CAPSULE | Refills: 0 | Status: SHIPPED | OUTPATIENT
Start: 2023-04-03 | End: 2023-04-14

## 2023-04-03 NOTE — TELEPHONE ENCOUNTER
Last Clinic Visit: 3/25/2022  [facility]  90 DAY DEMETRI REFILL SENT  Scheduling has been notified to contact the pt for appointment.

## 2023-04-14 ENCOUNTER — LAB (OUTPATIENT)
Dept: LAB | Facility: CLINIC | Age: 63
End: 2023-04-14
Payer: COMMERCIAL

## 2023-04-14 ENCOUNTER — TELEPHONE (OUTPATIENT)
Dept: FAMILY MEDICINE | Facility: CLINIC | Age: 63
End: 2023-04-14

## 2023-04-14 ENCOUNTER — OFFICE VISIT (OUTPATIENT)
Dept: FAMILY MEDICINE | Facility: CLINIC | Age: 63
End: 2023-04-14
Payer: COMMERCIAL

## 2023-04-14 VITALS
WEIGHT: 164.1 LBS | DIASTOLIC BLOOD PRESSURE: 80 MMHG | HEART RATE: 72 BPM | OXYGEN SATURATION: 99 % | BODY MASS INDEX: 32.05 KG/M2 | SYSTOLIC BLOOD PRESSURE: 121 MMHG

## 2023-04-14 DIAGNOSIS — F33.1 MAJOR DEPRESSIVE DISORDER, RECURRENT EPISODE, MODERATE (H): ICD-10-CM

## 2023-04-14 DIAGNOSIS — E78.00 HIGH BLOOD CHOLESTEROL: ICD-10-CM

## 2023-04-14 DIAGNOSIS — E78.00 HIGH BLOOD CHOLESTEROL: Primary | ICD-10-CM

## 2023-04-14 DIAGNOSIS — K21.9 GASTROESOPHAGEAL REFLUX DISEASE WITHOUT ESOPHAGITIS: ICD-10-CM

## 2023-04-14 DIAGNOSIS — I10 BENIGN ESSENTIAL HYPERTENSION: Primary | ICD-10-CM

## 2023-04-14 LAB
ALBUMIN SERPL BCG-MCNC: 4.5 G/DL (ref 3.5–5.2)
ALP SERPL-CCNC: 91 U/L (ref 35–104)
ALT SERPL W P-5'-P-CCNC: 32 U/L (ref 10–35)
ANION GAP SERPL CALCULATED.3IONS-SCNC: 8 MMOL/L (ref 7–15)
AST SERPL W P-5'-P-CCNC: 30 U/L (ref 10–35)
BILIRUB SERPL-MCNC: 0.4 MG/DL
BUN SERPL-MCNC: 13.2 MG/DL (ref 8–23)
CALCIUM SERPL-MCNC: 10 MG/DL (ref 8.8–10.2)
CHLORIDE SERPL-SCNC: 103 MMOL/L (ref 98–107)
CHOLEST SERPL-MCNC: 214 MG/DL
CREAT SERPL-MCNC: 0.8 MG/DL (ref 0.51–0.95)
DEPRECATED HCO3 PLAS-SCNC: 28 MMOL/L (ref 22–29)
GFR SERPL CREATININE-BSD FRML MDRD: 82 ML/MIN/1.73M2
GLUCOSE SERPL-MCNC: 116 MG/DL (ref 70–99)
HDLC SERPL-MCNC: 70 MG/DL
LDLC SERPL CALC-MCNC: 121 MG/DL
NONHDLC SERPL-MCNC: 144 MG/DL
POTASSIUM SERPL-SCNC: 4.6 MMOL/L (ref 3.4–5.3)
PROT SERPL-MCNC: 7.6 G/DL (ref 6.4–8.3)
SODIUM SERPL-SCNC: 139 MMOL/L (ref 136–145)
TRIGL SERPL-MCNC: 113 MG/DL

## 2023-04-14 PROCEDURE — 80053 COMPREHEN METABOLIC PANEL: CPT | Performed by: PATHOLOGY

## 2023-04-14 PROCEDURE — 80061 LIPID PANEL: CPT | Performed by: PATHOLOGY

## 2023-04-14 PROCEDURE — 36415 COLL VENOUS BLD VENIPUNCTURE: CPT | Performed by: PATHOLOGY

## 2023-04-14 PROCEDURE — 99396 PREV VISIT EST AGE 40-64: CPT | Performed by: FAMILY MEDICINE

## 2023-04-14 RX ORDER — AMLODIPINE BESYLATE 5 MG/1
5 TABLET ORAL DAILY
Qty: 90 TABLET | Refills: 3 | Status: SHIPPED | OUTPATIENT
Start: 2023-04-14 | End: 2024-04-01

## 2023-04-14 RX ORDER — OMEPRAZOLE 40 MG/1
40 CAPSULE, DELAYED RELEASE ORAL DAILY
Qty: 90 CAPSULE | Refills: 3 | Status: SHIPPED | OUTPATIENT
Start: 2023-04-14 | End: 2024-02-20

## 2023-04-14 RX ORDER — SERTRALINE HYDROCHLORIDE 100 MG/1
100 TABLET, FILM COATED ORAL DAILY
Qty: 90 TABLET | Refills: 3 | Status: SHIPPED | OUTPATIENT
Start: 2023-04-14 | End: 2024-04-01

## 2023-04-14 RX ORDER — ATORVASTATIN CALCIUM 40 MG/1
40 TABLET, FILM COATED ORAL DAILY
Qty: 90 TABLET | Refills: 3 | Status: SHIPPED | OUTPATIENT
Start: 2023-04-14 | End: 2024-04-01

## 2023-04-14 NOTE — NURSING NOTE
Hattie Mosher is a 62 year old female that presents in clinic today for the following:     Chief Complaint   Patient presents with     Physical     Pt here for annual physical        The patient's allergies and medications were reviewed. The patient's vitals were obtained without incident. The patient does not have any other questions for the provider.     Eric Harper, EMT at 2:26 PM on 4/14/2023.  Primary Care Clinic: [phone]

## 2023-04-14 NOTE — PROGRESS NOTES
Assessment & Plan     Benign essential hypertension  COnt  - amLODIPine (NORVASC) 5 MG tablet; Take 1 tablet (5 mg) by mouth daily    High blood cholesterol  Same  - atorvastatin (LIPITOR) 40 MG tablet; Take 1 tablet (40 mg) by mouth daily    Gastroesophageal reflux disease without esophagitis  Continue  - omeprazole (PRILOSEC) 40 MG DR capsule; Take 1 capsule (40 mg) by mouth daily    Major depressive disorder, recurrent episode, moderate (H)  Continue as is  - sertraline (ZOLOFT) 100 MG tablet; Take 1 tablet (100 mg) by mouth daily      38 minutes spent by me on the date of the encounter doing chart review, history and exam, documentation and further activities per the note    BMI:   Estimated body mass index is 32.05 kg/m  as calculated from the following:    Height as of 22: 1.524 m (5').    Weight as of this encounter: 74.4 kg (164 lb 1.6 oz).     Return in about 1 year (around 2024).    Yg Sharp MD  [facility] [place]    Shai Kuhn is a 62 year old, presenting for the following health issues:  Physical (Pt here for annual physical )         View : No data to display.              HPI   Doing well  GERD controlled by PPI  Mood controlled by well tolerated zoloft  BP controlled by current meds  Due for labs for statin  No sz for very long time, sees neuro for  Does Gyn/Mammo elsewhere routinely  UTD colonoscopy, dexa  Shots:  I suggest dtap[facility]shingrix at [facility]  Has NOELLE she's elected not to treat    Works part time, might retire later this year    Past Medical History:   Diagnosis Date     Apnea, sleep     reeval  Mild NOELLE     Essential hypertension 2013     Migraine headaches      Seizure disorder, complex partial (H)      Seizures (H)      Past Surgical History:   Procedure Laterality Date      SECTION      3x     COLONOSCOPY  2011    Procedure:COMBINED COLONOSCOPY, REMOVE TUMOR/POLYP/LESION BY SNARE; Surgeon:VIKAS  PAULA FITZGERALD; Location: GI     COLONOSCOPY N/A 4/25/2022    Procedure: COLONOSCOPY;  Surgeon: Leventhal, Thomas Michael, MD;  Location:  GI     ENDOSCOPIC RELEASE CARPAL TUNNEL  6/27/2013    Procedure: ENDOSCOPIC RELEASE CARPAL TUNNEL;  Left Endoscopic Carpal Tunnel Release;  Surgeon: Kate Najera MD;  [facility]     ENDOSCOPIC RELEASE CARPAL TUNNEL  8/22/2013    Procedure: ENDOSCOPIC RELEASE CARPAL TUNNEL;  Right Endoscopic Carpal Tunnel Release  ;  Surgeon: Kate Najera MD;  [facility]     HYSTERECTOMY      Ovaries intact.  No cancer.      Current Outpatient Medications   Medication     amLODIPine (NORVASC) 5 MG tablet     atorvastatin (LIPITOR) 40 MG tablet     cetirizine (ZYRTEC) 10 MG tablet     esomeprazole (NEXIUM) 40 MG DR capsule     fexofenadine-pseudoePHEDrine (ALLEGRA-D)  MG 12 hr tablet     ibuprofen (ADVIL/MOTRIN) 800 MG tablet     lamoTRIgine (LAMICTAL) 25 MG tablet     levETIRAcetam (KEPPRA) 500 MG tablet     magnesium oxide (MAG-OX) 400 MG tablet     mometasone (NASONEX) 50 MCG/ACT nasal spray     omeprazole (PRILOSEC) 40 MG DR capsule     sertraline (ZOLOFT) 100 MG tablet     No current facility-administered medications for this visit.     Allergies   Allergen Reactions     Nkda [No Known Drug Allergies]      Seasonal Allergies      Family History   Problem Relation Age of Onset     Neurologic Disorder Mother         MS     Cardiovascular Father         Aneurysm     Glaucoma Brother      Retinal detachment No family hx of      Macular Degeneration No family hx of      Social History     Socioeconomic History     Marital status:      Spouse name: Not on file     Number of children: Not on file     Years of education: Not on file     Highest education level: Not on file   Occupational History     Occupation:      Employer: [employer]   Tobacco Use     Smoking status: Never     Smokeless tobacco: Never   Vaping Use     Vaping  status: Not on file   Substance and Sexual Activity     Alcohol use: Yes     Alcohol/week: 2.0 standard drinks of alcohol     Types: 2 Standard drinks or equivalent per week     Comment: 1-2 drinks/week     Drug use: No     Sexual activity: Yes     Partners: Male   Other Topics Concern      Service Not Asked     Blood Transfusions Not Asked     Caffeine Concern Yes     Comment: Drinks several cups of coffee/day     Occupational Exposure Not Asked     Hobby Hazards Not Asked     Sleep Concern Yes     Comment: Reports history of untreated sleep apnea     Stress Concern Not Asked     Weight Concern Not Asked     Special Diet Not Asked     Back Care Not Asked     Exercise Not Asked     Bike Helmet Not Asked     Seat Belt Not Asked     Self-Exams Not Asked     Parent/sibling w/ CABG, MI or angioplasty before 65F 55M? Not Asked   Social History Narrative     Not on file     Social Determinants of Health     Financial Resource Strain: Not on file   Food Insecurity: Not on file   Transportation Needs: Not on file   Physical Activity: Not on file   Stress: Not on file   Social Connections: Not on file   Intimate Partner Violence: Not on file   Housing Stability: Not on file         Review of Systems         Objective    /80 (BP Location: Right arm, Patient Position: Sitting, Cuff Size: Adult Large)   Pulse 72   Wt 74.4 kg (164 lb 1.6 oz)   SpO2 99%   BMI 32.05 kg/m    Body mass index is 32.05 kg/m .  Physical Exam   GENERAL: healthy, alert and no distress  EYES: Eyes grossly normal to inspection, PERRL and conjunctivae and sclerae normal  HENT: ear canals and TM's normal, nose and mouth without ulcers or lesions  NECK: no adenopathy, no asymmetry, masses, or scars and thyroid normal to palpation  RESP: lungs clear to auscultation - no rales, rhonchi or wheezes  CV: regular rate and rhythm, normal S1 S2, no S3 or S4, no murmur, click or rub, no peripheral edema and peripheral pulses strong  ABDOMEN: soft,  nontender, no hepatosplenomegaly, no masses and bowel sounds normal  MS: no gross musculoskeletal defects noted, no edema

## 2023-04-14 NOTE — TELEPHONE ENCOUNTER
" [facility] Call Center    Phone Message    May a detailed message be left on voicemail: yes     Reason for Call: Other: Patient  is calling stating she has been fasting for her physical today at 3pm. There are no orders for lab work and nothing is scheduled, they are wanting to know if PCP can put order in to check cholesterol and whatever else is \"normal\" physical. Please call patient  back      Action Taken: Message routed to:  [facility] pcp    Travel Screening: Not Applicable                                                                      "

## 2023-04-14 NOTE — TELEPHONE ENCOUNTER
Called and informed pt that all necessary labs was order to have done prior to 3pm appointment.    Krysta Sampson MA

## 2023-04-26 ENCOUNTER — VIRTUAL VISIT (OUTPATIENT)
Dept: NEUROLOGY | Facility: CLINIC | Age: 63
End: 2023-04-26
Payer: COMMERCIAL

## 2023-04-26 DIAGNOSIS — G43.919 INTRACTABLE MIGRAINE WITHOUT STATUS MIGRAINOSUS, UNSPECIFIED MIGRAINE TYPE: Primary | ICD-10-CM

## 2023-04-26 PROCEDURE — 99213 OFFICE O/P EST LOW 20 MIN: CPT | Mod: VID | Performed by: PSYCHIATRY & NEUROLOGY

## 2023-04-26 RX ORDER — TOPIRAMATE 25 MG/1
25 TABLET, FILM COATED ORAL 2 TIMES DAILY
Qty: 60 TABLET | Refills: 5 | Status: SHIPPED | OUTPATIENT
Start: 2023-04-26 | End: 2023-11-07

## 2023-04-26 NOTE — PROGRESS NOTES
Hattie is a 62 year old who is being evaluated via a billable video visit.      How would you like to obtain your AVS? MyChart  If the video visit is dropped, the invitation should be resent by: [phone]  [email or web address]  {If patient encounters technical issues they should call [phone] :[id number]}      Video-Visit Details    Type of service:  Video Visit     Originating Location (pt. Location): Home  {PROVIDER LOCATION On-site should be selected for visits conducted from your clinic location or adjoining [facility] hospital, academic office, or other nearby [facility] building. Off-site should be selected for all other provider locations, including home:[id number]}  Distant Location (provider location):  On-site  Platform used for Video Visit: Maylin

## 2023-04-26 NOTE — LETTER
"4/26/2023       RE: Hattie Mosher  [address] [facility] [id number]     Dear Colleague,    Thank you for referring your patient, Hattie Mosher, to the [facility] [place] at [facility]. Please see a copy of my visit note below.    Chief Complaint:   Seizures     History of Present Illness:   Video call for follow up. 63 yo right handed woman with PMH significant for complex partial epilepsy, NOELLE, and migraine headache.  She was admitted for vEEG monitoring for epilepsy surgery workup in 2011.  She was followed by Dr. Freitas for many years.  Since the last visit about 6 months ago, she had no seizures. Her  did not see any seizures for the past 6 months. Her last seizure was probably in mid 2022. She is currently on keppra 1250 mg BID, Lamictal 75 mg bid, no side effects were reported.    She has been having headaches almost daily in the morning after she gets up. She is having migraines once a week, with photophobia and phonophobia, and nausea, intensity 7-8/10.    Recent EEG showed mildly abnormal EEG due to the presence of occasional left temporal theta slowing and rare left temporal poorly formed sharps.     Patient first started to have seizures when she was in her 30's.    She has 3 types of seizures. Type 1 is auras, which consisting of confusion, lightheadedness, occasional markie vu.  No strange smells or vision changes.  Now is almost once a day.   Type 2 is likely focal impaired seizures. \"Absence seizures\". They seizures consist of sudden loss of awareness without loss of consciousness.  She may get an aura consisting of confusion, lightheadedness, occasional markie vu before that.  The spell will last 1-3 minutes followed by confusion, lethargy, headache, followed by post-ictal fatigue. It may take her 1-2 days before she is back to her baseline.   She has no memory of the spells.  She has been told that she will appear " confused, but will continue to talk, look around, and be able to move her arms/legs, etc as if she were completely aware.  No incontinence and oral trauma.  Once a week according to the daughter.  Type 3 is likely GTCs. She only had twice in her life.    Patient is having a lot of memory issues per daughter.    Triggers: Stess.    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  No head injury with LOC.  No other CNS infections.  No strokes.  No developmental delay.  Chronically sleep deprived from untreated NOELLE.  Multiple servings of caffeine per day.        Current Outpatient Medications   Medication Sig Dispense Refill    amLODIPine (NORVASC) 5 MG tablet Take 1 tablet (5 mg) by mouth daily 90 tablet 3    atorvastatin (LIPITOR) 40 MG tablet Take 1 tablet (40 mg) by mouth daily 90 tablet 3    cetirizine (ZYRTEC) 10 MG tablet Take 10 mg by mouth every evening.      esomeprazole (NEXIUM) 40 MG DR capsule Take 40 mg by mouth      fexofenadine-pseudoePHEDrine (ALLEGRA-D)  MG 12 hr tablet Take 1 tablet by mouth every morning.      ibuprofen (ADVIL/MOTRIN) 800 MG tablet TAKE 1 TABLET BY MOUTH EVERY 8  HOURS AS NEEDED FOR MODERATE  PAIN 90 tablet 0    lamoTRIgine (LAMICTAL) 25 MG tablet TAKE 3 TABLETS BY MOUTH TWICE DAILY 180 tablet 5    levETIRAcetam (KEPPRA) 500 MG tablet TAKE 2 AND 1/2 TABLETS BY MOUTH TWICE DAILY 150 tablet 5    magnesium oxide (MAG-OX) 400 MG tablet Take 1 tablet (400 mg) by mouth daily 90 tablet 1    mometasone (NASONEX) 50 MCG/ACT nasal spray Spray 2 sprays into both nostrils daily as needed       omeprazole (PRILOSEC) 40 MG DR capsule Take 1 capsule (40 mg) by mouth daily 90 capsule 3    sertraline (ZOLOFT) 100 MG tablet Take 1 tablet (100 mg) by mouth daily 90 tablet 3     PAST AEDs:   Trileptal caused side effects.      Past Medical History:   Diagnosis Date    Apnea, sleep 2007    reeval 12/11 Mild NOELLE    Essential hypertension 6/7/2013    Migraine headaches      Seizure disorder, complex partial (H)     Seizures (H)        Past Surgical History:   Procedure Laterality Date     SECTION      3x    COLONOSCOPY  2011    Procedure:COMBINED COLONOSCOPY, REMOVE TUMOR/POLYP/LESION BY SNARE; Surgeon:PAULA BEAN; Location: GI    COLONOSCOPY N/A 2022    Procedure: COLONOSCOPY;  Surgeon: Leventhal, Thomas Michael, MD;  Location:  GI    ENDOSCOPIC RELEASE CARPAL TUNNEL  2013    Procedure: ENDOSCOPIC RELEASE CARPAL TUNNEL;  Left Endoscopic Carpal Tunnel Release;  Surgeon: Kate Najera MD;  Location: [facility]    ENDOSCOPIC RELEASE CARPAL TUNNEL  2013    Procedure: ENDOSCOPIC RELEASE CARPAL TUNNEL;  Right Endoscopic Carpal Tunnel Release  ;  Surgeon: Kate Najera MD;  Location:  OR    HYSTERECTOMY      Ovaries intact.  No cancer.         Social History:         History   Substance Use Topics    Smoking status: Never Smoker     Smokeless tobacco: Not on file    Alcohol Use: 1.0 oz/week       2 drink(s) per week   --Works at [employer] for past 32 years.  , has 3 children.    --Drinks multiple servings of coffee per day.                Family History:    Family History            Family History   Problem Relation Age of Onset    Neurological Mother         MS     Cardiovascular Father         Aneurysm                 Allergies:    NKDA.                  Medications:      Prescriptions Prior to Admission             Prescriptions prior to admission   Medication Sig Dispense Refill    Spacer/Aero-Holding Chambers (AEROCHAMBER MV) MISC Use with albuterol inhaler        fexofenadine-pseudoephedrine (ALLEGRA-D 12 HOUR)  MG per tablet Take 1 tablet by mouth daily.        levetiracetam (KEPPRA) 500 MG tablet Take 500 mg by mouth 2 times daily.        atorvastatin (LIPITOR) 10 MG tablet Take 10 mg by mouth daily.        magnesium oxide (MAG-OXIDE) 400 MG tablet Take 1 tablet by mouth daily. Will need to see primary  provider for further refills        Multiple Vitamin (MULTI-VITAMIN) per tablet Take 1 tablet by mouth daily.        mometasone (NASONEX) 50 MCG/ACT nasal spray 2 sprays by Both Nostrils route daily.        esomeprazole (NEXIUM) 40 MG capsule Take 40 mg by mouth daily. Patient needs to see primary care provider        oxcarbazepine (TRILEPTAL) 300 MG tablet Take 1 1/2 tablets (450mg) AM and 2 tablets (600mg) PM        sertraline (ZOLOFT) 100 MG tablet Take 100 mg by mouth daily.        cetirizine (ZYRTEC) 10 MG tablet Take 10 mg by mouth daily.                Review of Systems:     Negative.           Physical Exam:          AAX 3, speech fluent and appropriate, hearing normal. No facial weakness.      Investigations:   SUMMARY OF 8 DAYS OF VIDEO-EEG MONITORING (5/2008): The interictal recording was abnormal by virtue of generalized delta-theta slowing, often with bursts of high- amplitude delta slowing and drowsiness, with occasional left temporal polymorphic delta transients and left temporal sharp waves, and with rare right temporal polymorphic delta activities and right temporal sharp waves. No electrographic seizures were recorded during this monitoring procedure. No spells with confusion or other distinct behavioral alterations were recorded during this monitoring procedure. The bitemporal and generalized slowing indicates bitemporal and generalized cerebral dysfunction, which is etiologically nonspecific. This video-EEG monitoring procedure is consistent with the interictal state of partial epilepsy, although no seizures were recorded. Clinical correlation is recommended.      SUMMARY OF 7 DAYS OF VIDEO-EEG MONITORING:  (4/2011)  The interictal EEG was abnormal due to focal independent and synchronous bitemporal delta slowing that often predominated on the left, to bursts of generalized delta-theta slowing, and to bitemporal independent spikes and sharp waves.  The interictal epileptiform abnormalities were  much more frequent after the patient was tapered off antiepileptic medications, and the interictal epileptiform abnormalities declined in occurrence on re-introduction of anti-seizure medications.  The patient had a total of 29 electrographic seizures during her 7 days of monitoring, among which 8 events were definite electroclinical seizures that reportedly represented her habitual events, with the other 21 events not clearly associated with behavioral changes; many of the latter occurred when the patient was alone, without behavioral interaction during these events.  These 29 seizures occurred during Days 3, 4, and 5, when the patient was tapered off anti-seizure medications, and the frequent seizures stopped with re-introduction of anti-seizure medications.  The most common ictal discharge localization that occurred without significant artifactual obscuration was right temporal initial maximum followed by evolution to a sustained left temporal maximum of ictal discharges.  Other seizure patterns included left temporal initial maximum which was sustained, non-localizing ictal onsets with subsequent maxima on either or both sides, and obscured ictal onsets on either or both sides.  The patient also had subjective events, usually described as  hot flashes , none of which was associated with alteration of EEG activities from baseline.    The data in this monitoring procedure are consistent with temporal lobe epilepsy.  Clinical correlation is recommended.    Brain MRI (11/06):  Impression: Very mild prominence of the right temporal horn.  Symmetrical volume and signal intensity of the hippocampi.       Neuropsych testing (3/2011):  There is no compelling evidence of intellectual decline from 7/08.  Auditory attention and word knowledge/expressive communicability are relative weaknesses, in the below average range. Graphomotor learning, verbal reasoning, and sequential analysis are low average.  Visuospatial  processing/constructional abilities are a relative strength, in the above average to superior range.  Once again there is a pattern of poor recent (long-term) memory under verbal and nonverbal conditions.  On this occasion she actually had somewhat better (normal) long-term retention of a word list, but very poor long-term retention of lengthy story passages.  Her drawing of a complex figure is poorer on this occasion, in the impaired range, due to proportional and placement errors.  Long-term retention of simpler and more complex figural material is clearly in the impaired range on this occasion.  Recognition memory for figural material is inconsistent, ranging from mildly impaired to intact.  There has been some decline in nonverbal planning (maze solving), previously above average to superior, now low average.  Inductive reasoning on the other hand has improved, though she continues to show perseverative tendencies.  Set shifting, verbal associative fluency, and nonverbal associative fluency are essentially unchanged.  She continues to show poor speeded word retrieval.  Confrontation naming remains in the mildly impaired range, without much change from the last testing.  Finger tapping speeds and fine motor dexterity are slightly faster on this occasion, and now are in the normal range.       Taken as a whole, the findings do not provide clear, consistent evidence of cognitive decline.  The findings continue to suggest multifocal cerebral dysfunction, with the dominant (presumably left) hemisphere more dysfunctional than the nondominant (right) hemisphere.  Mesial temporal brain regions bilaterally are dysfunctional.  That is consistent with a history of epilepsy, especially complex-partial epilepsy.                                                                                                             Assessment and Plan:     1. Focal epilepsy.  Since the last visit about 6 months ago, she had no seizures.  Her  did not see any seizures for the past 6 months. Her last seizure was probably in mid 2022. She is currently on keppra 1250 mg BID, Lamictal 75 mg bid, no side effects were reported.    2. Headaches.   She has been having headaches almost daily in the morning after she gets up. She is having migraines once a week, with photophobia and phonophobia, and nausea, intensity 7-8/10.     Plan:  1. Continue Keppra 1250 mg bid and Lamictal 75 mg bid.  2. Start Topamax 25 mg every day for 2 weeks, then 25 mg bid.  3. RTC in 3 months      24 min total time was spent on the day of this visit.      14 min was spent on face to face time  10 min was spent on preparation of visit to review charts and labs, ordering medications and tests, and documentation of clinical information        Again, thank you for allowing me to participate in the care of your patient.      Sincerely,    Nicola Ohara MD

## 2023-04-26 NOTE — PATIENT INSTRUCTIONS
Times of Days am pm        Medication Tablet Size Number of Tablets/Capsules Total Daily Dosage    Keppra 500 2.5 2.5       2500 mg    Lamictal 25 3 3       150 mg    Topamax 25 1 1       50 mg                                                                                               Carry this with you at all times.  CONTINUE TAKING YOUR OTHER MEDICATIONS AS PREVIOUSLY DIRECTED.      * * *Do not store medications in the bathroom.  Keep medications away from children!* * *

## 2023-04-26 NOTE — PROGRESS NOTES
"Chief Complaint:   Seizures     History of Present Illness:   Video call for follow up. 61 yo right handed woman with PMH significant for complex partial epilepsy, NOELLE, and migraine headache.  She was admitted for vEEG monitoring for epilepsy surgery workup in 2011.  She was followed by Dr. Freitas for many years.  Since the last visit about 6 months ago, she had no seizures. Her  did not see any seizures for the past 6 months. Her last seizure was probably in mid 2022. She is currently on keppra 1250 mg BID, Lamictal 75 mg bid, no side effects were reported.    She has been having headaches almost daily in the morning after she gets up. She is having migraines once a week, with photophobia and phonophobia, and nausea, intensity 7-8/10.    Recent EEG showed mildly abnormal EEG due to the presence of occasional left temporal theta slowing and rare left temporal poorly formed sharps.     Patient first started to have seizures when she was in her 30's.    She has 3 types of seizures. Type 1 is auras, which consisting of confusion, lightheadedness, occasional markie vu.  No strange smells or vision changes.  Now is almost once a day.   Type 2 is likely focal impaired seizures. \"Absence seizures\". They seizures consist of sudden loss of awareness without loss of consciousness.  She may get an aura consisting of confusion, lightheadedness, occasional markie vu before that.  The spell will last 1-3 minutes followed by confusion, lethargy, headache, followed by post-ictal fatigue. It may take her 1-2 days before she is back to her baseline.   She has no memory of the spells.  She has been told that she will appear confused, but will continue to talk, look around, and be able to move her arms/legs, etc as if she were completely aware.  No incontinence and oral trauma.  Once a week according to the daughter.  Type 3 is likely GTCs. She only had twice in her life.    Patient is having a lot of memory issues per " daughter.    Triggers: Stess.    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  No head injury with LOC.  No other CNS infections.  No strokes.  No developmental delay.  Chronically sleep deprived from untreated NOELLE.  Multiple servings of caffeine per day.        Current Outpatient Medications   Medication Sig Dispense Refill     amLODIPine (NORVASC) 5 MG tablet Take 1 tablet (5 mg) by mouth daily 90 tablet 3     atorvastatin (LIPITOR) 40 MG tablet Take 1 tablet (40 mg) by mouth daily 90 tablet 3     cetirizine (ZYRTEC) 10 MG tablet Take 10 mg by mouth every evening.       esomeprazole (NEXIUM) 40 MG DR capsule Take 40 mg by mouth       fexofenadine-pseudoePHEDrine (ALLEGRA-D)  MG 12 hr tablet Take 1 tablet by mouth every morning.       ibuprofen (ADVIL/MOTRIN) 800 MG tablet TAKE 1 TABLET BY MOUTH EVERY 8  HOURS AS NEEDED FOR MODERATE  PAIN 90 tablet 0     lamoTRIgine (LAMICTAL) 25 MG tablet TAKE 3 TABLETS BY MOUTH TWICE DAILY 180 tablet 5     levETIRAcetam (KEPPRA) 500 MG tablet TAKE 2 AND 1/2 TABLETS BY MOUTH TWICE DAILY 150 tablet 5     magnesium oxide (MAG-OX) 400 MG tablet Take 1 tablet (400 mg) by mouth daily 90 tablet 1     mometasone (NASONEX) 50 MCG/ACT nasal spray Spray 2 sprays into both nostrils daily as needed        omeprazole (PRILOSEC) 40 MG DR capsule Take 1 capsule (40 mg) by mouth daily 90 capsule 3     sertraline (ZOLOFT) 100 MG tablet Take 1 tablet (100 mg) by mouth daily 90 tablet 3     PAST AEDs:   Trileptal caused side effects.      Past Medical History:   Diagnosis Date     Apnea, sleep     reeval  Mild NOELLE     Essential hypertension 2013     Migraine headaches      Seizure disorder, complex partial (H)      Seizures (H)        Past Surgical History:   Procedure Laterality Date      SECTION      3x     COLONOSCOPY  2011    Procedure:COMBINED COLONOSCOPY, REMOVE TUMOR/POLYP/LESION BY SNARE; Surgeon:PAULA BEAN;  Location: [facility]     COLONOSCOPY N/A 4/25/2022    Procedure: COLONOSCOPY;  Surgeon: Leventhal, Thomas Michael, MD;  Location:  GI     ENDOSCOPIC RELEASE CARPAL TUNNEL  6/27/2013    Procedure: ENDOSCOPIC RELEASE CARPAL TUNNEL;  Left Endoscopic Carpal Tunnel Release;  Surgeon: Kate Najera MD;  Location:  OR     ENDOSCOPIC RELEASE CARPAL TUNNEL  8/22/2013    Procedure: ENDOSCOPIC RELEASE CARPAL TUNNEL;  Right Endoscopic Carpal Tunnel Release  ;  Surgeon: Kate Najera MD;  [facility]     HYSTERECTOMY      Ovaries intact.  No cancer.         Social History:         History   Substance Use Topics     Smoking status: Never Smoker      Smokeless tobacco: Not on file     Alcohol Use: 1.0 oz/week       2 drink(s) per week   --Works at [employer] for past 32 years.  , has 3 children.    --Drinks multiple servings of coffee per day.                Family History:    Family History            Family History   Problem Relation Age of Onset     Neurological Mother         MS      Cardiovascular Father         Aneurysm                 Allergies:    NKDA.                  Medications:      Prescriptions Prior to Admission             Prescriptions prior to admission   Medication Sig Dispense Refill     Spacer/Aero-Holding Chambers (AEROCHAMBER MV) MISC Use with albuterol inhaler         fexofenadine-pseudoephedrine (ALLEGRA-D 12 HOUR)  MG per tablet Take 1 tablet by mouth daily.         levetiracetam (KEPPRA) 500 MG tablet Take 500 mg by mouth 2 times daily.         atorvastatin (LIPITOR) 10 MG tablet Take 10 mg by mouth daily.         magnesium oxide (MAG-OXIDE) 400 MG tablet Take 1 tablet by mouth daily. Will need to see primary provider for further refills         Multiple Vitamin (MULTI-VITAMIN) per tablet Take 1 tablet by mouth daily.         mometasone (NASONEX) 50 MCG/ACT nasal spray 2 sprays by Both Nostrils route daily.         esomeprazole (NEXIUM) 40 MG capsule Take 40 mg by  mouth daily. Patient needs to see primary care provider         oxcarbazepine (TRILEPTAL) 300 MG tablet Take 1 1/2 tablets (450mg) AM and 2 tablets (600mg) PM         sertraline (ZOLOFT) 100 MG tablet Take 100 mg by mouth daily.         cetirizine (ZYRTEC) 10 MG tablet Take 10 mg by mouth daily.                Review of Systems:     Negative.           Physical Exam:          AAX 3, speech fluent and appropriate, hearing normal. No facial weakness.      Investigations:   SUMMARY OF 8 DAYS OF VIDEO-EEG MONITORING (5/2008): The interictal recording was abnormal by virtue of generalized delta-theta slowing, often with bursts of high- amplitude delta slowing and drowsiness, with occasional left temporal polymorphic delta transients and left temporal sharp waves, and with rare right temporal polymorphic delta activities and right temporal sharp waves. No electrographic seizures were recorded during this monitoring procedure. No spells with confusion or other distinct behavioral alterations were recorded during this monitoring procedure. The bitemporal and generalized slowing indicates bitemporal and generalized cerebral dysfunction, which is etiologically nonspecific. This video-EEG monitoring procedure is consistent with the interictal state of partial epilepsy, although no seizures were recorded. Clinical correlation is recommended.      SUMMARY OF 7 DAYS OF VIDEO-EEG MONITORING:  (4/2011)  The interictal EEG was abnormal due to focal independent and synchronous bitemporal delta slowing that often predominated on the left, to bursts of generalized delta-theta slowing, and to bitemporal independent spikes and sharp waves.  The interictal epileptiform abnormalities were much more frequent after the patient was tapered off antiepileptic medications, and the interictal epileptiform abnormalities declined in occurrence on re-introduction of anti-seizure medications.  The patient had a total of 29 electrographic seizures  during her 7 days of monitoring, among which 8 events were definite electroclinical seizures that reportedly represented her habitual events, with the other 21 events not clearly associated with behavioral changes; many of the latter occurred when the patient was alone, without behavioral interaction during these events.  These 29 seizures occurred during Days 3, 4, and 5, when the patient was tapered off anti-seizure medications, and the frequent seizures stopped with re-introduction of anti-seizure medications.  The most common ictal discharge localization that occurred without significant artifactual obscuration was right temporal initial maximum followed by evolution to a sustained left temporal maximum of ictal discharges.  Other seizure patterns included left temporal initial maximum which was sustained, non-localizing ictal onsets with subsequent maxima on either or both sides, and obscured ictal onsets on either or both sides.  The patient also had subjective events, usually described as  hot flashes , none of which was associated with alteration of EEG activities from baseline.    The data in this monitoring procedure are consistent with temporal lobe epilepsy.  Clinical correlation is recommended.    Brain MRI (11/06):  Impression: Very mild prominence of the right temporal horn.  Symmetrical volume and signal intensity of the hippocampi.       Neuropsych testing (3/2011):  There is no compelling evidence of intellectual decline from 7/08.  Auditory attention and word knowledge/expressive communicability are relative weaknesses, in the below average range. Graphomotor learning, verbal reasoning, and sequential analysis are low average.  Visuospatial processing/constructional abilities are a relative strength, in the above average to superior range.  Once again there is a pattern of poor recent (long-term) memory under verbal and nonverbal conditions.  On this occasion she actually had somewhat better  (normal) long-term retention of a word list, but very poor long-term retention of lengthy story passages.  Her drawing of a complex figure is poorer on this occasion, in the impaired range, due to proportional and placement errors.  Long-term retention of simpler and more complex figural material is clearly in the impaired range on this occasion.  Recognition memory for figural material is inconsistent, ranging from mildly impaired to intact.  There has been some decline in nonverbal planning (maze solving), previously above average to superior, now low average.  Inductive reasoning on the other hand has improved, though she continues to show perseverative tendencies.  Set shifting, verbal associative fluency, and nonverbal associative fluency are essentially unchanged.  She continues to show poor speeded word retrieval.  Confrontation naming remains in the mildly impaired range, without much change from the last testing.  Finger tapping speeds and fine motor dexterity are slightly faster on this occasion, and now are in the normal range.       Taken as a whole, the findings do not provide clear, consistent evidence of cognitive decline.  The findings continue to suggest multifocal cerebral dysfunction, with the dominant (presumably left) hemisphere more dysfunctional than the nondominant (right) hemisphere.  Mesial temporal brain regions bilaterally are dysfunctional.  That is consistent with a history of epilepsy, especially complex-partial epilepsy.                                                                                                             Assessment and Plan:     1. Focal epilepsy.  Since the last visit about 6 months ago, she had no seizures. Her  did not see any seizures for the past 6 months. Her last seizure was probably in mid 2022. She is currently on keppra 1250 mg BID, Lamictal 75 mg bid, no side effects were reported.    2. Headaches.   She has been having headaches almost daily  in the morning after she gets up. She is having migraines once a week, with photophobia and phonophobia, and nausea, intensity 7-8/10.     Plan:  1. Continue Keppra 1250 mg bid and Lamictal 75 mg bid.  2. Start Topamax 25 mg every day for 2 weeks, then 25 mg bid.  3. RTC in 3 months      24 min total time was spent on the day of this visit.      14 min was spent on face to face time  10 min was spent on preparation of visit to review charts and labs, ordering medications and tests, and documentation of clinical information

## 2023-04-27 ENCOUNTER — TELEPHONE (OUTPATIENT)
Dept: NEUROLOGY | Facility: CLINIC | Age: 63
End: 2023-04-27
Payer: COMMERCIAL

## 2023-04-27 NOTE — TELEPHONE ENCOUNTER
Left Voicemail (1st Attempt) and Sent Mychart (1st Attempt) for the patient to call back and schedule the following:    Appointment type: follow up  Provider: Nicola Ohara MD   Return date: 3 months  Specialty phone number: [phone]  Additional appointment(s) needed: NONE  Additonal Notes: NONE    Naomi Melton on 4/27/2023 at 10:13 AM

## 2023-05-02 ENCOUNTER — TELEPHONE (OUTPATIENT)
Dept: NEUROLOGY | Facility: CLINIC | Age: 63
End: 2023-05-02
Payer: COMMERCIAL

## 2023-05-26 NOTE — TELEPHONE ENCOUNTER
"  sertraline (ZOLOFT) 100 MG tablet  Last Written Prescription Date:  9/27/18  Last Fill Quantity: 90,   # refills: 3  Last Office Visit : 9/27/18  Future Office visit: none  \"Follow-up: Return in 1 year\"    Scheduling has been notified to contact the pt for appointment.    High med alert    Routing refill request to provider for review/approval because:  [id number] not found   High med alert  " June 20, 2023     Patient: Berto Leal   YOB: 2000   Date of Visit: 5/26/2023       To Whom it May Concern:    Berto Leal has had normal chest xray and is therefor cleared for direct patient care. If you have any questions or concerns please contact me at the number listed above.       Sincerely,       William Hulesch, MD     Medical information is confidential and cannot be disclosed without the written consent of the patient or her representative.

## 2023-08-30 ENCOUNTER — OFFICE VISIT (OUTPATIENT)
Dept: NEUROLOGY | Facility: CLINIC | Age: 63
End: 2023-08-30
Payer: COMMERCIAL

## 2023-08-30 VITALS
BODY MASS INDEX: 32.39 KG/M2 | DIASTOLIC BLOOD PRESSURE: 91 MMHG | WEIGHT: 165 LBS | RESPIRATION RATE: 16 BRPM | OXYGEN SATURATION: 94 % | HEART RATE: 83 BPM | HEIGHT: 60 IN | SYSTOLIC BLOOD PRESSURE: 135 MMHG

## 2023-08-30 DIAGNOSIS — G40.309 GENERALIZED CONVULSIVE EPILEPSY (H): ICD-10-CM

## 2023-08-30 DIAGNOSIS — G40.219 PARTIAL SYMPTOMATIC EPILEPSY WITH COMPLEX PARTIAL SEIZURES, INTRACTABLE, WITHOUT STATUS EPILEPTICUS (H): ICD-10-CM

## 2023-08-30 PROCEDURE — 99214 OFFICE O/P EST MOD 30 MIN: CPT | Performed by: PSYCHIATRY & NEUROLOGY

## 2023-08-30 RX ORDER — LEVETIRACETAM 500 MG/1
TABLET ORAL
Qty: 150 TABLET | Refills: 11 | Status: SHIPPED | OUTPATIENT
Start: 2023-08-30 | End: 2024-05-22

## 2023-08-30 RX ORDER — LAMOTRIGINE 25 MG/1
TABLET ORAL
Qty: 180 TABLET | Refills: 11 | Status: SHIPPED | OUTPATIENT
Start: 2023-08-30 | End: 2024-05-22

## 2023-08-30 ASSESSMENT — PAIN SCALES - GENERAL: PAINLEVEL: NO PAIN (0)

## 2023-08-30 ASSESSMENT — PATIENT HEALTH QUESTIONNAIRE - PHQ9: SUM OF ALL RESPONSES TO PHQ QUESTIONS 1-9: 13

## 2023-08-30 NOTE — LETTER
8/30/2023       RE: Hattie Mosher  [address] [facility] [address]       Dear Colleague,    Thank you for referring your patient, Hattie Mosher, to the [facility] [place] at [facility]. Please see a copy of my visit note below.    Chief Complaint:   Seizures     History of Present Illness:   In person follow up. 61 yo right handed woman with PMH significant for complex partial epilepsy, NOELLE, and migraine headache.  She was followed by Dr. Freitas for many years.  Since the last visit about 6 months ago, she had no seizures. Her last reported seizure was probably in mid 2022. She is currently on keppra 1250 mg BID, Lamictal 75 mg bid, no side effects were reported.  She started on Topamax 25 mg bid for 2 weeks, she felt that it did not help and she stopped it.     She has been having headaches almost daily in the morning after she gets up. She is having migraines once a week, with photophobia and phonophobia, and nausea, intensity 7-8/10.  She felt that the headaches are caused by the weather and air pollution.  She is feeling better for the past few weeks.    She was admitted for vEEG monitoring for epilepsy surgery workup in 2011. The patient had a total of 29 electrographic seizures during her 7 days of monitoring, among which 8 events were definite electroclinical seizures that reportedly represented her habitual events, with the other 21 events not clearly associated with behavioral changes; many of the latter occurred when the patient was alone, without behavioral interaction during these events.     Recent EEG showed mildly abnormal EEG due to the presence of occasional left temporal theta slowing and rare left temporal poorly formed sharps.     Patient first started to have seizures when she was in her 30's.    She has 3 types of seizures. Type 1 is auras, which consisting of confusion, lightheadedness, occasional markie vu.  No  "strange smells or vision changes.  Now is almost once a day.   Type 2 is likely focal impaired seizures. \"Absence seizures\". They seizures consist of sudden loss of awareness without loss of consciousness.  She may get an aura consisting of confusion, lightheadedness, occasional markie vu before that.  The spell will last 1-3 minutes followed by confusion, lethargy, headache, followed by post-ictal fatigue. It may take her 1-2 days before she is back to her baseline.   She has no memory of the spells.  She has been told that she will appear confused, but will continue to talk, look around, and be able to move her arms/legs, etc as if she were completely aware.  No incontinence and oral trauma.  Once a week according to the daughter.  Type 3 is likely GTCs. She only had twice in her life.    Patient is having a lot of memory issues per daughter.    Triggers: Stess.    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  No head injury with LOC.  No other CNS infections.  No strokes.  No developmental delay.  Chronically sleep deprived from untreated NOELLE.  Multiple servings of caffeine per day.        Current Outpatient Medications   Medication Sig Dispense Refill    amLODIPine (NORVASC) 5 MG tablet Take 1 tablet (5 mg) by mouth daily 90 tablet 3    atorvastatin (LIPITOR) 40 MG tablet Take 1 tablet (40 mg) by mouth daily 90 tablet 3    cetirizine (ZYRTEC) 10 MG tablet Take 10 mg by mouth every evening.      esomeprazole (NEXIUM) 40 MG DR capsule Take 40 mg by mouth      fexofenadine-pseudoePHEDrine (ALLEGRA-D)  MG 12 hr tablet Take 1 tablet by mouth every morning.      ibuprofen (ADVIL/MOTRIN) 800 MG tablet TAKE 1 TABLET BY MOUTH EVERY 8  HOURS AS NEEDED FOR MODERATE  PAIN 90 tablet 0    lamoTRIgine (LAMICTAL) 25 MG tablet TAKE 3 TABLETS BY MOUTH TWICE DAILY 180 tablet 5    levETIRAcetam (KEPPRA) 500 MG tablet TAKE 2 AND 1/2 TABLETS BY MOUTH TWICE DAILY 150 tablet 5    magnesium oxide (MAG-OX) " 400 MG tablet Take 1 tablet (400 mg) by mouth daily 90 tablet 1    mometasone (NASONEX) 50 MCG/ACT nasal spray Spray 2 sprays into both nostrils daily as needed       omeprazole (PRILOSEC) 40 MG DR capsule Take 1 capsule (40 mg) by mouth daily 90 capsule 3    sertraline (ZOLOFT) 100 MG tablet Take 1 tablet (100 mg) by mouth daily 90 tablet 3     PAST AEDs:   Trileptal caused side effects.      Past Medical History:   Diagnosis Date    Apnea, sleep 2007    reeval  Mild NOELLE    Essential hypertension 2013    Migraine headaches     Seizure disorder, complex partial (H)     Seizures (H)        Past Surgical History:   Procedure Laterality Date     SECTION      3x    COLONOSCOPY  2011    Procedure:COMBINED COLONOSCOPY, REMOVE TUMOR/POLYP/LESION BY SNARE; Surgeon:PAULA BEAN; Location: GI    COLONOSCOPY N/A 2022    Procedure: COLONOSCOPY;  Surgeon: Leventhal, Thomas Michael, MD;  Location:  GI    ENDOSCOPIC RELEASE CARPAL TUNNEL  2013    Procedure: ENDOSCOPIC RELEASE CARPAL TUNNEL;  Left Endoscopic Carpal Tunnel Release;  Surgeon: Kate Najera MD;  Location:  OR    ENDOSCOPIC RELEASE CARPAL TUNNEL  2013    Procedure: ENDOSCOPIC RELEASE CARPAL TUNNEL;  Right Endoscopic Carpal Tunnel Release  ;  Surgeon: Kate Najera MD;  Location:  OR    HYSTERECTOMY      Ovaries intact.  No cancer.         Social History:         History   Substance Use Topics    Smoking status: Never Smoker     Smokeless tobacco: Not on file    Alcohol Use: 1.0 oz/week       2 drink(s) per week   --Works at [employer] for past 32 years.  , has 3 children.    --Drinks multiple servings of coffee per day.                Family History:    Family History            Family History   Problem Relation Age of Onset    Neurological Mother         MS     Cardiovascular Father         Aneurysm                 Allergies:    NKDA.                  Medications:       Prescriptions Prior to Admission             Prescriptions prior to admission   Medication Sig Dispense Refill    Spacer/Aero-Holding Chambers (AEROCHAMBER MV) MISC Use with albuterol inhaler        fexofenadine-pseudoephedrine (ALLEGRA-D 12 HOUR)  MG per tablet Take 1 tablet by mouth daily.        levetiracetam (KEPPRA) 500 MG tablet Take 500 mg by mouth 2 times daily.        atorvastatin (LIPITOR) 10 MG tablet Take 10 mg by mouth daily.        magnesium oxide (MAG-OXIDE) 400 MG tablet Take 1 tablet by mouth daily. Will need to see primary provider for further refills        Multiple Vitamin (MULTI-VITAMIN) per tablet Take 1 tablet by mouth daily.        mometasone (NASONEX) 50 MCG/ACT nasal spray 2 sprays by Both Nostrils route daily.        esomeprazole (NEXIUM) 40 MG capsule Take 40 mg by mouth daily. Patient needs to see primary care provider        oxcarbazepine (TRILEPTAL) 300 MG tablet Take 1 1/2 tablets (450mg) AM and 2 tablets (600mg) PM        sertraline (ZOLOFT) 100 MG tablet Take 100 mg by mouth daily.        cetirizine (ZYRTEC) 10 MG tablet Take 10 mg by mouth daily.                Review of Systems:     Negative.           Physical Exam:          AAX 3, speech fluent and appropriate, hearing normal. No facial weakness.      Investigations:   SUMMARY OF 8 DAYS OF VIDEO-EEG MONITORING (5/2008): The interictal recording was abnormal by virtue of generalized delta-theta slowing, often with bursts of high- amplitude delta slowing and drowsiness, with occasional left temporal polymorphic delta transients and left temporal sharp waves, and with rare right temporal polymorphic delta activities and right temporal sharp waves. No electrographic seizures were recorded during this monitoring procedure. No spells with confusion or other distinct behavioral alterations were recorded during this monitoring procedure. The bitemporal and generalized slowing indicates bitemporal and generalized cerebral  dysfunction, which is etiologically nonspecific. This video-EEG monitoring procedure is consistent with the interictal state of partial epilepsy, although no seizures were recorded. Clinical correlation is recommended.      SUMMARY OF 7 DAYS OF VIDEO-EEG MONITORING:  (4/2011)  The interictal EEG was abnormal due to focal independent and synchronous bitemporal delta slowing that often predominated on the left, to bursts of generalized delta-theta slowing, and to bitemporal independent spikes and sharp waves.  The interictal epileptiform abnormalities were much more frequent after the patient was tapered off antiepileptic medications, and the interictal epileptiform abnormalities declined in occurrence on re-introduction of anti-seizure medications.  The patient had a total of 29 electrographic seizures during her 7 days of monitoring, among which 8 events were definite electroclinical seizures that reportedly represented her habitual events, with the other 21 events not clearly associated with behavioral changes; many of the latter occurred when the patient was alone, without behavioral interaction during these events.  These 29 seizures occurred during Days 3, 4, and 5, when the patient was tapered off anti-seizure medications, and the frequent seizures stopped with re-introduction of anti-seizure medications.  The most common ictal discharge localization that occurred without significant artifactual obscuration was right temporal initial maximum followed by evolution to a sustained left temporal maximum of ictal discharges.  Other seizure patterns included left temporal initial maximum which was sustained, non-localizing ictal onsets with subsequent maxima on either or both sides, and obscured ictal onsets on either or both sides.  The patient also had subjective events, usually described as  hot flashes , none of which was associated with alteration of EEG activities from baseline.    The data in this monitoring  procedure are consistent with temporal lobe epilepsy.  Clinical correlation is recommended.    Brain MRI (11/06):  Impression: Very mild prominence of the right temporal horn.  Symmetrical volume and signal intensity of the hippocampi.       Neuropsych testing (3/2011):  There is no compelling evidence of intellectual decline from 7/08.  Auditory attention and word knowledge/expressive communicability are relative weaknesses, in the below average range. Graphomotor learning, verbal reasoning, and sequential analysis are low average.  Visuospatial processing/constructional abilities are a relative strength, in the above average to superior range.  Once again there is a pattern of poor recent (long-term) memory under verbal and nonverbal conditions.  On this occasion she actually had somewhat better (normal) long-term retention of a word list, but very poor long-term retention of lengthy story passages.  Her drawing of a complex figure is poorer on this occasion, in the impaired range, due to proportional and placement errors.  Long-term retention of simpler and more complex figural material is clearly in the impaired range on this occasion.  Recognition memory for figural material is inconsistent, ranging from mildly impaired to intact.  There has been some decline in nonverbal planning (maze solving), previously above average to superior, now low average.  Inductive reasoning on the other hand has improved, though she continues to show perseverative tendencies.  Set shifting, verbal associative fluency, and nonverbal associative fluency are essentially unchanged.  She continues to show poor speeded word retrieval.  Confrontation naming remains in the mildly impaired range, without much change from the last testing.  Finger tapping speeds and fine motor dexterity are slightly faster on this occasion, and now are in the normal range.       Taken as a whole, the findings do not provide clear, consistent evidence of  cognitive decline.  The findings continue to suggest multifocal cerebral dysfunction, with the dominant (presumably left) hemisphere more dysfunctional than the nondominant (right) hemisphere.  Mesial temporal brain regions bilaterally are dysfunctional.  That is consistent with a history of epilepsy, especially complex-partial epilepsy.                                                                                                        Assessment and Plan:     1. Focal epilepsy.  Since the last visit about 6 months ago, she had no seizures. Her last reported seizure was probably in mid 2022. She is currently on keppra 1250 mg BID, Lamictal 75 mg bid, no side effects were reported.      2. Headaches.   She started on Topamax 25 mg bid for 2 weeks, she felt that it did not help and she stopped it.     She has been having headaches almost daily in the morning after she gets up. She is having migraines once a week, with photophobia and phonophobia, and nausea, intensity 7-8/10.  She felt that the headaches are caused by the weather and air pollution.  She is feeling better for the past few weeks.    Plan:  1. Continue Keppra 1250 mg bid and Lamictal 75 mg bid.  2. Off Topamax.  3. RTC in 3 months.    33 min total time was spent on the day of this visit.      23 min was spent on face to face time  10 min was spent on preparation of visit to review charts and labs, ordering medications and tests, and documentation of clinical information      Again, thank you for allowing me to participate in the care of your patient.      Sincerely,    Nicola Ohara MD

## 2023-08-30 NOTE — PROGRESS NOTES
"Chief Complaint:   Seizures     History of Present Illness:   In person follow up. 61 yo right handed woman with PMH significant for complex partial epilepsy, NOELLE, and migraine headache.  She was followed by Dr. Freitas for many years.  Since the last visit about 6 months ago, she had no seizures. Her last reported seizure was probably in mid 2022. She is currently on keppra 1250 mg BID, Lamictal 75 mg bid, no side effects were reported.  She started on Topamax 25 mg bid for 2 weeks, she felt that it did not help and she stopped it.     She has been having headaches almost daily in the morning after she gets up. She is having migraines once a week, with photophobia and phonophobia, and nausea, intensity 7-8/10.  She felt that the headaches are caused by the weather and air pollution.  She is feeling better for the past few weeks.    She was admitted for vEEG monitoring for epilepsy surgery workup in 2011. The patient had a total of 29 electrographic seizures during her 7 days of monitoring, among which 8 events were definite electroclinical seizures that reportedly represented her habitual events, with the other 21 events not clearly associated with behavioral changes; many of the latter occurred when the patient was alone, without behavioral interaction during these events.     Recent EEG showed mildly abnormal EEG due to the presence of occasional left temporal theta slowing and rare left temporal poorly formed sharps.     Patient first started to have seizures when she was in her 30's.    She has 3 types of seizures. Type 1 is auras, which consisting of confusion, lightheadedness, occasional markie vu.  No strange smells or vision changes.  Now is almost once a day.   Type 2 is likely focal impaired seizures. \"Absence seizures\". They seizures consist of sudden loss of awareness without loss of consciousness.  She may get an aura consisting of confusion, lightheadedness, occasional markie vu before that.  The spell will " last 1-3 minutes followed by confusion, lethargy, headache, followed by post-ictal fatigue. It may take her 1-2 days before she is back to her baseline.   She has no memory of the spells.  She has been told that she will appear confused, but will continue to talk, look around, and be able to move her arms/legs, etc as if she were completely aware.  No incontinence and oral trauma.  Once a week according to the daughter.  Type 3 is likely GTCs. She only had twice in her life.    Patient is having a lot of memory issues per daughter.    Triggers: Stess.    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  No head injury with LOC.  No other CNS infections.  No strokes.  No developmental delay.  Chronically sleep deprived from untreated NOELLE.  Multiple servings of caffeine per day.        Current Outpatient Medications   Medication Sig Dispense Refill    amLODIPine (NORVASC) 5 MG tablet Take 1 tablet (5 mg) by mouth daily 90 tablet 3    atorvastatin (LIPITOR) 40 MG tablet Take 1 tablet (40 mg) by mouth daily 90 tablet 3    cetirizine (ZYRTEC) 10 MG tablet Take 10 mg by mouth every evening.      esomeprazole (NEXIUM) 40 MG DR capsule Take 40 mg by mouth      fexofenadine-pseudoePHEDrine (ALLEGRA-D)  MG 12 hr tablet Take 1 tablet by mouth every morning.      ibuprofen (ADVIL/MOTRIN) 800 MG tablet TAKE 1 TABLET BY MOUTH EVERY 8  HOURS AS NEEDED FOR MODERATE  PAIN 90 tablet 0    lamoTRIgine (LAMICTAL) 25 MG tablet TAKE 3 TABLETS BY MOUTH TWICE DAILY 180 tablet 5    levETIRAcetam (KEPPRA) 500 MG tablet TAKE 2 AND 1/2 TABLETS BY MOUTH TWICE DAILY 150 tablet 5    magnesium oxide (MAG-OX) 400 MG tablet Take 1 tablet (400 mg) by mouth daily 90 tablet 1    mometasone (NASONEX) 50 MCG/ACT nasal spray Spray 2 sprays into both nostrils daily as needed       omeprazole (PRILOSEC) 40 MG DR capsule Take 1 capsule (40 mg) by mouth daily 90 capsule 3    sertraline (ZOLOFT) 100 MG tablet Take 1 tablet (100 mg) by  mouth daily 90 tablet 3     PAST AEDs:   Trileptal caused side effects.      Past Medical History:   Diagnosis Date    Apnea, sleep 2007    reeval  Mild NOELLE    Essential hypertension 2013    Migraine headaches     Seizure disorder, complex partial (H)     Seizures (H)        Past Surgical History:   Procedure Laterality Date     SECTION      3x    COLONOSCOPY  2011    Procedure:COMBINED COLONOSCOPY, REMOVE TUMOR/POLYP/LESION BY SNARE; Surgeon:PAULA BEAN; Location: [facility]    COLONOSCOPY N/A 2022    Procedure: COLONOSCOPY;  Surgeon: Leventhal, Thomas Michael, MD;  Location:  GI    ENDOSCOPIC RELEASE CARPAL TUNNEL  2013    Procedure: ENDOSCOPIC RELEASE CARPAL TUNNEL;  Left Endoscopic Carpal Tunnel Release;  Surgeon: Kate Najera MD;  [facility]    ENDOSCOPIC RELEASE CARPAL TUNNEL  2013    Procedure: ENDOSCOPIC RELEASE CARPAL TUNNEL;  Right Endoscopic Carpal Tunnel Release  ;  Surgeon: Kate Najera MD;  [facility]    HYSTERECTOMY      Ovaries intact.  No cancer.         Social History:         History   Substance Use Topics    Smoking status: Never Smoker     Smokeless tobacco: Not on file    Alcohol Use: 1.0 oz/week       2 drink(s) per week   --Works at [employer] for past 32 years.  , has 3 children.    --Drinks multiple servings of coffee per day.                Family History:    Family History            Family History   Problem Relation Age of Onset    Neurological Mother         MS     Cardiovascular Father         Aneurysm                 Allergies:    NKDA.                  Medications:      Prescriptions Prior to Admission             Prescriptions prior to admission   Medication Sig Dispense Refill    Spacer/Aero-Holding Chambers (AEROCHAMBER MV) MISC Use with albuterol inhaler        fexofenadine-pseudoephedrine (ALLEGRA-D 12 HOUR)  MG per tablet Take 1 tablet by mouth daily.        levetiracetam (KEPPRA)  500 MG tablet Take 500 mg by mouth 2 times daily.        atorvastatin (LIPITOR) 10 MG tablet Take 10 mg by mouth daily.        magnesium oxide (MAG-OXIDE) 400 MG tablet Take 1 tablet by mouth daily. Will need to see primary provider for further refills        Multiple Vitamin (MULTI-VITAMIN) per tablet Take 1 tablet by mouth daily.        mometasone (NASONEX) 50 MCG/ACT nasal spray 2 sprays by Both Nostrils route daily.        esomeprazole (NEXIUM) 40 MG capsule Take 40 mg by mouth daily. Patient needs to see primary care provider        oxcarbazepine (TRILEPTAL) 300 MG tablet Take 1 1/2 tablets (450mg) AM and 2 tablets (600mg) PM        sertraline (ZOLOFT) 100 MG tablet Take 100 mg by mouth daily.        cetirizine (ZYRTEC) 10 MG tablet Take 10 mg by mouth daily.                Review of Systems:     Negative.           Physical Exam:          AAX 3, speech fluent and appropriate, hearing normal. No facial weakness.      Investigations:   SUMMARY OF 8 DAYS OF VIDEO-EEG MONITORING (5/2008): The interictal recording was abnormal by virtue of generalized delta-theta slowing, often with bursts of high- amplitude delta slowing and drowsiness, with occasional left temporal polymorphic delta transients and left temporal sharp waves, and with rare right temporal polymorphic delta activities and right temporal sharp waves. No electrographic seizures were recorded during this monitoring procedure. No spells with confusion or other distinct behavioral alterations were recorded during this monitoring procedure. The bitemporal and generalized slowing indicates bitemporal and generalized cerebral dysfunction, which is etiologically nonspecific. This video-EEG monitoring procedure is consistent with the interictal state of partial epilepsy, although no seizures were recorded. Clinical correlation is recommended.      SUMMARY OF 7 DAYS OF VIDEO-EEG MONITORING:  (4/2011)  The interictal EEG was abnormal due to focal independent and  synchronous bitemporal delta slowing that often predominated on the left, to bursts of generalized delta-theta slowing, and to bitemporal independent spikes and sharp waves.  The interictal epileptiform abnormalities were much more frequent after the patient was tapered off antiepileptic medications, and the interictal epileptiform abnormalities declined in occurrence on re-introduction of anti-seizure medications.  The patient had a total of 29 electrographic seizures during her 7 days of monitoring, among which 8 events were definite electroclinical seizures that reportedly represented her habitual events, with the other 21 events not clearly associated with behavioral changes; many of the latter occurred when the patient was alone, without behavioral interaction during these events.  These 29 seizures occurred during Days 3, 4, and 5, when the patient was tapered off anti-seizure medications, and the frequent seizures stopped with re-introduction of anti-seizure medications.  The most common ictal discharge localization that occurred without significant artifactual obscuration was right temporal initial maximum followed by evolution to a sustained left temporal maximum of ictal discharges.  Other seizure patterns included left temporal initial maximum which was sustained, non-localizing ictal onsets with subsequent maxima on either or both sides, and obscured ictal onsets on either or both sides.  The patient also had subjective events, usually described as  hot flashes , none of which was associated with alteration of EEG activities from baseline.    The data in this monitoring procedure are consistent with temporal lobe epilepsy.  Clinical correlation is recommended.    Brain MRI (11/06):  Impression: Very mild prominence of the right temporal horn.  Symmetrical volume and signal intensity of the hippocampi.       Neuropsych testing (3/2011):  There is no compelling evidence of intellectual decline from 7/08.   Auditory attention and word knowledge/expressive communicability are relative weaknesses, in the below average range. Graphomotor learning, verbal reasoning, and sequential analysis are low average.  Visuospatial processing/constructional abilities are a relative strength, in the above average to superior range.  Once again there is a pattern of poor recent (long-term) memory under verbal and nonverbal conditions.  On this occasion she actually had somewhat better (normal) long-term retention of a word list, but very poor long-term retention of lengthy story passages.  Her drawing of a complex figure is poorer on this occasion, in the impaired range, due to proportional and placement errors.  Long-term retention of simpler and more complex figural material is clearly in the impaired range on this occasion.  Recognition memory for figural material is inconsistent, ranging from mildly impaired to intact.  There has been some decline in nonverbal planning (maze solving), previously above average to superior, now low average.  Inductive reasoning on the other hand has improved, though she continues to show perseverative tendencies.  Set shifting, verbal associative fluency, and nonverbal associative fluency are essentially unchanged.  She continues to show poor speeded word retrieval.  Confrontation naming remains in the mildly impaired range, without much change from the last testing.  Finger tapping speeds and fine motor dexterity are slightly faster on this occasion, and now are in the normal range.       Taken as a whole, the findings do not provide clear, consistent evidence of cognitive decline.  The findings continue to suggest multifocal cerebral dysfunction, with the dominant (presumably left) hemisphere more dysfunctional than the nondominant (right) hemisphere.  Mesial temporal brain regions bilaterally are dysfunctional.  That is consistent with a history of epilepsy, especially complex-partial epilepsy.                                                                                                              Assessment and Plan:     1. Focal epilepsy.  Since the last visit about 6 months ago, she had no seizures. Her last reported seizure was probably in mid 2022. She is currently on keppra 1250 mg BID, Lamictal 75 mg bid, no side effects were reported.      2. Headaches.   She started on Topamax 25 mg bid for 2 weeks, she felt that it did not help and she stopped it.     She has been having headaches almost daily in the morning after she gets up. She is having migraines once a week, with photophobia and phonophobia, and nausea, intensity 7-8/10.  She felt that the headaches are caused by the weather and air pollution.  She is feeling better for the past few weeks.    Plan:  1. Continue Keppra 1250 mg bid and Lamictal 75 mg bid.  2. Off Topamax.  3. RTC in 3 months.      33 min total time was spent on the day of this visit.      23 min was spent on face to face time  10 min was spent on preparation of visit to review charts and labs, ordering medications and tests, and documentation of clinical information

## 2023-10-25 ENCOUNTER — TELEPHONE (OUTPATIENT)
Dept: NEUROLOGY | Facility: CLINIC | Age: 63
End: 2023-10-25
Payer: COMMERCIAL

## 2023-10-27 ENCOUNTER — TELEPHONE (OUTPATIENT)
Dept: NEUROLOGY | Facility: CLINIC | Age: 63
End: 2023-10-27
Payer: COMMERCIAL

## 2023-10-27 DIAGNOSIS — G43.919 INTRACTABLE MIGRAINE WITHOUT STATUS MIGRAINOSUS, UNSPECIFIED MIGRAINE TYPE: ICD-10-CM

## 2023-10-30 NOTE — TELEPHONE ENCOUNTER
Topiramate 25 MG Oral Tablet       TAKE 1 TABLET BY MOUTH ONCE  DAILY FOR 2 WEEKS, THEN 1 TABLET TWICE DAILY  Associated diagnosis:Intractable migraine without status migrainosus, unspecified migraine type  Last Written Prescription Date:  4-26-23  Last Fill Quantity: 60,   # refills: 5  Last Office Visit : 8-30-23  Future Office visit:  none    Routing refill request to provider for review/approval because:  Med not on protocol  Adjusting dosage

## 2023-11-05 ENCOUNTER — HEALTH MAINTENANCE LETTER (OUTPATIENT)
Age: 63
End: 2023-11-05

## 2023-11-07 RX ORDER — TOPIRAMATE 25 MG/1
25 TABLET, FILM COATED ORAL 2 TIMES DAILY
Qty: 180 TABLET | Refills: 3 | Status: SHIPPED | OUTPATIENT
Start: 2023-11-07

## 2023-11-20 ENCOUNTER — TRANSFERRED RECORDS (OUTPATIENT)
Dept: MULTI SPECIALTY CLINIC | Facility: CLINIC | Age: 63
End: 2023-11-20

## 2023-12-18 ENCOUNTER — TELEPHONE (OUTPATIENT)
Dept: FAMILY MEDICINE | Facility: CLINIC | Age: 63
End: 2023-12-18
Payer: COMMERCIAL

## 2024-02-15 DIAGNOSIS — K21.9 GASTROESOPHAGEAL REFLUX DISEASE WITHOUT ESOPHAGITIS: ICD-10-CM

## 2024-02-20 RX ORDER — OMEPRAZOLE 40 MG/1
40 CAPSULE, DELAYED RELEASE ORAL DAILY
Qty: 90 CAPSULE | Refills: 1 | Status: SHIPPED | OUTPATIENT
Start: 2024-02-20 | End: 2024-04-17

## 2024-03-14 ENCOUNTER — OFFICE VISIT (OUTPATIENT)
Dept: OPHTHALMOLOGY | Facility: CLINIC | Age: 64
End: 2024-03-14
Attending: OPHTHALMOLOGY
Payer: COMMERCIAL

## 2024-03-14 DIAGNOSIS — B39.9 OCULAR HISTOPLASMOSIS SYNDROME OF BOTH EYES: Primary | ICD-10-CM

## 2024-03-14 DIAGNOSIS — H52.4 MYOPIA OF BOTH EYES WITH ASTIGMATISM AND PRESBYOPIA: ICD-10-CM

## 2024-03-14 DIAGNOSIS — H52.13 MYOPIA OF BOTH EYES WITH ASTIGMATISM AND PRESBYOPIA: ICD-10-CM

## 2024-03-14 DIAGNOSIS — H32 OCULAR HISTOPLASMOSIS SYNDROME OF BOTH EYES: Primary | ICD-10-CM

## 2024-03-14 DIAGNOSIS — H52.203 MYOPIA OF BOTH EYES WITH ASTIGMATISM AND PRESBYOPIA: ICD-10-CM

## 2024-03-14 PROCEDURE — 92014 COMPRE OPH EXAM EST PT 1/>: CPT | Mod: GC | Performed by: OPHTHALMOLOGY

## 2024-03-14 PROCEDURE — 99207 FUNDUS PHOTOS OU (BOTH EYES): CPT | Mod: 26 | Performed by: OPHTHALMOLOGY

## 2024-03-14 PROCEDURE — 92133 CPTRZD OPH DX IMG PST SGM ON: CPT | Mod: 26 | Performed by: OPHTHALMOLOGY

## 2024-03-14 PROCEDURE — 99213 OFFICE O/P EST LOW 20 MIN: CPT | Performed by: OPHTHALMOLOGY

## 2024-03-14 PROCEDURE — 92250 FUNDUS PHOTOGRAPHY W/I&R: CPT | Performed by: OPHTHALMOLOGY

## 2024-03-14 ASSESSMENT — CONF VISUAL FIELD
OD_SUPERIOR_TEMPORAL_RESTRICTION: 0
OD_INFERIOR_NASAL_RESTRICTION: 0
OS_SUPERIOR_NASAL_RESTRICTION: 0
OD_NORMAL: 1
OS_SUPERIOR_TEMPORAL_RESTRICTION: 0
OS_INFERIOR_NASAL_RESTRICTION: 0
OD_INFERIOR_TEMPORAL_RESTRICTION: 0
OS_INFERIOR_TEMPORAL_RESTRICTION: 0
OS_NORMAL: 1
OD_SUPERIOR_NASAL_RESTRICTION: 0

## 2024-03-14 ASSESSMENT — CUP TO DISC RATIO
OS_RATIO: 0.25
OD_RATIO: 0.15

## 2024-03-14 ASSESSMENT — VISUAL ACUITY
OS_CC+: -1
METHOD: SNELLEN - LINEAR
OD_CC+: -2
OD_CC: 20/20
OS_CC: 20/20
CORRECTION_TYPE: GLASSES

## 2024-03-14 ASSESSMENT — REFRACTION_MANIFEST
OS_CYLINDER: +1.75
OD_SPHERE: -4.00
OS_AXIS: 094
OD_AXIS: 064
OS_SPHERE: -5.25
OD_CYLINDER: +1.25
OS_ADD: +2.75
OD_ADD: +2.75

## 2024-03-14 ASSESSMENT — REFRACTION_WEARINGRX
OS_CYLINDER: +1.50
SPECS_TYPE: PAL
OD_AXIS: 053
OD_ADD: +2.50
OD_SPHERE: -4.00
OS_ADD: +2.50
OS_SPHERE: -5.00
OS_AXIS: 098
OD_CYLINDER: +1.00

## 2024-03-14 ASSESSMENT — EXTERNAL EXAM - RIGHT EYE: OD_EXAM: NORMAL

## 2024-03-14 ASSESSMENT — TONOMETRY
OS_IOP_MMHG: 11
OD_IOP_MMHG: 10
IOP_METHOD: TONOPEN

## 2024-03-14 ASSESSMENT — SLIT LAMP EXAM - LIDS
COMMENTS: NORMAL
COMMENTS: NORMAL

## 2024-03-14 ASSESSMENT — EXTERNAL EXAM - LEFT EYE: OS_EXAM: NORMAL

## 2024-03-14 NOTE — PROGRESS NOTES
Chief Complaint(s) and History of Present Illness(es)     Annual Eye Exam            Associated symptoms: floaters and itching.  Negative for dryness, eye   pain and flashes    Treatments tried: no treatments    Pain scale: 0/10    Comments: Here for annual eye exam          Comments    Pt states her distance vision has gotten gradually blurrier.  Denies eye pain, but gets itching each eye she thinks are due to   allergies.   No new flashes or floaters.   Not using any eyedrops.     Salvatore Stephane 3:11 PM March 14, 2024            Review of systems for the eyes was negative other than the pertinent positives/negatives listed in the HPI.      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 Myopia of both eyes with astigmatism and presbyopia         Pt feels like her vision is a little more blurry in both eyes, she has some glare but denies halos  Her eyes feel itchy sometimes along with her other allergy symptoms and typically responds to oral allergy medications, advised her she can try over the counter allergy drops as needed     No significant change in Mrx today  Return precautions reviewed     Patient disposition:   Return in about 1 year (around 3/14/2025) for VTD, MRx.    Kristen August MD  Ophthalmology Resident, PGY-4  [facility]       Attending Physician Attestation:  Complete documentation of historical and exam elements from today's encounter can be found in the full encounter summary report (not reduplicated in this progress note).  I personally obtained the chief complaint(s) and history of present illness.  I confirmed and edited as necessary the review of systems, past medical/surgical history, family history, social history, and examination findings as documented by others; and I examined the patient myself.  I personally reviewed the relevant tests, images, and reports as documented above.  I  formulated and edited as necessary the assessment and plan and discussed the findings and management plan with the patient and family. Attending Physician Image/Tesing Attestation: I personally reviewed the ophthalmic test(s) associated with this encounter, agree with the interpretation(s) as documented by the resident/fellow, and have edited the corresponding report(s) as necessary.  . - Kristofer Tello MD

## 2024-03-14 NOTE — NURSING NOTE
Chief Complaints and History of Present Illnesses   Patient presents with    Annual Eye Exam     Here for annual eye exam     Chief Complaint(s) and History of Present Illness(es)       Annual Eye Exam              Associated symptoms: floaters and itching.  Negative for dryness, eye pain and flashes    Treatments tried: no treatments    Pain scale: 0/10    Comments: Here for annual eye exam              Comments    Pt states her distance vision has gotten gradually blurrier.  Denies eye pain, but gets itching each eye she thinks are due to allergies.   No new flashes or floaters.   Not using any eyedrops.     Salvatore Calderón 3:11 PM March 14, 2024

## 2024-03-26 DIAGNOSIS — I10 BENIGN ESSENTIAL HYPERTENSION: ICD-10-CM

## 2024-03-26 DIAGNOSIS — F33.1 MAJOR DEPRESSIVE DISORDER, RECURRENT EPISODE, MODERATE (H): ICD-10-CM

## 2024-03-26 DIAGNOSIS — E78.00 HIGH BLOOD CHOLESTEROL: ICD-10-CM

## 2024-04-01 RX ORDER — ATORVASTATIN CALCIUM 40 MG/1
40 TABLET, FILM COATED ORAL DAILY
Qty: 90 TABLET | Refills: 0 | Status: SHIPPED | OUTPATIENT
Start: 2024-04-01 | End: 2024-04-17

## 2024-04-01 RX ORDER — SERTRALINE HYDROCHLORIDE 100 MG/1
100 TABLET, FILM COATED ORAL DAILY
Qty: 90 TABLET | Refills: 0 | Status: SHIPPED | OUTPATIENT
Start: 2024-04-01 | End: 2024-04-17

## 2024-04-01 RX ORDER — AMLODIPINE BESYLATE 5 MG/1
5 TABLET ORAL DAILY
Qty: 90 TABLET | Refills: 0 | Status: SHIPPED | OUTPATIENT
Start: 2024-04-01 | End: 2024-04-17

## 2024-04-01 NOTE — TELEPHONE ENCOUNTER
LCV:4/14/2023  [facility] [place]  NCV: 4-17-24  Overdue:PHQ9,BP documentation ( FYI to clinic)  RF 90 day

## 2024-04-10 SDOH — HEALTH STABILITY: PHYSICAL HEALTH: ON AVERAGE, HOW MANY DAYS PER WEEK DO YOU ENGAGE IN MODERATE TO STRENUOUS EXERCISE (LIKE A BRISK WALK)?: 5 DAYS

## 2024-04-10 SDOH — HEALTH STABILITY: PHYSICAL HEALTH: ON AVERAGE, HOW MANY MINUTES DO YOU ENGAGE IN EXERCISE AT THIS LEVEL?: 20 MIN

## 2024-04-10 ASSESSMENT — SOCIAL DETERMINANTS OF HEALTH (SDOH): HOW OFTEN DO YOU GET TOGETHER WITH FRIENDS OR RELATIVES?: ONCE A WEEK

## 2024-04-17 ENCOUNTER — OFFICE VISIT (OUTPATIENT)
Dept: FAMILY MEDICINE | Facility: CLINIC | Age: 64
End: 2024-04-17
Payer: COMMERCIAL

## 2024-04-17 ENCOUNTER — LAB (OUTPATIENT)
Dept: LAB | Facility: CLINIC | Age: 64
End: 2024-04-17
Payer: COMMERCIAL

## 2024-04-17 VITALS
HEART RATE: 72 BPM | BODY MASS INDEX: 32.38 KG/M2 | HEIGHT: 60 IN | OXYGEN SATURATION: 98 % | SYSTOLIC BLOOD PRESSURE: 163 MMHG | WEIGHT: 164.9 LBS | DIASTOLIC BLOOD PRESSURE: 78 MMHG

## 2024-04-17 DIAGNOSIS — Z00.00 ROUTINE GENERAL MEDICAL EXAMINATION AT A HEALTH CARE FACILITY: ICD-10-CM

## 2024-04-17 DIAGNOSIS — I10 BENIGN ESSENTIAL HYPERTENSION: ICD-10-CM

## 2024-04-17 DIAGNOSIS — F33.1 MAJOR DEPRESSIVE DISORDER, RECURRENT EPISODE, MODERATE (H): ICD-10-CM

## 2024-04-17 DIAGNOSIS — K21.9 GASTROESOPHAGEAL REFLUX DISEASE WITHOUT ESOPHAGITIS: ICD-10-CM

## 2024-04-17 DIAGNOSIS — Z11.4 SCREENING FOR HIV (HUMAN IMMUNODEFICIENCY VIRUS): Primary | ICD-10-CM

## 2024-04-17 DIAGNOSIS — E78.00 HIGH BLOOD CHOLESTEROL: ICD-10-CM

## 2024-04-17 DIAGNOSIS — Z11.4 SCREENING FOR HIV (HUMAN IMMUNODEFICIENCY VIRUS): ICD-10-CM

## 2024-04-17 LAB
ALBUMIN SERPL BCG-MCNC: 4.4 G/DL (ref 3.5–5.2)
ALP SERPL-CCNC: 101 U/L (ref 40–150)
ALT SERPL W P-5'-P-CCNC: 24 U/L (ref 0–50)
ANION GAP SERPL CALCULATED.3IONS-SCNC: 10 MMOL/L (ref 7–15)
AST SERPL W P-5'-P-CCNC: 27 U/L (ref 0–45)
BASOPHILS # BLD AUTO: 0.1 10E3/UL (ref 0–0.2)
BASOPHILS NFR BLD AUTO: 1 %
BILIRUB SERPL-MCNC: 0.3 MG/DL
BUN SERPL-MCNC: 10.8 MG/DL (ref 8–23)
CALCIUM SERPL-MCNC: 9.7 MG/DL (ref 8.8–10.2)
CHLORIDE SERPL-SCNC: 103 MMOL/L (ref 98–107)
CHOLEST SERPL-MCNC: 207 MG/DL
CREAT SERPL-MCNC: 0.73 MG/DL (ref 0.51–0.95)
DEPRECATED HCO3 PLAS-SCNC: 27 MMOL/L (ref 22–29)
EGFRCR SERPLBLD CKD-EPI 2021: >90 ML/MIN/1.73M2
EOSINOPHIL # BLD AUTO: 0.4 10E3/UL (ref 0–0.7)
EOSINOPHIL NFR BLD AUTO: 4 %
ERYTHROCYTE [DISTWIDTH] IN BLOOD BY AUTOMATED COUNT: 12.8 % (ref 10–15)
FASTING STATUS PATIENT QL REPORTED: YES
GLUCOSE SERPL-MCNC: 101 MG/DL (ref 70–99)
HCT VFR BLD AUTO: 41.8 % (ref 35–47)
HDLC SERPL-MCNC: 71 MG/DL
HGB BLD-MCNC: 13.6 G/DL (ref 11.7–15.7)
IMM GRANULOCYTES # BLD: 0 10E3/UL
IMM GRANULOCYTES NFR BLD: 0 %
LDLC SERPL CALC-MCNC: 112 MG/DL
LYMPHOCYTES # BLD AUTO: 4.1 10E3/UL (ref 0.8–5.3)
LYMPHOCYTES NFR BLD AUTO: 43 %
MCH RBC QN AUTO: 30.3 PG (ref 26.5–33)
MCHC RBC AUTO-ENTMCNC: 32.5 G/DL (ref 31.5–36.5)
MCV RBC AUTO: 93 FL (ref 78–100)
MONOCYTES # BLD AUTO: 0.8 10E3/UL (ref 0–1.3)
MONOCYTES NFR BLD AUTO: 8 %
NEUTROPHILS # BLD AUTO: 4.3 10E3/UL (ref 1.6–8.3)
NEUTROPHILS NFR BLD AUTO: 44 %
NONHDLC SERPL-MCNC: 136 MG/DL
NRBC # BLD AUTO: 0 10E3/UL
NRBC BLD AUTO-RTO: 0 /100
PLATELET # BLD AUTO: 322 10E3/UL (ref 150–450)
POTASSIUM SERPL-SCNC: 4.3 MMOL/L (ref 3.4–5.3)
PROT SERPL-MCNC: 7.8 G/DL (ref 6.4–8.3)
RBC # BLD AUTO: 4.49 10E6/UL (ref 3.8–5.2)
SODIUM SERPL-SCNC: 140 MMOL/L (ref 135–145)
TRIGL SERPL-MCNC: 118 MG/DL
WBC # BLD AUTO: 9.6 10E3/UL (ref 4–11)

## 2024-04-17 PROCEDURE — 85025 COMPLETE CBC W/AUTO DIFF WBC: CPT | Performed by: PATHOLOGY

## 2024-04-17 PROCEDURE — 87389 HIV-1 AG W/HIV-1&-2 AB AG IA: CPT | Performed by: FAMILY MEDICINE

## 2024-04-17 PROCEDURE — 90480 ADMN SARSCOV2 VAC 1/ONLY CMP: CPT | Performed by: FAMILY MEDICINE

## 2024-04-17 PROCEDURE — 99397 PER PM REEVAL EST PAT 65+ YR: CPT | Mod: 25 | Performed by: FAMILY MEDICINE

## 2024-04-17 PROCEDURE — 80053 COMPREHEN METABOLIC PANEL: CPT | Performed by: PATHOLOGY

## 2024-04-17 PROCEDURE — 91320 SARSCV2 VAC 30MCG TRS-SUC IM: CPT | Performed by: FAMILY MEDICINE

## 2024-04-17 PROCEDURE — 80061 LIPID PANEL: CPT | Performed by: PATHOLOGY

## 2024-04-17 PROCEDURE — 36415 COLL VENOUS BLD VENIPUNCTURE: CPT | Performed by: PATHOLOGY

## 2024-04-17 PROCEDURE — 99000 SPECIMEN HANDLING OFFICE-LAB: CPT | Performed by: PATHOLOGY

## 2024-04-17 RX ORDER — ATORVASTATIN CALCIUM 40 MG/1
40 TABLET, FILM COATED ORAL DAILY
Qty: 90 TABLET | Refills: 3 | Status: SHIPPED | OUTPATIENT
Start: 2024-04-17

## 2024-04-17 RX ORDER — SERTRALINE HYDROCHLORIDE 100 MG/1
100 TABLET, FILM COATED ORAL DAILY
Qty: 90 TABLET | Refills: 3 | Status: SHIPPED | OUTPATIENT
Start: 2024-04-17

## 2024-04-17 RX ORDER — AMLODIPINE BESYLATE 5 MG/1
5 TABLET ORAL DAILY
Qty: 90 TABLET | Refills: 3 | Status: SHIPPED | OUTPATIENT
Start: 2024-04-17

## 2024-04-17 RX ORDER — OMEPRAZOLE 40 MG/1
40 CAPSULE, DELAYED RELEASE ORAL DAILY
Qty: 90 CAPSULE | Refills: 3 | Status: SHIPPED | OUTPATIENT
Start: 2024-04-17

## 2024-04-17 ASSESSMENT — PATIENT HEALTH QUESTIONNAIRE - PHQ9
SUM OF ALL RESPONSES TO PHQ QUESTIONS 1-9: 3
10. IF YOU CHECKED OFF ANY PROBLEMS, HOW DIFFICULT HAVE THESE PROBLEMS MADE IT FOR YOU TO DO YOUR WORK, TAKE CARE OF THINGS AT HOME, OR GET ALONG WITH OTHER PEOPLE: NOT DIFFICULT AT ALL
SUM OF ALL RESPONSES TO PHQ QUESTIONS 1-9: 3

## 2024-04-17 NOTE — PATIENT INSTRUCTIONS
Preventive Care Advice   This is general advice given by our system to help you stay healthy. However, your care team may have specific advice just for you. Please talk to your care team about your preventive care needs.  Nutrition  Eat 5 or more servings of fruits and vegetables each day.  Try wheat bread, brown rice and whole grain pasta (instead of white bread, rice, and pasta).  Get enough calcium and vitamin D. Check the label on foods and aim for 100% of the RDA (recommended daily allowance).  Lifestyle  Exercise at least 150 minutes each week   (30 minutes a day, 5 days a week).  Do muscle strengthening activities 2 days a week. These help control your weight and prevent disease.  No smoking.  Wear sunscreen to prevent skin cancer.  Have a dental exam and cleaning every 6 months.  Yearly exams  See your health care team every year to talk about:  Any changes in your health.  Any medicines your care team has prescribed.  Preventive care, family planning, and ways to prevent chronic diseases.  Shots (vaccines)   HPV shots (up to age 26), if you've never had them before.  Hepatitis B shots (up to age 59), if you've never had them before.  COVID-19 shot: Get this shot when it's due.  Flu shot: Get a flu shot every year.  Tetanus shot: Get a tetanus shot every 10 years.  Pneumococcal, hepatitis A, and RSV shots: Ask your care team if you need these based on your risk.  Shingles shot (for age 50 and up).  General health tests  Diabetes screening:  Starting at age 35, Get screened for diabetes at least every 3 years.  If you are younger than age 35, ask your care team if you should be screened for diabetes.  Cholesterol test: At age 39, start having a cholesterol test every 5 years, or more often if advised.  Bone density scan (DEXA): At age 50, ask your care team if you should have this scan for osteoporosis (brittle bones).  Hepatitis C: Get tested at least once in your life.  STIs (sexually transmitted  infections)  Before age 24: Ask your care team if you should be screened for STIs.  After age 24: Get screened for STIs if you're at risk. You are at risk for STIs (including HIV) if:  You are sexually active with more than one person.  You don't use condoms every time.  You or a partner was diagnosed with a sexually transmitted infection.  If you are at risk for HIV, ask about PrEP medicine to prevent HIV.  Get tested for HIV at least once in your life, whether you are at risk for HIV or not.  Cancer screening tests  Cervical cancer screening: If you have a cervix, begin getting regular cervical cancer screening tests at age 21. Most people who have regular screenings with normal results can stop after age 65. Talk about this with your provider.  Breast cancer scan (mammogram): If you've ever had breasts, begin having regular mammograms starting at age 40. This is a scan to check for breast cancer.  Colon cancer screening: It is important to start screening for colon cancer at age 45.  Have a colonoscopy test every 10 years (or more often if you're at risk) Or, ask your provider about stool tests like a FIT test every year or Cologuard test every 3 years.  To learn more about your testing options, visit: [email or web address].  For help making a decision, visit: https://bit.ly/vy30493.  Prostate cancer screening test: If you have a prostate and are age 55 to 69, ask your provider if you would benefit from a yearly prostate cancer screening test.  Lung cancer screening: If you are a current or former smoker age 50 to 80, ask your care team if ongoing lung cancer screenings are right for you.  For informational purposes only. Not to replace the advice of your health care provider. Copyright   2023 [employer]. All rights reserved. Clinically reviewed by the [facility]. [employer] [id number] - REV 01/24.    Preventing Falls: Care Instructions  Injuries and health  problems such as trouble walking or poor eyesight can increase your risk of falling. So can some medicines. But there are things you can do to help prevent falls. You can exercise to get stronger. You can also arrange your home to make it safer.    Talk to your doctor about the medicines you take. Ask if any of them increase the risk of falls and whether they can be changed or stopped.   Try to exercise regularly. It can help improve your strength and balance. This can help lower your risk of falling.     Practice fall safety and prevention.    Wear low-heeled shoes that fit well and give your feet good support. Talk to your doctor if you have foot problems that make this hard.  Carry a cellphone or wear a medical alert device that you can use to call for help.  Use stepladders instead of chairs to reach high objects. Don't climb if you're at risk for falls. Ask for help, if needed.  Wear the correct eyeglasses, if you need them.    Make your home safer.    Remove rugs, cords, clutter, and furniture from walkways.  Keep your house well lit. Use night-lights in hallways and bathrooms.  Install and use sturdy handrails on stairways.  Wear nonskid footwear, even inside. Don't walk barefoot or in socks without shoes.    Be safe outside.    Use handrails, curb cuts, and ramps whenever possible.  Keep your hands free by using a shoulder bag or backpack.  Try to walk in well-lit areas. Watch out for uneven ground, changes in pavement, and debris.  Be careful in the winter. Walk on the grass or gravel when sidewalks are slippery. Use de-icer on steps and walkways. Add non-slip devices to shoes.    Put grab bars and nonskid mats in your shower or tub and near the toilet. Try to use a shower chair or bath bench when bathing.   Get into a tub or shower by putting in your weaker leg first. Get out with your strong side first. Have a phone or medical alert device in the bathroom with you.   Where can you learn more?  Go to  "https://www.[employer].net/patiented  Enter G117 in the search box to learn more about \"Preventing Falls: Care Instructions.\"  Current as of: July 17, 2023               Content Version: 14.0    7207-9270 [employer].   Care instructions adapted under license by your healthcare professional. If you have questions about a medical condition or this instruction, always ask your healthcare professional. [employer] disclaims any warranty or liability for your use of this information.      Hearing Loss: Care Instructions  Overview     Hearing loss is a sudden or slow decrease in how well you hear. It can range from slight to profound. Permanent hearing loss can occur with aging. It also can happen when you are exposed long-term to loud noise. Examples include listening to loud music, riding motorcycles, or being around other loud machines.  Hearing loss can affect your work and home life. It can make you feel lonely or depressed. You may feel that you have lost your independence. But hearing aids and other devices can help you hear better and feel connected to others.  Follow-up care is a key part of your treatment and safety. Be sure to make and go to all appointments, and call your doctor if you are having problems. It's also a good idea to know your test results and keep a list of the medicines you take.  How can you care for yourself at home?  Avoid loud noises whenever possible. This helps keep your hearing from getting worse.  Always wear hearing protection around loud noises.  Wear a hearing aid as directed.  A professional can help you pick a hearing aid that will work best for you.  You can also get hearing aids over the counter for mild to moderate hearing loss.  Have hearing tests as your doctor suggests. They can show whether your hearing has changed. Your hearing aid may need to be adjusted.  Use other devices as needed. These may include:  Telephone amplifiers and hearing aids that " "can connect to a television, stereo, radio, or microphone.  Devices that use lights or vibrations. These alert you to the doorbell, a ringing telephone, or a baby monitor.  Television closed-captioning. This shows the words at the bottom of the screen. Most new TVs can do this.  TTY (text telephone). This lets you type messages back and forth on the telephone instead of talking or listening. These devices are also called TDD. When messages are typed on the keyboard, they are sent over the phone line to a receiving TTY. The message is shown on a monitor.  Use text messaging, social media, and email if it is hard for you to communicate by telephone.  Try to learn a listening technique called speechreading. It is not lipreading. You pay attention to people's gestures, expressions, posture, and tone of voice. These clues can help you understand what a person is saying. Face the person you are talking to, and have them face you. Make sure the lighting is good. You need to see the other person's face clearly.  Think about counseling if you need help to adjust to your hearing loss.  When should you call for help?  Watch closely for changes in your health, and be sure to contact your doctor if:    You think your hearing is getting worse.     You have new symptoms, such as dizziness or nausea.   Where can you learn more?  Go to https://www.[employer].net/patiented  Enter R798 in the search box to learn more about \"Hearing Loss: Care Instructions.\"  Current as of: September 27, 2023               Content Version: 14.0    9873-9776 [employer].   Care instructions adapted under license by your healthcare professional. If you have questions about a medical condition or this instruction, always ask your healthcare professional. [employer] disclaims any warranty or liability for your use of this information.      Learning About Stress  What is stress?     Stress is your body's response to a hard " situation. Your body can have a physical, emotional, or mental response. Stress is a fact of life for most people, and it affects everyone differently. What causes stress for you may not be stressful for someone else.  A lot of things can cause stress. You may feel stress when you go on a job interview, take a test, or run a race. This kind of short-term stress is normal and even useful. It can help you if you need to work hard or react quickly. For example, stress can help you finish an important job on time.  Long-term stress is caused by ongoing stressful situations or events. Examples of long-term stress include long-term health problems, ongoing problems at work, or conflicts in your family. Long-term stress can harm your health.  How does stress affect your health?  When you are stressed, your body responds as though you are in danger. It makes hormones that speed up your heart, make you breathe faster, and give you a burst of energy. This is called the fight-or-flight stress response. If the stress is over quickly, your body goes back to normal and no harm is done.  But if stress happens too often or lasts too long, it can have bad effects. Long-term stress can make you more likely to get sick, and it can make symptoms of some diseases worse. If you tense up when you are stressed, you may develop neck, shoulder, or low back pain. Stress is linked to high blood pressure and heart disease.  Stress also harms your emotional health. It can make you brewster, tense, or depressed. Your relationships may suffer, and you may not do well at work or school.  What can you do to manage stress?  You can try these things to help manage stress:   Do something active. Exercise or activity can help reduce stress. Walking is a great way to get started. Even everyday activities such as housecleaning or yard work can help.  Try yoga or brian chi. These techniques combine exercise and meditation. You may need some training at first to  learn them.  Do something you enjoy. For example, listen to music or go to a movie. Practice your hobby or do volunteer work.  Meditate. This can help you relax, because you are not worrying about what happened before or what may happen in the future.  Do guided imagery. Imagine yourself in any setting that helps you feel calm. You can use online videos, books, or a teacher to guide you.  Do breathing exercises. For example:  From a standing position, bend forward from the waist with your knees slightly bent. Let your arms dangle close to the floor.  Breathe in slowly and deeply as you return to a standing position. Roll up slowly and lift your head last.  Hold your breath for just a few seconds in the standing position.  Breathe out slowly and bend forward from the waist.  Let your feelings out. Talk, laugh, cry, and express anger when you need to. Talking with supportive friends or family, a counselor, or a caroline leader about your feelings is a healthy way to relieve stress. Avoid discussing your feelings with people who make you feel worse.  Write. It may help to write about things that are bothering you. This helps you find out how much stress you feel and what is causing it. When you know this, you can find better ways to cope.  What can you do to prevent stress?  You might try some of these things to help prevent stress:  Manage your time. This helps you find time to do the things you want and need to do.  Get enough sleep. Your body recovers from the stresses of the day while you are sleeping.  Get support. Your family, friends, and community can make a difference in how you experience stress.  Limit your news feed. Avoid or limit time on social media or news that may make you feel stressed.  Do something active. Exercise or activity can help reduce stress. Walking is a great way to get started.  Where can you learn more?  Go to https://www.healthwise.net/patiented  Enter N032 in the search box to learn more  "about \"Learning About Stress.\"  Current as of: October 24, 2023               Content Version: 14.0    1817-1770 [employer]   Care instructions adapted under license by your healthcare professional. If you have questions about a medical condition or this instruction, always ask your healthcare professional. [employer] disclaims any warranty or liability for your use of this information.      Bladder Training: Care Instructions  Your Care Instructions     Bladder training is used to treat urge incontinence and stress incontinence. Urge incontinence means that the need to urinate comes on so fast that you can't get to a toilet in time. Stress incontinence means that you leak urine because of pressure on your bladder. For example, it may happen when you laugh, cough, or lift something heavy.  Bladder training can increase how long you can wait before you have to urinate. It can also help your bladder hold more urine. And it can give you better control over the urge to urinate.  It is important to remember that bladder training takes a few weeks to a few months to make a difference. You may not see results right away, but don't give up.  Follow-up care is a key part of your treatment and safety. Be sure to make and go to all appointments, and call your doctor if you are having problems. It's also a good idea to know your test results and keep a list of the medicines you take.  How can you care for yourself at home?  Work with your doctor to come up with a bladder training program that is right for you. You may use one or more of the following methods.  Delayed urination  In the beginning, try to keep from urinating for 5 minutes after you first feel the need to go.  While you wait, take deep, slow breaths to relax. Kegel exercises can also help you delay the need to go to the bathroom.  After some practice, when you can easily wait 5 minutes to urinate, try to wait 10 minutes before you " "urinate.  Slowly increase the waiting period until you are able to control when you have to urinate.  Scheduled urination  Empty your bladder when you first wake up in the morning.  Schedule times throughout the day when you will urinate.  Start by going to the bathroom every hour, even if you don't need to go.  Slowly increase the time between trips to the bathroom.  When you have found a schedule that works well for you, keep doing it.  If you wake up during the night and have to urinate, do it. Apply your schedule to waking hours only.  Kegel exercises  These tighten and strengthen pelvic muscles, which can help you control the flow of urine. (If doing these exercises causes pain, stop doing them and talk with your doctor.) To do Kegel exercises:  Squeeze your muscles as if you were trying not to pass gas. Or squeeze your muscles as if you were stopping the flow of urine. Your belly, legs, and buttocks shouldn't move.  Hold the squeeze for 3 seconds, then relax for 5 to 10 seconds.  Start with 3 seconds, then add 1 second each week until you are able to squeeze for 10 seconds.  Repeat the exercise 10 times a session. Do 3 to 8 sessions a day.  When should you call for help?  Watch closely for changes in your health, and be sure to contact your doctor if:    Your incontinence is getting worse.     You do not get better as expected.   Where can you learn more?  Go to https://www.[employer].net/patiented  Enter V684 in the search box to learn more about \"Bladder Training: Care Instructions.\"  Current as of: November 15, 2023               Content Version: 14.0    0658-6364 [employer].   Care instructions adapted under license by your healthcare professional. If you have questions about a medical condition or this instruction, always ask your healthcare professional. [employer] disclaims any warranty or liability for your use of this information.      "

## 2024-04-17 NOTE — PROGRESS NOTES
Preventive Care Visit  [facility]  Yg Sharp MD, Family Medicine  Apr 17, 2024      Assessment & Plan     Screening for HIV (human immunodeficiency virus)  Discussed  - HIV Screening; Future    High blood cholesterol  Due  - Lipid panel reflex to direct LDL Non-fasting; Future  - Comprehensive metabolic panel (BMP + Alb, Alk Phos, ALT, AST, Total. Bili, TP); Future  - atorvastatin (LIPITOR) 40 MG tablet; Take 1 tablet (40 mg) by mouth daily    Benign essential hypertension  Conta as is  - CBC with platelets and differential; Future  - amLODIPine (NORVASC) 5 MG tablet; Take 1 tablet (5 mg) by mouth daily    Gastroesophageal reflux disease without esophagitis  Helps GERD, tolerated  - omeprazole (PRILOSEC) 40 MG DR capsule; Take 1 capsule (40 mg) by mouth daily    Major depressive disorder, recurrent episode, moderate (H)  Stable, tolerated  - sertraline (ZOLOFT) 100 MG tablet; Take 1 tablet (100 mg) by mouth daily    Routine general medical examination at a health care facility  Reviewed             BMI  Estimated body mass index is 32.2 kg/m  as calculated from the following:    Height as of this encounter: 1.524 m (5').    Weight as of this encounter: 74.8 kg (164 lb 14.4 oz).   Weight management plan: Discussed healthy diet and exercise guidelines    Counseling  Appropriate preventive services were discussed with this patient, including applicable screening as appropriate for fall prevention, nutrition, physical activity, Tobacco-use cessation, weight loss and cognition.  Checklist reviewing preventive services available has been given to the patient.  Reviewed patient's diet, addressing concerns and/or questions.   The patient was provided with written information regarding signs of hearing loss.   Information on urinary incontinence and treatment options given to patient.         Return in about 1 year (around 4/17/2025) for Annual Wellness  Visit.    Shai Kuhn is a 63 year old, presenting for the following:  Physical (Pt here for annual physical and to discuss medications)        2024     2:36 PM   Additional Questions   Roomed by Mony Miller   Accompanied by N/A        Via the Health Maintenance questionnaire, the patient has reported the following services have been completed -Mammogram, this information has been sent to the abstraction team.      HPI  Doing well  No c/o  Follows w/ sz cliniic, stable  Du for prev20/rsv, discussed, she knows can [facility]  Due for mammogram/dexa, [facility] will check w/ them, knows could do here  Labs due  Retires in a few mo  , will watch gkids  Past Medical History:   Diagnosis Date    Apnea, sleep 2007    reeval  Mild NOELLE    Depressive disorder     Essential hypertension 2013    Migraine headaches     Seizure disorder, complex partial (H)     Seizures (H)      Past Surgical History:   Procedure Laterality Date     SECTION      3x    COLONOSCOPY  2011    Procedure:COMBINED COLONOSCOPY, REMOVE TUMOR/POLYP/LESION BY SNARE; Surgeon:PAULA BEAN; Location: GI    COLONOSCOPY N/A 2022    Procedure: COLONOSCOPY;  Surgeon: Leventhal, Thomas Michael, MD;  Location:  GI    COLONOSCOPY      ENDOSCOPIC RELEASE CARPAL TUNNEL  2013    Procedure: ENDOSCOPIC RELEASE CARPAL TUNNEL;  Left Endoscopic Carpal Tunnel Release;  Surgeon: Kate Najera MD;  Location:  OR    ENDOSCOPIC RELEASE CARPAL TUNNEL  2013    Procedure: ENDOSCOPIC RELEASE CARPAL TUNNEL;  Right Endoscopic Carpal Tunnel Release  ;  Surgeon: Kate Najera MD;  Location: [facility]    GYN SURGERY      HYSTERECTOMY      Ovaries intact.  No cancer.      Current Outpatient Medications   Medication Sig Dispense Refill    amLODIPine (NORVASC) 5 MG tablet Take 1 tablet (5 mg) by mouth daily 90 tablet 3    atorvastatin (LIPITOR)  40 MG tablet Take 1 tablet (40 mg) by mouth daily 90 tablet 3    cetirizine (ZYRTEC) 10 MG tablet Take 10 mg by mouth every evening.      esomeprazole (NEXIUM) 40 MG DR capsule Take 40 mg by mouth      fexofenadine-pseudoePHEDrine (ALLEGRA-D)  MG 12 hr tablet Take 1 tablet by mouth every morning.      ibuprofen (ADVIL/MOTRIN) 800 MG tablet TAKE 1 TABLET BY MOUTH EVERY 8  HOURS AS NEEDED FOR MODERATE  PAIN 90 tablet 0    lamoTRIgine (LAMICTAL) 25 MG tablet TAKE 3 TABLETS BY MOUTH TWICE DAILY 180 tablet 11    magnesium oxide (MAG-OX) 400 MG tablet Take 1 tablet (400 mg) by mouth daily 90 tablet 1    mometasone (NASONEX) 50 MCG/ACT nasal spray Spray 2 sprays into both nostrils daily as needed       omeprazole (PRILOSEC) 40 MG DR capsule Take 1 capsule (40 mg) by mouth daily 90 capsule 3    sertraline (ZOLOFT) 100 MG tablet Take 1 tablet (100 mg) by mouth daily 90 tablet 3    levETIRAcetam (KEPPRA) 500 MG tablet TAKE 2 AND 1/2 TABLETS BY MOUTH TWICE DAILY (Patient not taking: Reported on 4/17/2024) 150 tablet 11    topiramate (TOPAMAX) 25 MG tablet Take 1 tablet (25 mg) by mouth 2 times daily (Patient not taking: Reported on 4/17/2024) 180 tablet 3     No current facility-administered medications for this visit.     Allergies   Allergen Reactions    Nkda [No Known Drug Allergy]     Seasonal Allergies      Family History   Problem Relation Age of Onset    Neurologic Disorder Mother         MS    Cardiovascular Father         Aneurysm    Glaucoma Brother     Breast Cancer Maternal Grandmother         Passed in 1941    Retinal detachment No family hx of     Macular Degeneration No family hx of      Social History     Socioeconomic History    Marital status:      Spouse name: Not on file    Number of children: Not on file    Years of education: Not on file    Highest education level: Not on file   Occupational History    Occupation:      Employer: [employer]   Tobacco Use    Smoking status: Never     Smokeless tobacco: Never   Substance and Sexual Activity    Alcohol use: Yes     Alcohol/week: 2.0 standard drinks of alcohol     Types: 2 Standard drinks or equivalent per week     Comment: Occasionally    Drug use: No    Sexual activity: Not Currently     Partners: Female, Male     Birth control/protection: Female Surgical   Other Topics Concern     Service Not Asked    Blood Transfusions Not Asked    Caffeine Concern Yes     Comment: Drinks several cups of coffee/day    Occupational Exposure Not Asked    Hobby Hazards Not Asked    Sleep Concern Yes     Comment: Reports history of untreated sleep apnea    Stress Concern Not Asked    Weight Concern Not Asked    Special Diet Not Asked    Back Care Not Asked    Exercise Not Asked    Bike Helmet Not Asked    Seat Belt Not Asked    Self-Exams Not Asked    Parent/sibling w/ CABG, MI or angioplasty before 65F 55M? No   Social History Narrative    Not on file     Social Determinants of Health     Financial Resource Strain: Low Risk  (4/10/2024)    Financial Resource Strain     Within the past 12 months, have you or your family members you live with been unable to get utilities (heat, electricity) when it was really needed?: No   Food Insecurity: Low Risk  (4/10/2024)    Food Insecurity     Within the past 12 months, did you worry that your food would run out before you got money to buy more?: No     Within the past 12 months, did the food you bought just not last and you didn t have money to get more?: No   Transportation Needs: Low Risk  (4/10/2024)    Transportation Needs     Within the past 12 months, has lack of transportation kept you from medical appointments, getting your medicines, non-medical meetings or appointments, work, or from getting things that you need?: No   Physical Activity: Insufficiently Active (4/10/2024)    Exercise Vital Sign     Days of Exercise per Week: 5 days     Minutes of Exercise per Session: 20 min   Stress: Stress Concern  Present (4/10/2024)    [employer] [place] [employer] - Occupational Stress Questionnaire     Feeling of Stress : To some extent   Social Connections: Unknown (4/10/2024)    Social Connection and Isolation Panel [NHANES]     Frequency of Communication with Friends and Family: Not on file     Frequency of Social Gatherings with Friends and Family: Once a week     Attends Congregation Services: Not on file     Active Member of Clubs or Organizations: Not on file     Attends Club or Organization Meetings: Not on file     Marital Status: Not on file   Interpersonal Safety: Low Risk  (4/17/2024)    Interpersonal Safety     Do you feel physically and emotionally safe where you currently live?: Yes     Within the past 12 months, have you been hit, slapped, kicked or otherwise physically hurt by someone?: No     Within the past 12 months, have you been humiliated or emotionally abused in other ways by your partner or ex-partner?: No   Housing Stability: Low Risk  (4/10/2024)    Housing Stability     Do you have housing? : Yes     Are you worried about losing your housing?: No         4/10/2024   General Health   How would you rate your overall physical health? Good   Feel stress (tense, anxious, or unable to sleep) To some extent   (!) STRESS CONCERN      4/10/2024   Nutrition   Diet: I don't know         4/10/2024   Exercise   Days per week of moderate/strenous exercise 5 days   Average minutes spent exercising at this level 20 min         4/10/2024   Social Factors   Frequency of gathering with friends or relatives Once a week   Worry food won't last until get money to buy more No   Food not last or not have enough money for food? No   Do you have housing?  Yes   Are you worried about losing your housing? No   Lack of transportation? No   Unable to get utilities (heat,electricity)? No         4/17/2024   Fall Risk   Gait Speed Test (Document in seconds) 3   Gait Speed Test Interpretation Less than or equal to 5.00  seconds - PASS          4/10/2024   Activities of Daily Living- Home Safety   Needs help with the following daily activites None of the above   Safety concerns in the home None of the above         4/10/2024   Dental   Dentist two times every year? Yes         4/10/2024   Hearing Screening   Hearing concerns? (!) I NEED TO ASK PEOPLE TO SPEAK UP OR REPEAT THEMSELVES.    (!) IT'S HARD TO FOLLOW A CONVERSATION IN A NOISY RESTAURANT OR CROWDED ROOM.    (!) TROUBLE UNDESTANDING A SPEAKER IN A PUBLIC MEETING OR [employer] SERVICE.    (!) TROUBLE FOLLOWING DIALOGUE IN THE THEATHER.    (!) TROUBLE UNDERSTANDING SOFT OR WHISPERED SPEECH.         4/10/2024   Driving Risk Screening   Patient/family members have concerns about driving (!) DECLINE         4/10/2024   General Alertness/Fatigue Screening   Have you been more tired than usual lately? No         4/10/2024   Urinary Incontinence Screening   Bothered by leaking urine in past 6 months Yes         4/10/2024   TB Screening   Were you born outside of the [place]? No       Today's PHQ-9 Score:       4/17/2024     2:35 PM   PHQ-9 SCORE   PHQ-9 Total Score MyChart 3 (Minimal depression)   PHQ-9 Total Score 3         4/10/2024   Substance Use   Alcohol more than 3/day or more than 7/wk No   Do you have a current opioid prescription? No   How severe/bad is pain from 1 to 10? 3/10   Do you use any other substances recreationally? No     Social History     Tobacco Use    Smoking status: Never    Smokeless tobacco: Never   Substance Use Topics    Alcohol use: Yes     Alcohol/week: 2.0 standard drinks of alcohol     Types: 2 Standard drinks or equivalent per week     Comment: Occasionally    Drug use: No             4/10/2024   Breast Cancer Screening   Family history of breast, colon, or ovarian cancer? Yes         4/10/2024   LAST FHS-7 RESULTS   1st degree relative breast or ovarian cancer Yes   Any relative bilateral breast cancer Unknown   Any male have breast cancer Unknown   Any  ONE woman have BOTH breast AND ovarian cancer Unknown   Any woman with breast cancer before 50yrs Yes   2 or more relatives with breast AND/OR ovarian cancer Unknown   2 or more relatives with breast AND/OR bowel cancer Unknown               4/10/2024   One time HIV Screening   Previous HIV test? No     History of abnormal Pap smear: Sees community [facility] routinely       ASCVD Risk   The 10-year ASCVD risk score (Madisyn POPE, et al., 2019) is: 8.8%    Values used to calculate the score:      Age: 63 years      Sex: Female      Is Non- : No      Diabetic: No      Tobacco smoker: No      Systolic Blood Pressure: 144 mmHg      Is BP treated: Yes      HDL Cholesterol: 70 mg/dL      Total Cholesterol: 214 mg/dL        Reviewed and updated as needed this visit by Provider                    Current providers sharing in care for this patient include:  Patient Care Team:  Yg Sharp MD as PCP - General (Family Practice)  Kristofer Tello MD as MD (Ophthalmology)  Cesar Freitas MD as MD (Neurology)  Fabiola Allen RN as Specialty Care Coordinator (Clinical Cardiac Electrophysiology)  Aure Mcmullen MD as MD (Clinical Cardiac Electrophysiology)  Yg Sharp MD as Assigned PCP  Nicola Ohara MD as Assigned Neuroscience Provider  Nicola Ohara MD as MD (Neurology)  Kristofer Tello MD as Assigned Surgical Provider    The following health maintenance items are reviewed in Epic and correct as of today:  Health Maintenance   Topic Date Due    ANNUAL REVIEW OF HM ORDERS  Never done    ADVANCE CARE PLANNING  Never done    DEPRESSION ACTION PLAN  Never done    MAMMO SCREENING  Never done    HIV SCREENING  Never done    RSV VACCINE (Pregnancy & 60+) (1 - 1-dose 60+ series) Never done    INFLUENZA VACCINE (1) 09/01/2023    COVID-19 Vaccine (5 - 2023-24 season) 09/01/2023    LIPID  04/14/2024    Pneumococcal Vaccine: 65+ Years (1 of 1 - PCV) 11/13/2023    PHQ-9  07/17/2024     MEDICARE ANNUAL WELLNESS VISIT  09/13/2024    FALL RISK ASSESSMENT  04/17/2025    GLUCOSE  04/14/2026    COLORECTAL CANCER SCREENING  04/25/2032    DTAP/TDAP/TD IMMUNIZATION (4 - Td or Tdap) 07/24/2033    DEXA  04/04/2037    HEPATITIS C SCREENING  Completed    ZOSTER IMMUNIZATION  Completed    IPV IMMUNIZATION  Aged Out    HPV IMMUNIZATION  Aged Out    MENINGITIS IMMUNIZATION  Aged Out    RSV MONOCLONAL ANTIBODY  Aged Out    PAP  Discontinued            Objective    Exam  BP (!) 144/66 (BP Location: Right arm, Patient Position: Sitting, Cuff Size: Adult Large)   Pulse 72   Ht 1.524 m (5')   Wt 74.8 kg (164 lb 14.4 oz)   SpO2 98%   BMI 32.20 kg/m     Estimated body mass index is 32.2 kg/m  as calculated from the following:    Height as of this encounter: 1.524 m (5').    Weight as of this encounter: 74.8 kg (164 lb 14.4 oz).    Physical Exam  GENERAL: alert and no distress  EYES: Eyes grossly normal to inspection, PERRL and conjunctivae and sclerae normal  HENT: ear canals and TM's normal, nose and mouth without ulcers or lesions  NECK: no adenopathy, no asymmetry, masses, or scars  RESP: lungs clear to auscultation - no rales, rhonchi or wheezes  CV: regular rate and rhythm, normal S1 S2, no S3 or S4, no murmur, click or rub, no peripheral edema  ABDOMEN: soft, nontender, no hepatosplenomegaly, no masses and bowel sounds normal  MS: no gross musculoskeletal defects noted, no edema  NEURO: Normal strength and tone, mentation intact and speech normal  PSYCH: mentation appears normal, affect normal/bright  LYMPH: no cervical, supraclavicular, axillary, or inguinal adenopathy        4/17/2024   Mini Cog   Clock Draw Score 2 Normal   3 Item Recall 3 objects recalled   Mini Cog Total Score 5         Signed Electronically by: Yg Sharp MD

## 2024-04-18 LAB — HIV 1+2 AB+HIV1 P24 AG SERPL QL IA: NONREACTIVE

## 2024-05-22 ENCOUNTER — OFFICE VISIT (OUTPATIENT)
Dept: NEUROLOGY | Facility: CLINIC | Age: 64
End: 2024-05-22
Payer: COMMERCIAL

## 2024-05-22 VITALS
OXYGEN SATURATION: 95 % | DIASTOLIC BLOOD PRESSURE: 84 MMHG | HEART RATE: 89 BPM | SYSTOLIC BLOOD PRESSURE: 137 MMHG | BODY MASS INDEX: 32.2 KG/M2 | RESPIRATION RATE: 16 BRPM | WEIGHT: 164 LBS | HEIGHT: 60 IN

## 2024-05-22 DIAGNOSIS — G40.219 PARTIAL SYMPTOMATIC EPILEPSY WITH COMPLEX PARTIAL SEIZURES, INTRACTABLE, WITHOUT STATUS EPILEPTICUS (H): ICD-10-CM

## 2024-05-22 DIAGNOSIS — G40.309 GENERALIZED CONVULSIVE EPILEPSY (H): ICD-10-CM

## 2024-05-22 PROCEDURE — G2211 COMPLEX E/M VISIT ADD ON: HCPCS | Performed by: PSYCHIATRY & NEUROLOGY

## 2024-05-22 PROCEDURE — 99214 OFFICE O/P EST MOD 30 MIN: CPT | Performed by: PSYCHIATRY & NEUROLOGY

## 2024-05-22 RX ORDER — LEVETIRACETAM 500 MG/1
1500 TABLET ORAL 2 TIMES DAILY
Qty: 180 TABLET | Refills: 11 | Status: SHIPPED | OUTPATIENT
Start: 2024-05-22

## 2024-05-22 RX ORDER — LAMOTRIGINE 100 MG/1
100 TABLET ORAL DAILY
Qty: 60 TABLET | Refills: 11 | Status: SHIPPED | OUTPATIENT
Start: 2024-05-22

## 2024-05-22 RX ORDER — NORTRIPTYLINE HCL 10 MG
10 CAPSULE ORAL AT BEDTIME
Qty: 30 CAPSULE | Refills: 11 | Status: SHIPPED | OUTPATIENT
Start: 2024-05-22

## 2024-05-22 ASSESSMENT — PAIN SCALES - GENERAL: PAINLEVEL: SEVERE PAIN (6)

## 2024-05-22 NOTE — LETTER
5/22/2024       RE: Hattie Mosher  [address] [facility] [id number]       Dear Colleague,    Thank you for referring your patient, Hattie Mosher, to the [facility] [place] [facility]. Please see a copy of my visit note below.    Chief Complaint:   Seizures     History of Present Illness:   In person follow up. 64 yo right handed woman with PMH significant for complex partial epilepsy, NOELLE, and migraine headache.  She was followed by Dr. Freitas for many years.  Since the last visit about 9 months ago, she reports that she is having seizures about 1-2 per month. She is currently on keppra 1250 mg BID, Lamictal 75 mg bid, no side effects were reported.  She started on Topamax 25 mg bid for 2 weeks in the past, she felt that it did not help and she did not feel good on it, and she stopped it.     She has been having headaches almost daily in the morning after she gets up. She is having migraines once a week, with photophobia and phonophobia, and nausea, intensity 7-8/10.  She felt that the headaches are caused by the weather and air pollution.  She is feeling better for the past few weeks.    She was admitted for vEEG monitoring for epilepsy surgery workup in 2011. The patient had a total of 29 electrographic seizures during her 7 days of monitoring, among which 8 events were definite electroclinical seizures that reportedly represented her habitual events, with the other 21 events not clearly associated with behavioral changes; many of the latter occurred when the patient was alone, without behavioral interaction during these events.     Recent EEG showed mildly abnormal EEG due to the presence of occasional left temporal theta slowing and rare left temporal poorly formed sharps.     Patient first started to have seizures when she was in her 30's.    She has 3 types of seizures. Type 1 is auras, which consisting of confusion,  "lightheadedness, occasional markie vu.  No strange smells or vision changes.  Now is almost once a day.   Type 2 is likely focal impaired seizures. \"Absence seizures\". They seizures consist of sudden loss of awareness without loss of consciousness.  She may get an aura consisting of confusion, lightheadedness, occasional markie vu before that.  The spell will last 1-3 minutes followed by confusion, lethargy, headache, followed by post-ictal fatigue. It may take her 1-2 days before she is back to her baseline.   She has no memory of the spells.  She has been told that she will appear confused, but will continue to talk, look around, and be able to move her arms/legs, etc as if she were completely aware.  No incontinence and oral trauma.  Once a week according to the daughter.  Type 3 is likely GTCs. She only had twice in her life.    Patient is having a lot of memory issues per daughter.    Triggers: Stess.    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  No head injury with LOC.  No other CNS infections.  No strokes.  No developmental delay.  Chronically sleep deprived from untreated NOELLE.  Multiple servings of caffeine per day.        Current Outpatient Medications   Medication Sig Dispense Refill    amLODIPine (NORVASC) 5 MG tablet Take 1 tablet (5 mg) by mouth daily 90 tablet 3    atorvastatin (LIPITOR) 40 MG tablet Take 1 tablet (40 mg) by mouth daily 90 tablet 3    cetirizine (ZYRTEC) 10 MG tablet Take 10 mg by mouth every evening.      esomeprazole (NEXIUM) 40 MG DR capsule Take 40 mg by mouth      fexofenadine-pseudoePHEDrine (ALLEGRA-D)  MG 12 hr tablet Take 1 tablet by mouth every morning.      ibuprofen (ADVIL/MOTRIN) 800 MG tablet TAKE 1 TABLET BY MOUTH EVERY 8  HOURS AS NEEDED FOR MODERATE  PAIN 90 tablet 0    lamoTRIgine (LAMICTAL) 25 MG tablet TAKE 3 TABLETS BY MOUTH TWICE DAILY 180 tablet 5    levETIRAcetam (KEPPRA) 500 MG tablet TAKE 2 AND 1/2 TABLETS BY MOUTH TWICE DAILY " 150 tablet 5    magnesium oxide (MAG-OX) 400 MG tablet Take 1 tablet (400 mg) by mouth daily 90 tablet 1    mometasone (NASONEX) 50 MCG/ACT nasal spray Spray 2 sprays into both nostrils daily as needed       omeprazole (PRILOSEC) 40 MG DR capsule Take 1 capsule (40 mg) by mouth daily 90 capsule 3    sertraline (ZOLOFT) 100 MG tablet Take 1 tablet (100 mg) by mouth daily 90 tablet 3     PAST AEDs:   Trileptal caused side effects.      Past Medical History:   Diagnosis Date    Apnea, sleep 2007    reeval  Mild NOELLE    Essential hypertension 2013    Migraine headaches     Seizure disorder, complex partial (H)     Seizures (H)        Past Surgical History:   Procedure Laterality Date     SECTION      3x    COLONOSCOPY  2011    Procedure:COMBINED COLONOSCOPY, REMOVE TUMOR/POLYP/LESION BY SNARE; Surgeon:PAULA BEAN; Location: GI    COLONOSCOPY N/A 2022    Procedure: COLONOSCOPY;  Surgeon: Leventhal, Thomas Michael, MD;  Location:  GI    ENDOSCOPIC RELEASE CARPAL TUNNEL  2013    Procedure: ENDOSCOPIC RELEASE CARPAL TUNNEL;  Left Endoscopic Carpal Tunnel Release;  Surgeon: Kate Najera MD;  [facility]    ENDOSCOPIC RELEASE CARPAL TUNNEL  2013    Procedure: ENDOSCOPIC RELEASE CARPAL TUNNEL;  Right Endoscopic Carpal Tunnel Release  ;  Surgeon: Kate Najera MD;  [facility]    HYSTERECTOMY      Ovaries intact.  No cancer.         Social History:         History   Substance Use Topics    Smoking status: Never Smoker     Smokeless tobacco: Not on file    Alcohol Use: 1.0 oz/week       2 drink(s) per week   --Works at [employer] for past 32 years.  , has 3 children.    --Drinks multiple servings of coffee per day.                Family History:    Family History            Family History   Problem Relation Age of Onset    Neurological Mother         MS     Cardiovascular Father         Aneurysm                 Allergies:    NKDA.                   Medications:      Prescriptions Prior to Admission             Prescriptions prior to admission   Medication Sig Dispense Refill    Spacer/Aero-Holding Chambers (AEROCHAMBER MV) MISC Use with albuterol inhaler        fexofenadine-pseudoephedrine (ALLEGRA-D 12 HOUR)  MG per tablet Take 1 tablet by mouth daily.        levetiracetam (KEPPRA) 500 MG tablet Take 500 mg by mouth 2 times daily.        atorvastatin (LIPITOR) 10 MG tablet Take 10 mg by mouth daily.        magnesium oxide (MAG-OXIDE) 400 MG tablet Take 1 tablet by mouth daily. Will need to see primary provider for further refills        Multiple Vitamin (MULTI-VITAMIN) per tablet Take 1 tablet by mouth daily.        mometasone (NASONEX) 50 MCG/ACT nasal spray 2 sprays by Both Nostrils route daily.        esomeprazole (NEXIUM) 40 MG capsule Take 40 mg by mouth daily. Patient needs to see primary care provider        oxcarbazepine (TRILEPTAL) 300 MG tablet Take 1 1/2 tablets (450mg) AM and 2 tablets (600mg) PM        sertraline (ZOLOFT) 100 MG tablet Take 100 mg by mouth daily.        cetirizine (ZYRTEC) 10 MG tablet Take 10 mg by mouth daily.                Review of Systems:     Negative.           Physical Exam:          AAX 3, speech fluent and appropriate, hearing normal. No facial weakness.      Investigations:   SUMMARY OF 8 DAYS OF VIDEO-EEG MONITORING (5/2008): The interictal recording was abnormal by virtue of generalized delta-theta slowing, often with bursts of high- amplitude delta slowing and drowsiness, with occasional left temporal polymorphic delta transients and left temporal sharp waves, and with rare right temporal polymorphic delta activities and right temporal sharp waves. No electrographic seizures were recorded during this monitoring procedure. No spells with confusion or other distinct behavioral alterations were recorded during this monitoring procedure. The bitemporal and generalized slowing indicates bitemporal and  generalized cerebral dysfunction, which is etiologically nonspecific. This video-EEG monitoring procedure is consistent with the interictal state of partial epilepsy, although no seizures were recorded. Clinical correlation is recommended.      SUMMARY OF 7 DAYS OF VIDEO-EEG MONITORING:  (4/2011)  The interictal EEG was abnormal due to focal independent and synchronous bitemporal delta slowing that often predominated on the left, to bursts of generalized delta-theta slowing, and to bitemporal independent spikes and sharp waves.  The interictal epileptiform abnormalities were much more frequent after the patient was tapered off antiepileptic medications, and the interictal epileptiform abnormalities declined in occurrence on re-introduction of anti-seizure medications.  The patient had a total of 29 electrographic seizures during her 7 days of monitoring, among which 8 events were definite electroclinical seizures that reportedly represented her habitual events, with the other 21 events not clearly associated with behavioral changes; many of the latter occurred when the patient was alone, without behavioral interaction during these events.  These 29 seizures occurred during Days 3, 4, and 5, when the patient was tapered off anti-seizure medications, and the frequent seizures stopped with re-introduction of anti-seizure medications.  The most common ictal discharge localization that occurred without significant artifactual obscuration was right temporal initial maximum followed by evolution to a sustained left temporal maximum of ictal discharges.  Other seizure patterns included left temporal initial maximum which was sustained, non-localizing ictal onsets with subsequent maxima on either or both sides, and obscured ictal onsets on either or both sides.  The patient also had subjective events, usually described as  hot flashes , none of which was associated with alteration of EEG activities from baseline.    The data  in this monitoring procedure are consistent with temporal lobe epilepsy.  Clinical correlation is recommended.    Brain MRI (11/06):  Impression: Very mild prominence of the right temporal horn.  Symmetrical volume and signal intensity of the hippocampi.       Neuropsych testing (3/2011):  There is no compelling evidence of intellectual decline from 7/08.  Auditory attention and word knowledge/expressive communicability are relative weaknesses, in the below average range. Graphomotor learning, verbal reasoning, and sequential analysis are low average.  Visuospatial processing/constructional abilities are a relative strength, in the above average to superior range.  Once again there is a pattern of poor recent (long-term) memory under verbal and nonverbal conditions.  On this occasion she actually had somewhat better (normal) long-term retention of a word list, but very poor long-term retention of lengthy story passages.  Her drawing of a complex figure is poorer on this occasion, in the impaired range, due to proportional and placement errors.  Long-term retention of simpler and more complex figural material is clearly in the impaired range on this occasion.  Recognition memory for figural material is inconsistent, ranging from mildly impaired to intact.  There has been some decline in nonverbal planning (maze solving), previously above average to superior, now low average.  Inductive reasoning on the other hand has improved, though she continues to show perseverative tendencies.  Set shifting, verbal associative fluency, and nonverbal associative fluency are essentially unchanged.  She continues to show poor speeded word retrieval.  Confrontation naming remains in the mildly impaired range, without much change from the last testing.  Finger tapping speeds and fine motor dexterity are slightly faster on this occasion, and now are in the normal range.       Taken as a whole, the findings do not provide clear,  consistent evidence of cognitive decline.  The findings continue to suggest multifocal cerebral dysfunction, with the dominant (presumably left) hemisphere more dysfunctional than the nondominant (right) hemisphere.  Mesial temporal brain regions bilaterally are dysfunctional.  That is consistent with a history of epilepsy, especially complex-partial epilepsy.                                                                                                             Assessment and Plan:     1. Focal epilepsy.  Since the last visit about 9 months ago, she reports that she is having seizures about 1-2 per month, all likely focal impaired seizures.  She is currently on keppra 1250 mg BID, Lamictal 75 mg bid, no side effects were reported.     2. Headaches.   She continues to have migraines 2-3 per months. She started on Topamax 25 mg bid for 2 weeks in the past, she felt that it did not help and she did not feel good on it, and she stopped it.     Plan:  1. Increase Keppra 1500 mg bid and Lamictal 100 mg bid.  2. Start Nortriptyline 10 mg at bedtime.  3. Labs: Keppra and Lamictal  4. RTC in 3 months.    The longitudinal plan of care for seizures was addressed during this visit. Due to the added complexity in care, I will continue to support this patient in the subsequent management of this condition and with the ongoing continuity of care of this condition.    30 min total time was spent on the day of this visit.      20 min was spent on face to face time  10 min was spent on preparation of visit to review charts and labs, ordering medications and tests, and documentation of clinical information      Again, thank you for allowing me to participate in the care of your patient.      Sincerely,    Nicola Ohara MD

## 2024-05-22 NOTE — PATIENT INSTRUCTIONS
Plan:  1. Increase Keppra 1500 mg bid and Lamictal 100 mg bid.  2. Start Nortriptyline 10 mg at bedtime.  3. Labs: Keppra and Lamictal  4. RTC in 3 months.

## 2024-05-22 NOTE — PROGRESS NOTES
"Chief Complaint:   Seizures     History of Present Illness:   In person follow up. 64 yo right handed woman with PMH significant for complex partial epilepsy, NOELLE, and migraine headache.  She was followed by Dr. Freitas for many years.  Since the last visit about 9 months ago, she reports that she is having seizures about 1-2 per month. She is currently on keppra 1250 mg BID, Lamictal 75 mg bid, no side effects were reported.  She started on Topamax 25 mg bid for 2 weeks in the past, she felt that it did not help and she did not feel good on it, and she stopped it.     She has been having headaches almost daily in the morning after she gets up. She is having migraines once a week, with photophobia and phonophobia, and nausea, intensity 7-8/10.  She felt that the headaches are caused by the weather and air pollution.  She is feeling better for the past few weeks.    She was admitted for vEEG monitoring for epilepsy surgery workup in 2011. The patient had a total of 29 electrographic seizures during her 7 days of monitoring, among which 8 events were definite electroclinical seizures that reportedly represented her habitual events, with the other 21 events not clearly associated with behavioral changes; many of the latter occurred when the patient was alone, without behavioral interaction during these events.     Recent EEG showed mildly abnormal EEG due to the presence of occasional left temporal theta slowing and rare left temporal poorly formed sharps.     Patient first started to have seizures when she was in her 30's.    She has 3 types of seizures. Type 1 is auras, which consisting of confusion, lightheadedness, occasional markie vu.  No strange smells or vision changes.  Now is almost once a day.   Type 2 is likely focal impaired seizures. \"Absence seizures\". They seizures consist of sudden loss of awareness without loss of consciousness.  She may get an aura consisting of confusion, lightheadedness, occasional markie " vu before that.  The spell will last 1-3 minutes followed by confusion, lethargy, headache, followed by post-ictal fatigue. It may take her 1-2 days before she is back to her baseline.   She has no memory of the spells.  She has been told that she will appear confused, but will continue to talk, look around, and be able to move her arms/legs, etc as if she were completely aware.  No incontinence and oral trauma.  Once a week according to the daughter.  Type 3 is likely GTCs. She only had twice in her life.    Patient is having a lot of memory issues per daughter.    Triggers: Stess.    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  No head injury with LOC.  No other CNS infections.  No strokes.  No developmental delay.  Chronically sleep deprived from untreated NOELLE.  Multiple servings of caffeine per day.        Current Outpatient Medications   Medication Sig Dispense Refill    amLODIPine (NORVASC) 5 MG tablet Take 1 tablet (5 mg) by mouth daily 90 tablet 3    atorvastatin (LIPITOR) 40 MG tablet Take 1 tablet (40 mg) by mouth daily 90 tablet 3    cetirizine (ZYRTEC) 10 MG tablet Take 10 mg by mouth every evening.      esomeprazole (NEXIUM) 40 MG DR capsule Take 40 mg by mouth      fexofenadine-pseudoePHEDrine (ALLEGRA-D)  MG 12 hr tablet Take 1 tablet by mouth every morning.      ibuprofen (ADVIL/MOTRIN) 800 MG tablet TAKE 1 TABLET BY MOUTH EVERY 8  HOURS AS NEEDED FOR MODERATE  PAIN 90 tablet 0    lamoTRIgine (LAMICTAL) 25 MG tablet TAKE 3 TABLETS BY MOUTH TWICE DAILY 180 tablet 5    levETIRAcetam (KEPPRA) 500 MG tablet TAKE 2 AND 1/2 TABLETS BY MOUTH TWICE DAILY 150 tablet 5    magnesium oxide (MAG-OX) 400 MG tablet Take 1 tablet (400 mg) by mouth daily 90 tablet 1    mometasone (NASONEX) 50 MCG/ACT nasal spray Spray 2 sprays into both nostrils daily as needed       omeprazole (PRILOSEC) 40 MG DR capsule Take 1 capsule (40 mg) by mouth daily 90 capsule 3    sertraline (ZOLOFT) 100 MG  tablet Take 1 tablet (100 mg) by mouth daily 90 tablet 3     PAST AEDs:   Trileptal caused side effects.      Past Medical History:   Diagnosis Date    Apnea, sleep 2007    reeval  Mild NOELLE    Essential hypertension 2013    Migraine headaches     Seizure disorder, complex partial (H)     Seizures (H)        Past Surgical History:   Procedure Laterality Date     SECTION      3x    COLONOSCOPY  2011    Procedure:COMBINED COLONOSCOPY, REMOVE TUMOR/POLYP/LESION BY SNARE; Surgeon:PAULA BEAN; Location: [facility]    COLONOSCOPY N/A 2022    Procedure: COLONOSCOPY;  Surgeon: Leventhal, Thomas Michael, MD;  Location:  GI    ENDOSCOPIC RELEASE CARPAL TUNNEL  2013    Procedure: ENDOSCOPIC RELEASE CARPAL TUNNEL;  Left Endoscopic Carpal Tunnel Release;  Surgeon: Kate Najera MD;  Location:  OR    ENDOSCOPIC RELEASE CARPAL TUNNEL  2013    Procedure: ENDOSCOPIC RELEASE CARPAL TUNNEL;  Right Endoscopic Carpal Tunnel Release  ;  Surgeon: Kate Najera MD;  Location:  OR    HYSTERECTOMY      Ovaries intact.  No cancer.         Social History:         History   Substance Use Topics    Smoking status: Never Smoker     Smokeless tobacco: Not on file    Alcohol Use: 1.0 oz/week       2 drink(s) per week   --Works at [employer] for past 32 years.  , has 3 children.    --Drinks multiple servings of coffee per day.                Family History:    Family History            Family History   Problem Relation Age of Onset    Neurological Mother         MS     Cardiovascular Father         Aneurysm                 Allergies:    NKDA.                  Medications:      Prescriptions Prior to Admission             Prescriptions prior to admission   Medication Sig Dispense Refill    Spacer/Aero-Holding Chambers (AEROCHAMBER MV) MISC Use with albuterol inhaler        fexofenadine-pseudoephedrine (ALLEGRA-D 12 HOUR)  MG per tablet Take 1 tablet by mouth  daily.        levetiracetam (KEPPRA) 500 MG tablet Take 500 mg by mouth 2 times daily.        atorvastatin (LIPITOR) 10 MG tablet Take 10 mg by mouth daily.        magnesium oxide (MAG-OXIDE) 400 MG tablet Take 1 tablet by mouth daily. Will need to see primary provider for further refills        Multiple Vitamin (MULTI-VITAMIN) per tablet Take 1 tablet by mouth daily.        mometasone (NASONEX) 50 MCG/ACT nasal spray 2 sprays by Both Nostrils route daily.        esomeprazole (NEXIUM) 40 MG capsule Take 40 mg by mouth daily. Patient needs to see primary care provider        oxcarbazepine (TRILEPTAL) 300 MG tablet Take 1 1/2 tablets (450mg) AM and 2 tablets (600mg) PM        sertraline (ZOLOFT) 100 MG tablet Take 100 mg by mouth daily.        cetirizine (ZYRTEC) 10 MG tablet Take 10 mg by mouth daily.                Review of Systems:     Negative.           Physical Exam:          AAX 3, speech fluent and appropriate, hearing normal. No facial weakness.      Investigations:   SUMMARY OF 8 DAYS OF VIDEO-EEG MONITORING (5/2008): The interictal recording was abnormal by virtue of generalized delta-theta slowing, often with bursts of high- amplitude delta slowing and drowsiness, with occasional left temporal polymorphic delta transients and left temporal sharp waves, and with rare right temporal polymorphic delta activities and right temporal sharp waves. No electrographic seizures were recorded during this monitoring procedure. No spells with confusion or other distinct behavioral alterations were recorded during this monitoring procedure. The bitemporal and generalized slowing indicates bitemporal and generalized cerebral dysfunction, which is etiologically nonspecific. This video-EEG monitoring procedure is consistent with the interictal state of partial epilepsy, although no seizures were recorded. Clinical correlation is recommended.      SUMMARY OF 7 DAYS OF VIDEO-EEG MONITORING:  (4/2011)  The interictal EEG was  abnormal due to focal independent and synchronous bitemporal delta slowing that often predominated on the left, to bursts of generalized delta-theta slowing, and to bitemporal independent spikes and sharp waves.  The interictal epileptiform abnormalities were much more frequent after the patient was tapered off antiepileptic medications, and the interictal epileptiform abnormalities declined in occurrence on re-introduction of anti-seizure medications.  The patient had a total of 29 electrographic seizures during her 7 days of monitoring, among which 8 events were definite electroclinical seizures that reportedly represented her habitual events, with the other 21 events not clearly associated with behavioral changes; many of the latter occurred when the patient was alone, without behavioral interaction during these events.  These 29 seizures occurred during Days 3, 4, and 5, when the patient was tapered off anti-seizure medications, and the frequent seizures stopped with re-introduction of anti-seizure medications.  The most common ictal discharge localization that occurred without significant artifactual obscuration was right temporal initial maximum followed by evolution to a sustained left temporal maximum of ictal discharges.  Other seizure patterns included left temporal initial maximum which was sustained, non-localizing ictal onsets with subsequent maxima on either or both sides, and obscured ictal onsets on either or both sides.  The patient also had subjective events, usually described as  hot flashes , none of which was associated with alteration of EEG activities from baseline.    The data in this monitoring procedure are consistent with temporal lobe epilepsy.  Clinical correlation is recommended.    Brain MRI (11/06):  Impression: Very mild prominence of the right temporal horn.  Symmetrical volume and signal intensity of the hippocampi.       Neuropsych testing (3/2011):  There is no compelling  evidence of intellectual decline from 7/08.  Auditory attention and word knowledge/expressive communicability are relative weaknesses, in the below average range. Graphomotor learning, verbal reasoning, and sequential analysis are low average.  Visuospatial processing/constructional abilities are a relative strength, in the above average to superior range.  Once again there is a pattern of poor recent (long-term) memory under verbal and nonverbal conditions.  On this occasion she actually had somewhat better (normal) long-term retention of a word list, but very poor long-term retention of lengthy story passages.  Her drawing of a complex figure is poorer on this occasion, in the impaired range, due to proportional and placement errors.  Long-term retention of simpler and more complex figural material is clearly in the impaired range on this occasion.  Recognition memory for figural material is inconsistent, ranging from mildly impaired to intact.  There has been some decline in nonverbal planning (maze solving), previously above average to superior, now low average.  Inductive reasoning on the other hand has improved, though she continues to show perseverative tendencies.  Set shifting, verbal associative fluency, and nonverbal associative fluency are essentially unchanged.  She continues to show poor speeded word retrieval.  Confrontation naming remains in the mildly impaired range, without much change from the last testing.  Finger tapping speeds and fine motor dexterity are slightly faster on this occasion, and now are in the normal range.       Taken as a whole, the findings do not provide clear, consistent evidence of cognitive decline.  The findings continue to suggest multifocal cerebral dysfunction, with the dominant (presumably left) hemisphere more dysfunctional than the nondominant (right) hemisphere.  Mesial temporal brain regions bilaterally are dysfunctional.  That is consistent with a history of  epilepsy, especially complex-partial epilepsy.                                                                                                             Assessment and Plan:     1. Focal epilepsy.  Since the last visit about 9 months ago, she reports that she is having seizures about 1-2 per month, all likely focal impaired seizures.  She is currently on keppra 1250 mg BID, Lamictal 75 mg bid, no side effects were reported.     2. Headaches.   She continues to have migraines 2-3 per months. She started on Topamax 25 mg bid for 2 weeks in the past, she felt that it did not help and she did not feel good on it, and she stopped it.     Plan:  1. Increase Keppra 1500 mg bid and Lamictal 100 mg bid.  2. Start Nortriptyline 10 mg at bedtime.  3. Labs: Keppra and Lamictal  4. RTC in 3 months.      The longitudinal plan of care for seizures was addressed during this visit. Due to the added complexity in care, I will continue to support this patient in the subsequent management of this condition and with the ongoing continuity of care of this condition.      30 min total time was spent on the day of this visit.      20 min was spent on face to face time  10 min was spent on preparation of visit to review charts and labs, ordering medications and tests, and documentation of clinical information

## 2024-08-11 ENCOUNTER — HEALTH MAINTENANCE LETTER (OUTPATIENT)
Age: 64
End: 2024-08-11

## 2024-10-01 ASSESSMENT — PATIENT HEALTH QUESTIONNAIRE - PHQ9
SUM OF ALL RESPONSES TO PHQ QUESTIONS 1-9: 4
SUM OF ALL RESPONSES TO PHQ QUESTIONS 1-9: 4
10. IF YOU CHECKED OFF ANY PROBLEMS, HOW DIFFICULT HAVE THESE PROBLEMS MADE IT FOR YOU TO DO YOUR WORK, TAKE CARE OF THINGS AT HOME, OR GET ALONG WITH OTHER PEOPLE: SOMEWHAT DIFFICULT

## 2024-10-02 ENCOUNTER — OFFICE VISIT (OUTPATIENT)
Dept: FAMILY MEDICINE | Facility: CLINIC | Age: 64
End: 2024-10-02
Payer: MEDICARE

## 2024-10-02 VITALS
DIASTOLIC BLOOD PRESSURE: 89 MMHG | HEIGHT: 60 IN | OXYGEN SATURATION: 98 % | SYSTOLIC BLOOD PRESSURE: 129 MMHG | HEART RATE: 88 BPM | BODY MASS INDEX: 32.03 KG/M2

## 2024-10-02 DIAGNOSIS — Z78.0 POSTMENOPAUSAL STATUS: Primary | ICD-10-CM

## 2024-10-02 DIAGNOSIS — I10 BENIGN ESSENTIAL HYPERTENSION: ICD-10-CM

## 2024-10-02 PROCEDURE — 90677 PCV20 VACCINE IM: CPT | Performed by: FAMILY MEDICINE

## 2024-10-02 PROCEDURE — 90480 ADMN SARSCOV2 VAC 1/ONLY CMP: CPT | Performed by: FAMILY MEDICINE

## 2024-10-02 PROCEDURE — 91320 SARSCV2 VAC 30MCG TRS-SUC IM: CPT | Performed by: FAMILY MEDICINE

## 2024-10-02 PROCEDURE — G0009 ADMIN PNEUMOCOCCAL VACCINE: HCPCS | Performed by: FAMILY MEDICINE

## 2024-10-02 PROCEDURE — 90662 IIV NO PRSV INCREASED AG IM: CPT | Performed by: FAMILY MEDICINE

## 2024-10-02 PROCEDURE — G0008 ADMIN INFLUENZA VIRUS VAC: HCPCS | Performed by: FAMILY MEDICINE

## 2024-10-02 PROCEDURE — 99213 OFFICE O/P EST LOW 20 MIN: CPT | Mod: 25 | Performed by: FAMILY MEDICINE

## 2024-10-02 NOTE — PROGRESS NOTES
Assessment & Plan     Postmenopausal status  Due, osteopenia  - DX Bone Density; Future    Benign essential hypertension  Cont as is    Keep planned Neuro seizure follow-up (no recent seizure)  See me in spring annuall, earlier prn    The longitudinal plan of care for the diagnosis(es)/condition(s) as documented were addressed during this visit. Due to the added complexity in care, I will continue to support Hattie in the subsequent management and with ongoing continuity of care.  23 minutes spent by me on the date of the encounter doing chart review, history and exam, documentation and further activities per the note    She plans RSV at [facility]            No follow-ups on file.    Subjective   Hattie is a 63 year old, presenting for the following health issues:  Follow Up      10/2/2024    12:24 PM   Additional Questions   Roomed by [facility]     History of Present Illness       Reason for visit:  Over all check up, prescription refill, flu shot. Shingles shot blood work.   She is taking medications regularly.   Here in follow-up  No acute or interval c/o  Shots uptdate except I suggest go to [facility] for RSV and explain it and write that down for her  Also due for routine dexa, rvwd what that is, ostepenia in past, she tries to get enough ca/D and walking she states  UTD on hcm measures o/w  BP at goal, meds rvwd and appropriate as is  Past Medical History:   Diagnosis Date    Apnea, sleep 2007    reeval  Mild NOELLE    Depressive disorder     Essential hypertension 2013    Migraine headaches     Seizure disorder, complex partial (H)     Seizures (H)      Past Surgical History:   Procedure Laterality Date     SECTION      3x    COLONOSCOPY  2011    Procedure:COMBINED COLONOSCOPY, REMOVE TUMOR/POLYP/LESION BY SNARE; Surgeon:PAULA BEAN; Location: [facility]    COLONOSCOPY N/A 2022    Procedure: COLONOSCOPY;  Surgeon: Leventhal, Thomas Michael, MD;  Location:  GI     COLONOSCOPY      ENDOSCOPIC RELEASE CARPAL TUNNEL  06/27/2013    Procedure: ENDOSCOPIC RELEASE CARPAL TUNNEL;  Left Endoscopic Carpal Tunnel Release;  Surgeon: Kate Najera MD;  [facility]    ENDOSCOPIC RELEASE CARPAL TUNNEL  08/22/2013    Procedure: ENDOSCOPIC RELEASE CARPAL TUNNEL;  Right Endoscopic Carpal Tunnel Release  ;  Surgeon: Kate Najera MD;  [facility]    GYN SURGERY      HYSTERECTOMY      Ovaries intact.  No cancer.      Current Outpatient Medications   Medication Sig Dispense Refill    amLODIPine (NORVASC) 5 MG tablet Take 1 tablet (5 mg) by mouth daily 90 tablet 3    atorvastatin (LIPITOR) 40 MG tablet Take 1 tablet (40 mg) by mouth daily 90 tablet 3    cetirizine (ZYRTEC) 10 MG tablet Take 10 mg by mouth every evening.      esomeprazole (NEXIUM) 40 MG DR capsule Take 40 mg by mouth      fexofenadine-pseudoePHEDrine (ALLEGRA-D)  MG 12 hr tablet Take 1 tablet by mouth every morning.      ibuprofen (ADVIL/MOTRIN) 800 MG tablet TAKE 1 TABLET BY MOUTH EVERY 8  HOURS AS NEEDED FOR MODERATE  PAIN 90 tablet 0    lamoTRIgine (LAMICTAL) 100 MG tablet Take 1 tablet (100 mg) by mouth daily 60 tablet 11    levETIRAcetam (KEPPRA) 500 MG tablet Take 3 tablets (1,500 mg) by mouth 2 times daily 180 tablet 11    magnesium oxide (MAG-OX) 400 MG tablet Take 1 tablet (400 mg) by mouth daily 90 tablet 1    mometasone (NASONEX) 50 MCG/ACT nasal spray Spray 2 sprays into both nostrils daily as needed       nortriptyline (PAMELOR) 10 MG capsule Take 1 capsule (10 mg) by mouth at bedtime 30 capsule 11    omeprazole (PRILOSEC) 40 MG DR capsule Take 1 capsule (40 mg) by mouth daily 90 capsule 3    sertraline (ZOLOFT) 100 MG tablet Take 1 tablet (100 mg) by mouth daily 90 tablet 3    topiramate (TOPAMAX) 25 MG tablet Take 1 tablet (25 mg) by mouth 2 times daily 180 tablet 3     No current facility-administered medications for this visit.     Allergies   Allergen Reactions    Nkda [No  Known Drug Allergy]     Seasonal Allergies      Family History   Problem Relation Age of Onset    Neurologic Disorder Mother         MS    Cardiovascular Father         Aneurysm    Glaucoma Brother     Breast Cancer Maternal Grandmother         Passed in 1941    Retinal detachment No family hx of     Macular Degeneration No family hx of      Social History     Socioeconomic History    Marital status:      Spouse name: Not on file    Number of children: Not on file    Years of education: Not on file    Highest education level: Not on file   Occupational History    Occupation:      Employer: [employer]   Tobacco Use    Smoking status: Never    Smokeless tobacco: Never   Substance and Sexual Activity    Alcohol use: Yes     Alcohol/week: 2.0 standard drinks of alcohol     Types: 2 Standard drinks or equivalent per week     Comment: Occasionally    Drug use: No    Sexual activity: Not Currently     Partners: Female, Male     Birth control/protection: Female Surgical   Other Topics Concern     Service Not Asked    Blood Transfusions Not Asked    Caffeine Concern Yes     Comment: Drinks several cups of coffee/day    Occupational Exposure Not Asked    Hobby Hazards Not Asked    Sleep Concern Yes     Comment: Reports history of untreated sleep apnea    Stress Concern Not Asked    Weight Concern Not Asked    Special Diet Not Asked    Back Care Not Asked    Exercise Not Asked    Bike Helmet Not Asked    Seat Belt Not Asked    Self-Exams Not Asked    Parent/sibling w/ CABG, MI or angioplasty before 65F 55M? No   Social History Narrative    Not on file     Social Determinants of Health     Financial Resource Strain: Low Risk  (4/10/2024)    Financial Resource Strain     Within the past 12 months, have you or your family members you live with been unable to get utilities (heat, electricity) when it was really needed?: No   Food Insecurity: Low Risk  (4/10/2024)    Food Insecurity     Within the past  12 months, did you worry that your food would run out before you got money to buy more?: No     Within the past 12 months, did the food you bought just not last and you didn t have money to get more?: No   Transportation Needs: Low Risk  (4/10/2024)    Transportation Needs     Within the past 12 months, has lack of transportation kept you from medical appointments, getting your medicines, non-medical meetings or appointments, work, or from getting things that you need?: No   Physical Activity: Insufficiently Active (4/10/2024)    Exercise Vital Sign     Days of Exercise per Week: 5 days     Minutes of Exercise per Session: 20 min   Stress: Stress Concern Present (4/10/2024)    [employer] - Occupational Stress Questionnaire     Feeling of Stress : To some extent   Social Connections: Unknown (4/10/2024)    Social Connection and Isolation Panel [NHANES]     Frequency of Communication with Friends and Family: Not on file     Frequency of Social Gatherings with Friends and Family: Once a week     Attends Cheondoism Services: Not on file     Active Member of Clubs or Organizations: Not on file     Attends Club or Organization Meetings: Not on file     Marital Status: Not on file   Interpersonal Safety: Low Risk  (4/17/2024)    Interpersonal Safety     Do you feel physically and emotionally safe where you currently live?: Yes     Within the past 12 months, have you been hit, slapped, kicked or otherwise physically hurt by someone?: No     Within the past 12 months, have you been humiliated or emotionally abused in other ways by your partner or ex-partner?: No   Housing Stability: Low Risk  (4/10/2024)    Housing Stability     Do you have housing? : Yes     Are you worried about losing your housing?: No         Objective    /89 (BP Location: Right arm, Patient Position: Sitting, Cuff Size: Adult Large)   Pulse 88   Ht 1.524 m (5')   SpO2 98%   BMI 32.03 kg/m    Body mass index is  32.03 kg/m .  Physical Exam   GENERAL: alert and no distress  NECK: no adenopathy, no asymmetry, masses, or scars  RESP: lungs clear to auscultation - no rales, rhonchi or wheezes  CV: regular rate and rhythm, normal S1 S2, no S3 or S4, no murmur, click or rub, no peripheral edema  ABDOMEN: soft, nontender, no hepatosplenomegaly, no masses and bowel sounds normal  MS: no gross musculoskeletal defects noted, no edema            Signed Electronically by: Yg Sharp MD

## 2024-10-18 ENCOUNTER — TELEPHONE (OUTPATIENT)
Dept: FAMILY MEDICINE | Facility: CLINIC | Age: 64
End: 2024-10-18
Payer: MEDICARE

## 2024-10-18 NOTE — TELEPHONE ENCOUNTER
Left Voicemail (1st Attempt) and Sent Mychart (1st Attempt) for the patient to call back and schedule the following:    Appointment type: DX Bone Density Scan (Imaging)  Provider: per PCP  Return date: prior to 4/22/2025 visit with PCP  Specialty phone number: [phone]  Additional appointment(s) needed: -  Additonal Notes: -

## 2024-11-05 ENCOUNTER — TELEPHONE (OUTPATIENT)
Dept: NEUROLOGY | Facility: CLINIC | Age: 64
End: 2024-11-05

## 2024-11-05 NOTE — TELEPHONE ENCOUNTER
Received DMV(LOC) Form to be completed. Form saved to [facility] [employer], encounter routed.  Serafin Kohli LPN

## 2024-11-21 ENCOUNTER — ANCILLARY PROCEDURE (OUTPATIENT)
Dept: BONE DENSITY | Facility: CLINIC | Age: 64
End: 2024-11-21
Attending: FAMILY MEDICINE
Payer: MEDICARE

## 2024-11-21 DIAGNOSIS — Z78.0 POSTMENOPAUSAL STATUS: ICD-10-CM

## 2024-11-21 PROCEDURE — 77080 DXA BONE DENSITY AXIAL: CPT | Performed by: INTERNAL MEDICINE

## 2024-11-27 NOTE — TELEPHONE ENCOUNTER
DMV form signed , faxed and mailed to [employer] on 11/27/2024, sent to scanning, and copy mailed to patient.

## 2025-01-31 NOTE — TELEPHONE ENCOUNTER
4/14/2023  [facility] [place]     Yg Sharp MD  Family Medicine     
Yes

## 2025-02-27 DIAGNOSIS — I10 BENIGN ESSENTIAL HYPERTENSION: ICD-10-CM

## 2025-02-27 DIAGNOSIS — E78.00 HIGH BLOOD CHOLESTEROL: ICD-10-CM

## 2025-02-27 DIAGNOSIS — F33.1 MAJOR DEPRESSIVE DISORDER, RECURRENT EPISODE, MODERATE (H): ICD-10-CM

## 2025-03-04 RX ORDER — SERTRALINE HYDROCHLORIDE 100 MG/1
100 TABLET, FILM COATED ORAL DAILY
Qty: 90 TABLET | Refills: 3 | Status: SHIPPED | OUTPATIENT
Start: 2025-03-04

## 2025-03-04 RX ORDER — ATORVASTATIN CALCIUM 40 MG/1
40 TABLET, FILM COATED ORAL DAILY
Qty: 90 TABLET | Refills: 3 | Status: SHIPPED | OUTPATIENT
Start: 2025-03-04

## 2025-03-04 RX ORDER — AMLODIPINE BESYLATE 5 MG/1
5 TABLET ORAL DAILY
Qty: 90 TABLET | Refills: 3 | Status: SHIPPED | OUTPATIENT
Start: 2025-03-04

## 2025-03-04 NOTE — TELEPHONE ENCOUNTER
Sertraline 100 mg tablet - 1 tablet PO daily    Amlodipine 5 mg tablet - 1 tablet PO daily    Atorvastatin 40 mg tablet - 1 tablet PO daily    Last Clinic Visit: 10/02/2024

## 2025-04-08 DIAGNOSIS — K21.9 GASTROESOPHAGEAL REFLUX DISEASE WITHOUT ESOPHAGITIS: ICD-10-CM

## 2025-04-09 RX ORDER — OMEPRAZOLE 40 MG/1
40 CAPSULE, DELAYED RELEASE ORAL DAILY
Qty: 90 CAPSULE | Refills: 3 | Status: SHIPPED | OUTPATIENT
Start: 2025-04-09

## 2025-04-19 SDOH — HEALTH STABILITY: PHYSICAL HEALTH: ON AVERAGE, HOW MANY MINUTES DO YOU ENGAGE IN EXERCISE AT THIS LEVEL?: 20 MIN

## 2025-04-19 SDOH — HEALTH STABILITY: PHYSICAL HEALTH: ON AVERAGE, HOW MANY DAYS PER WEEK DO YOU ENGAGE IN MODERATE TO STRENUOUS EXERCISE (LIKE A BRISK WALK)?: 2 DAYS

## 2025-04-19 ASSESSMENT — SOCIAL DETERMINANTS OF HEALTH (SDOH): HOW OFTEN DO YOU GET TOGETHER WITH FRIENDS OR RELATIVES?: ONCE A WEEK

## 2025-04-22 ENCOUNTER — LAB (OUTPATIENT)
Dept: LAB | Facility: CLINIC | Age: 65
End: 2025-04-22
Payer: MEDICARE

## 2025-04-22 ENCOUNTER — OFFICE VISIT (OUTPATIENT)
Dept: FAMILY MEDICINE | Facility: CLINIC | Age: 65
End: 2025-04-22
Payer: MEDICARE

## 2025-04-22 VITALS
RESPIRATION RATE: 16 BRPM | HEART RATE: 87 BPM | HEIGHT: 60 IN | TEMPERATURE: 98 F | BODY MASS INDEX: 33.12 KG/M2 | SYSTOLIC BLOOD PRESSURE: 130 MMHG | WEIGHT: 168.7 LBS | DIASTOLIC BLOOD PRESSURE: 85 MMHG | OXYGEN SATURATION: 96 %

## 2025-04-22 DIAGNOSIS — I10 BENIGN ESSENTIAL HYPERTENSION: ICD-10-CM

## 2025-04-22 DIAGNOSIS — I10 ESSENTIAL HYPERTENSION: ICD-10-CM

## 2025-04-22 DIAGNOSIS — I10 ESSENTIAL HYPERTENSION: Primary | ICD-10-CM

## 2025-04-22 DIAGNOSIS — Z00.00 ENCOUNTER FOR MEDICARE ANNUAL WELLNESS EXAM: ICD-10-CM

## 2025-04-22 DIAGNOSIS — G40.209 PARTIAL SYMPTOMATIC EPILEPSY WITH COMPLEX PARTIAL SEIZURES, NOT INTRACTABLE, WITHOUT STATUS EPILEPTICUS (H): ICD-10-CM

## 2025-04-22 DIAGNOSIS — E78.00 HIGH BLOOD CHOLESTEROL: ICD-10-CM

## 2025-04-22 DIAGNOSIS — Z00.00 HEALTH CARE MAINTENANCE: ICD-10-CM

## 2025-04-22 DIAGNOSIS — F33.1 MAJOR DEPRESSIVE DISORDER, RECURRENT EPISODE, MODERATE (H): ICD-10-CM

## 2025-04-22 LAB
ALBUMIN SERPL BCG-MCNC: 4.3 G/DL (ref 3.5–5.2)
ALP SERPL-CCNC: 89 U/L (ref 40–150)
ALT SERPL W P-5'-P-CCNC: 33 U/L (ref 0–50)
ANION GAP SERPL CALCULATED.3IONS-SCNC: 8 MMOL/L (ref 7–15)
AST SERPL W P-5'-P-CCNC: 29 U/L (ref 0–45)
BASOPHILS # BLD AUTO: 0.1 10E3/UL (ref 0–0.2)
BASOPHILS NFR BLD AUTO: 1 %
BILIRUB SERPL-MCNC: 0.4 MG/DL
BUN SERPL-MCNC: 10.1 MG/DL (ref 8–23)
CALCIUM SERPL-MCNC: 9.8 MG/DL (ref 8.8–10.4)
CHLORIDE SERPL-SCNC: 103 MMOL/L (ref 98–107)
CHOLEST SERPL-MCNC: 199 MG/DL
CREAT SERPL-MCNC: 0.67 MG/DL (ref 0.51–0.95)
EGFRCR SERPLBLD CKD-EPI 2021: >90 ML/MIN/1.73M2
EOSINOPHIL # BLD AUTO: 0.2 10E3/UL (ref 0–0.7)
EOSINOPHIL NFR BLD AUTO: 2 %
ERYTHROCYTE [DISTWIDTH] IN BLOOD BY AUTOMATED COUNT: 12.9 % (ref 10–15)
FASTING STATUS PATIENT QL REPORTED: NO
FASTING STATUS PATIENT QL REPORTED: NO
GLUCOSE SERPL-MCNC: 89 MG/DL (ref 70–99)
HCO3 SERPL-SCNC: 28 MMOL/L (ref 22–29)
HCT VFR BLD AUTO: 41.3 % (ref 35–47)
HDLC SERPL-MCNC: 68 MG/DL
HGB BLD-MCNC: 13.6 G/DL (ref 11.7–15.7)
IMM GRANULOCYTES # BLD: 0 10E3/UL
IMM GRANULOCYTES NFR BLD: 0 %
LDLC SERPL CALC-MCNC: 92 MG/DL
LYMPHOCYTES # BLD AUTO: 4.6 10E3/UL (ref 0.8–5.3)
LYMPHOCYTES NFR BLD AUTO: 43 %
MCH RBC QN AUTO: 30.5 PG (ref 26.5–33)
MCHC RBC AUTO-ENTMCNC: 32.9 G/DL (ref 31.5–36.5)
MCV RBC AUTO: 93 FL (ref 78–100)
MONOCYTES # BLD AUTO: 0.9 10E3/UL (ref 0–1.3)
MONOCYTES NFR BLD AUTO: 9 %
NEUTROPHILS # BLD AUTO: 4.8 10E3/UL (ref 1.6–8.3)
NEUTROPHILS NFR BLD AUTO: 45 %
NONHDLC SERPL-MCNC: 131 MG/DL
NRBC # BLD AUTO: 0 10E3/UL
NRBC BLD AUTO-RTO: 0 /100
PLATELET # BLD AUTO: 369 10E3/UL (ref 150–450)
POTASSIUM SERPL-SCNC: 4.1 MMOL/L (ref 3.4–5.3)
PROT SERPL-MCNC: 8 G/DL (ref 6.4–8.3)
RBC # BLD AUTO: 4.46 10E6/UL (ref 3.8–5.2)
SODIUM SERPL-SCNC: 139 MMOL/L (ref 135–145)
TRIGL SERPL-MCNC: 197 MG/DL
WBC # BLD AUTO: 10.6 10E3/UL (ref 4–11)

## 2025-04-22 PROCEDURE — 36415 COLL VENOUS BLD VENIPUNCTURE: CPT | Performed by: PATHOLOGY

## 2025-04-22 PROCEDURE — 3079F DIAST BP 80-89 MM HG: CPT | Performed by: FAMILY MEDICINE

## 2025-04-22 PROCEDURE — 80053 COMPREHEN METABOLIC PANEL: CPT | Performed by: PATHOLOGY

## 2025-04-22 PROCEDURE — G0439 PPPS, SUBSEQ VISIT: HCPCS | Performed by: FAMILY MEDICINE

## 2025-04-22 PROCEDURE — 90480 ADMN SARSCOV2 VAC 1/ONLY CMP: CPT | Performed by: FAMILY MEDICINE

## 2025-04-22 PROCEDURE — 91320 SARSCV2 VAC 30MCG TRS-SUC IM: CPT | Performed by: FAMILY MEDICINE

## 2025-04-22 PROCEDURE — 3075F SYST BP GE 130 - 139MM HG: CPT | Performed by: FAMILY MEDICINE

## 2025-04-22 PROCEDURE — 80061 LIPID PANEL: CPT | Performed by: PATHOLOGY

## 2025-04-22 PROCEDURE — 85025 COMPLETE CBC W/AUTO DIFF WBC: CPT | Performed by: PATHOLOGY

## 2025-04-22 RX ORDER — AMLODIPINE BESYLATE 5 MG/1
5 TABLET ORAL DAILY
Qty: 90 TABLET | Refills: 3 | Status: SHIPPED | OUTPATIENT
Start: 2025-04-22

## 2025-04-22 RX ORDER — SERTRALINE HYDROCHLORIDE 100 MG/1
100 TABLET, FILM COATED ORAL DAILY
Qty: 90 TABLET | Refills: 3 | Status: SHIPPED | OUTPATIENT
Start: 2025-04-22

## 2025-04-22 RX ORDER — ATORVASTATIN CALCIUM 40 MG/1
40 TABLET, FILM COATED ORAL DAILY
Qty: 90 TABLET | Refills: 3 | Status: SHIPPED | OUTPATIENT
Start: 2025-04-22

## 2025-04-22 ASSESSMENT — PATIENT HEALTH QUESTIONNAIRE - PHQ9
SUM OF ALL RESPONSES TO PHQ QUESTIONS 1-9: 4
10. IF YOU CHECKED OFF ANY PROBLEMS, HOW DIFFICULT HAVE THESE PROBLEMS MADE IT FOR YOU TO DO YOUR WORK, TAKE CARE OF THINGS AT HOME, OR GET ALONG WITH OTHER PEOPLE: NOT DIFFICULT AT ALL
SUM OF ALL RESPONSES TO PHQ QUESTIONS 1-9: 4

## 2025-04-22 NOTE — PROGRESS NOTES
Preventive Care Visit  [facility]  Yg Sharp MD, Family Medicine  Apr 22, 2025      Assessment & Plan     Essential hypertension    - Lipid panel reflex to direct LDL Fasting; Future  - Comprehensive metabolic panel (BMP + Alb, Alk Phos, ALT, AST, Total. Bili, TP); Future  - CBC with platelets and differential; Future    High blood cholesterol    - atorvastatin (LIPITOR) 40 MG tablet; Take 1 tablet (40 mg) by mouth daily.    Major depressive disorder, recurrent episode, moderate (H)    - sertraline (ZOLOFT) 100 MG tablet; Take 1 tablet (100 mg) by mouth daily.    Benign essential hypertension    - amLODIPine (NORVASC) 5 MG tablet; Take 1 tablet (5 mg) by mouth daily.    Health care maintenance    - COVID-19 12+ (PFIZER)  - Adult Eye  Referral; Future    Partial symptomatic epilepsy with complex partial seizures, not intractable, without status epilepticus (H)    - Adult Neurology  Referral; Future    Encounter for Medicare annual wellness exam  Done            BMI  Estimated body mass index is 32.95 kg/m  as calculated from the following:    Height as of this encounter: 1.524 m (5').    Weight as of this encounter: 76.5 kg (168 lb 11.2 oz).   Weight management plan: Discussed healthy diet and exercise guidelines    Counseling  Appropriate preventive services were addressed with this patient via screening, questionnaire, or discussion as appropriate for fall prevention, nutrition, physical activity, Tobacco-use cessation, social engagement, weight loss and cognition.  Checklist reviewing preventive services available has been given to the patient.  Reviewed patient's diet, addressing concerns and/or questions.   She is at risk for lack of exercise and has been provided with information to increase physical activity for the benefit of her well-being.   The patient was provided with written information regarding signs of hearing loss.    Information on urinary incontinence and treatment options given to patient.           Subjective   Hattie is a 64 year old, presenting for the following:  Physical (Pt here for physical)        2025     9:31 AM   Additional Questions   Roomed by Nisreen ELMORE     [facility]  Here for annual  No c/o  Doing well  Retired, cares for grandkids some  Rvwd can do covid shot now  Sees community [facility] annually does mammogram there per pt  UTD colon  Rvwd meds; she is staable bp, lipids, mood, tolerates  Rvwd seizures; none lately, she asks can we find her seizure MD closer to home, will try see referral  Past Medical History:   Diagnosis Date    Apnea, sleep 2007    reeval  Mild NOELLE    Depressive disorder     Essential hypertension 2013    Migraine headaches     Seizure disorder, complex partial (H)     Seizures (H)      Past Surgical History:   Procedure Laterality Date     SECTION      3x    COLONOSCOPY  2011    Procedure:COMBINED COLONOSCOPY, REMOVE TUMOR/POLYP/LESION BY SNARE; Surgeon:PAULA BEAN; Location:[facility]    COLONOSCOPY N/A 2022    Procedure: COLONOSCOPY;  Surgeon: Leventhal, Thomas Michael, MD;  Location: [facility]    COLONOSCOPY      ENDOSCOPIC RELEASE CARPAL TUNNEL  2013    Procedure: ENDOSCOPIC RELEASE CARPAL TUNNEL;  Left Endoscopic Carpal Tunnel Release;  Surgeon: Kate Najera MD;  [facility]    ENDOSCOPIC RELEASE CARPAL TUNNEL  2013    Procedure: ENDOSCOPIC RELEASE CARPAL TUNNEL;  Right Endoscopic Carpal Tunnel Release  ;  Surgeon: Kate Najera MD;  Location: [facility]    GYN SURGERY      HYSTERECTOMY      Ovaries intact.  No cancer.      Current Outpatient Medications   Medication Sig Dispense Refill    amLODIPine (NORVASC) 5 MG tablet Take 1 tablet (5 mg) by mouth daily. 90 tablet 3    atorvastatin (LIPITOR) 40 MG tablet Take 1 tablet (40 mg) by mouth daily. 90 tablet 3    cetirizine (ZYRTEC) 10 MG tablet Take 10 mg by  mouth every evening.      esomeprazole (NEXIUM) 40 MG DR capsule Take 40 mg by mouth      fexofenadine-pseudoePHEDrine (ALLEGRA-D)  MG 12 hr tablet Take 1 tablet by mouth every morning.      ibuprofen (ADVIL/MOTRIN) 800 MG tablet TAKE 1 TABLET BY MOUTH EVERY 8  HOURS AS NEEDED FOR MODERATE  PAIN 90 tablet 0    lamoTRIgine (LAMICTAL) 100 MG tablet Take 1 tablet (100 mg) by mouth daily 60 tablet 11    levETIRAcetam (KEPPRA) 500 MG tablet Take 3 tablets (1,500 mg) by mouth 2 times daily 180 tablet 11    magnesium oxide (MAG-OX) 400 MG tablet Take 1 tablet (400 mg) by mouth daily 90 tablet 1    mometasone (NASONEX) 50 MCG/ACT nasal spray Spray 2 sprays into both nostrils daily as needed       nortriptyline (PAMELOR) 10 MG capsule Take 1 capsule (10 mg) by mouth at bedtime 30 capsule 11    omeprazole (PRILOSEC) 40 MG DR capsule TAKE 1 CAPSULE BY MOUTH DAILY 90 capsule 3    sertraline (ZOLOFT) 100 MG tablet Take 1 tablet (100 mg) by mouth daily. 90 tablet 3    topiramate (TOPAMAX) 25 MG tablet Take 1 tablet (25 mg) by mouth 2 times daily 180 tablet 3     No current facility-administered medications for this visit.     Allergies   Allergen Reactions    Nkda [No Known Drug Allergy]     Seasonal Allergies      Family History   Problem Relation Age of Onset    Neurologic Disorder Mother         MS    Cardiovascular Father         Aneurysm    Glaucoma Brother     Breast Cancer Maternal Grandmother         Passed in 1941    Retinal detachment No family hx of     Macular Degeneration No family hx of      Social History     Socioeconomic History    Marital status:      Spouse name: Not on file    Number of children: Not on file    Years of education: Not on file    Highest education level: Not on file   Occupational History    Occupation:      Employer: [id number]   Tobacco Use    Smoking status: Never    Smokeless tobacco: Never   Substance and Sexual Activity    Alcohol use: Yes     Alcohol/week: 2.0  standard drinks of alcohol     Types: 2 Standard drinks or equivalent per week     Comment: Occasionally    Drug use: No    Sexual activity: Not Currently     Partners: Female, Male     Birth control/protection: Female Surgical   Other Topics Concern     Service Not Asked    Blood Transfusions Not Asked    Caffeine Concern Yes     Comment: Drinks several cups of coffee/day    Occupational Exposure Not Asked    Hobby Hazards Not Asked    Sleep Concern Yes     Comment: Reports history of untreated sleep apnea    Stress Concern Not Asked    Weight Concern Not Asked    Special Diet Not Asked    Back Care Not Asked    Exercise Not Asked    Bike Helmet Not Asked    Seat Belt Not Asked    Self-Exams Not Asked    Parent/sibling w/ CABG, MI or angioplasty before 65F 55M? No   Social History Narrative    Not on file     Social Drivers of Health     Financial Resource Strain: Low Risk  (4/19/2025)    Financial Resource Strain     Within the past 12 months, have you or your family members you live with been unable to get utilities (heat, electricity) when it was really needed?: No   Food Insecurity: Low Risk  (4/19/2025)    Food Insecurity     Within the past 12 months, did you worry that your food would run out before you got money to buy more?: No     Within the past 12 months, did the food you bought just not last and you didn t have money to get more?: No   Transportation Needs: Low Risk  (4/19/2025)    Transportation Needs     Within the past 12 months, has lack of transportation kept you from medical appointments, getting your medicines, non-medical meetings or appointments, work, or from getting things that you need?: No   Physical Activity: Insufficiently Active (4/19/2025)    Exercise Vital Sign     Days of Exercise per Week: 2 days     Minutes of Exercise per Session: 20 min   Stress: No Stress Concern Present (4/19/2025)    [employer] - Occupational Stress Questionnaire      Feeling of Stress : Only a little   Social Connections: Unknown (4/19/2025)    Social Connection and Isolation Panel [NHANES]     Frequency of Communication with Friends and Family: Not on file     Frequency of Social Gatherings with Friends and Family: Once a week     Attends Confucianism Services: Not on file     Active Member of Clubs or Organizations: Not on file     Attends Club or Organization Meetings: Not on file     Marital Status: Not on file   Interpersonal Safety: Low Risk  (4/17/2024)    Interpersonal Safety     Do you feel physically and emotionally safe where you currently live?: Yes     Within the past 12 months, have you been hit, slapped, kicked or otherwise physically hurt by someone?: No     Within the past 12 months, have you been humiliated or emotionally abused in other ways by your partner or ex-partner?: No   Housing Stability: Low Risk  (4/19/2025)    Housing Stability     Do you have housing? : Yes     Are you worried about losing your housing?: No           4/19/2025   General Health   How would you rate your overall physical health? Good   Feel stress (tense, anxious, or unable to sleep) Only a little   (!) STRESS CONCERN      4/19/2025   Nutrition   Diet: I don't know         4/19/2025   Exercise   Days per week of moderate/strenous exercise 2 days   Average minutes spent exercising at this level 20 min   (!) EXERCISE CONCERN      4/19/2025   Social Factors   Frequency of gathering with friends or relatives Once a week   Worry food won't last until get money to buy more No   Food not last or not have enough money for food? No   Do you have housing? (Housing is defined as stable permanent housing and does not include staying ouside in a car, in a tent, in an abandoned building, in an overnight shelter, or couch-surfing.) Yes   Are you worried about losing your housing? No   Lack of transportation? No   Unable to get utilities (heat,electricity)? No         4/19/2025   Fall Risk   Fallen 2  or more times in the past year? No    No   Trouble with walking or balance? No    No       Multiple values from one day are sorted in reverse-chronological order          4/19/2025   Activities of Daily Living- Home Safety   Needs help with the following daily activites None of the above   Safety concerns in the home None of the above         4/19/2025   Dental   Dentist two times every year? Yes         4/19/2025   Hearing Screening   Hearing concerns? (!) I FEEL THAT PEOPLE ARE MUMBLING OR NOT SPEAKING CLEARLY.    (!) I NEED TO ASK PEOPLE TO SPEAK UP OR REPEAT THEMSELVES.    (!) IT'S HARD TO FOLLOW A CONVERSATION IN A NOISY RESTAURANT OR CROWDED ROOM.    (!) TROUBLE UNDESTANDING A SPEAKER IN A PUBLIC MEETING OR [employer] SERVICE.    (!) TROUBLE FOLLOWING DIALOGUE IN THE THEATHER.       Multiple values from one day are sorted in reverse-chronological order         4/19/2025   Driving Risk Screening   Patient/family members have concerns about driving (!) DECLINE         4/19/2025   General Alertness/Fatigue Screening   Have you been more tired than usual lately? No         4/19/2025   Urinary Incontinence Screening   Bothered by leaking urine in past 6 months Yes       Today's PHQ-9 Score:       4/22/2025     9:27 AM   PHQ-9 SCORE   PHQ-9 Total Score MyChart 4 (Minimal depression)   PHQ-9 Total Score 4        Patient-reported         4/19/2025   Substance Use   Alcohol more than 3/day or more than 7/wk No   Do you have a current opioid prescription? No   How severe/bad is pain from 1 to 10? 3/10   Do you use any other substances recreationally? (!) PRESCRIPTION DRUGS     Social History     Tobacco Use    Smoking status: Never    Smokeless tobacco: Never   Substance Use Topics    Alcohol use: Yes     Alcohol/week: 2.0 standard drinks of alcohol     Types: 2 Standard drinks or equivalent per week     Comment: Occasionally    Drug use: No           4/10/2024   LAST FHS-7 RESULTS   1st degree relative breast or ovarian  cancer Yes   Any relative bilateral breast cancer Unknown   Any male have breast cancer Unknown   Any ONE woman have BOTH breast AND ovarian cancer Unknown   Any woman with breast cancer before 50yrs Yes   2 or more relatives with breast AND/OR ovarian cancer Unknown   2 or more relatives with breast AND/OR bowel cancer Unknown            ASCVD Risk   The 10-year ASCVD risk score (Madisyn POPE, et al., 2019) is: 7.9%    Values used to calculate the score:      Age: 64 years      Sex: Female      Is Non- : No      Diabetic: No      Tobacco smoker: No      Systolic Blood Pressure: 130 mmHg      Is BP treated: Yes      HDL Cholesterol: 71 mg/dL      Total Cholesterol: 207 mg/dL      Reviewed and updated as needed this visit by Provider                      Current providers sharing in care for this patient include:  Patient Care Team:  Yg Sharp MD as PCP - General (Family Practice)  Kristofer Tello MD as MD (Ophthalmology)  Cesar Freitas MD as MD (Neurology)  Fabiola Allen RN as Specialty Care Coordinator (Clinical Cardiac Electrophysiology)  Aure Mcmullen MD as MD (Clinical Cardiac Electrophysiology)  Yg Sharp MD as Assigned PCP  Nicola Ohara MD as Assigned Neuroscience Provider  Nicola Ohara MD as MD (Neurology)  Kristofer Tello MD as Assigned Surgical Provider    The following health maintenance items are reviewed in Epic and correct as of today:  Health Maintenance   Topic Date Due    ADVANCE CARE PLANNING  Never done    DEPRESSION ACTION PLAN  Never done    COVID-19 Vaccine (7 - 2024-25 season) 04/02/2025    BMP  04/17/2025    LIPID  04/17/2025    ANNUAL REVIEW OF HM ORDERS  04/17/2025    MEDICARE ANNUAL WELLNESS VISIT  04/17/2025    PHQ-9  07/22/2025    MAMMO SCREENING  11/20/2025    FALL RISK ASSESSMENT  04/22/2026    DIABETES SCREENING  04/17/2027    COLORECTAL CANCER SCREENING  04/25/2032    DTAP/TDAP/TD IMMUNIZATION (4 - Td or Tdap) 07/24/2033     DEXA  11/21/2039    HEPATITIS C SCREENING  Completed    INFLUENZA VACCINE  Completed    Pneumococcal Vaccine: 50+ Years  Completed    ZOSTER IMMUNIZATION  Completed    RSV VACCINE  Completed    HPV IMMUNIZATION  Aged Out    MENINGITIS IMMUNIZATION  Aged Out    PAP  Discontinued          Objective    Exam  /85 (BP Location: Right arm, Patient Position: Sitting, Cuff Size: Adult Large)   Pulse 87   Temp 98  F (36.7  C) (Oral)   Resp 16   Ht 1.524 m (5')   Wt 76.5 kg (168 lb 11.2 oz)   SpO2 96%   BMI 32.95 kg/m     Estimated body mass index is 32.95 kg/m  as calculated from the following:    Height as of this encounter: 1.524 m (5').    Weight as of this encounter: 76.5 kg (168 lb 11.2 oz).    Physical Exam  GENERAL: alert and no distress  NECK: no adenopathy, no asymmetry, masses, or scars  RESP: lungs clear to auscultation - no rales, rhonchi or wheezes  CV: regular rate and rhythm, normal S1 S2, no S3 or S4, no murmur, click or rub, no peripheral edema  ABDOMEN: soft, nontender, no hepatosplenomegaly, no masses and bowel sounds normal  MS: no gross musculoskeletal defects noted, no edema        4/22/2025   Mini Cog   Clock Draw Score 2 Normal   3 Item Recall 1 object recalled   Mini Cog Total Score 3       Signed Electronically by: Yg Sharp MD    Answers submitted by the patient for this visit:  Patient Health Questionnaire (Submitted on 4/22/2025)  If you checked off any problems, how difficult have these problems made it for you to do your work, take care of things at home, or get along with other people?: Not difficult at all  PHQ9 TOTAL SCORE: 4

## 2025-04-22 NOTE — PATIENT INSTRUCTIONS
Patient Education   Preventive Care Advice   This is general advice given by our system to help you stay healthy. However, your care team may have specific advice just for you. Please talk to your care team about your preventive care needs.  Nutrition  Eat 5 or more servings of fruits and vegetables each day.  Try wheat bread, brown rice and whole grain pasta (instead of white bread, rice, and pasta).  Get enough calcium and vitamin D. Check the label on foods and aim for 100% of the RDA (recommended daily allowance).  Lifestyle  Exercise at least 150 minutes each week  (30 minutes a day, 5 days a week).  Do muscle strengthening activities 2 days a week. These help control your weight and prevent disease.  No smoking.  Wear sunscreen to prevent skin cancer.  Have a dental exam and cleaning every 6 months.  Yearly exams  See your health care team every year to talk about:  Any changes in your health.  Any medicines your care team has prescribed.  Preventive care, family planning, and ways to prevent chronic diseases.  Shots (vaccines)   HPV shots (up to age 26), if you've never had them before.  Hepatitis B shots (up to age 59), if you've never had them before.  COVID-19 shot: Get this shot when it's due.  Flu shot: Get a flu shot every year.  Tetanus shot: Get a tetanus shot every 10 years.  Pneumococcal, hepatitis A, and RSV shots: Ask your care team if you need these based on your risk.  Shingles shot (for age 50 and up)  General health tests  Diabetes screening:  Starting at age 35, Get screened for diabetes at least every 3 years.  If you are younger than age 35, ask your care team if you should be screened for diabetes.  Cholesterol test: At age 39, start having a cholesterol test every 5 years, or more often if advised.  Bone density scan (DEXA): At age 50, ask your care team if you should have this scan for osteoporosis (brittle bones).  Hepatitis C: Get tested at least once in your life.  STIs (sexually  transmitted infections)  Before age 24: Ask your care team if you should be screened for STIs.  After age 24: Get screened for STIs if you're at risk. You are at risk for STIs (including HIV) if:  You are sexually active with more than one person.  You don't use condoms every time.  You or a partner was diagnosed with a sexually transmitted infection.  If you are at risk for HIV, ask about PrEP medicine to prevent HIV.  Get tested for HIV at least once in your life, whether you are at risk for HIV or not.  Cancer screening tests  Cervical cancer screening: If you have a cervix, begin getting regular cervical cancer screening tests starting at age 21.  Breast cancer scan (mammogram): If you've ever had breasts, begin having regular mammograms starting at age 40. This is a scan to check for breast cancer.  Colon cancer screening: It is important to start screening for colon cancer at age 45.  Have a colonoscopy test every 10 years (or more often if you're at risk) Or, ask your provider about stool tests like a FIT test every year or Cologuard test every 3 years.  To learn more about your testing options, visit:   .  For help making a decision, visit:   https://bit.ly/hz56599.  Prostate cancer screening test: If you have a prostate, ask your care team if a prostate cancer screening test (PSA) at age 55 is right for you.  Lung cancer screening: If you are a current or former smoker ages 50 to 80, ask your care team if ongoing lung cancer screenings are right for you.  For informational purposes only. Not to replace the advice of your health care provider. Copyright   2023 [facility] [employer]. All rights reserved. Clinically reviewed by the [facility] Transitions Program. [employer] 770920 - REV 01/24.  Hearing Loss: Care Instructions  Overview     Hearing loss is a sudden or slow decrease in how well you hear. It can range from slight to profound. Permanent hearing loss can occur with aging. It also can  happen when you are exposed long-term to loud noise. Examples include listening to loud music, riding motorcycles, or being around other loud machines.  Hearing loss can affect your work and home life. It can make you feel lonely or depressed. You may feel that you have lost your independence. But hearing aids and other devices can help you hear better and feel connected to others.  Follow-up care is a key part of your treatment and safety. Be sure to make and go to all appointments, and call your doctor if you are having problems. It's also a good idea to know your test results and keep a list of the medicines you take.  How can you care for yourself at home?  Avoid loud noises whenever possible. This helps keep your hearing from getting worse.  Always wear hearing protection around loud noises.  Wear a hearing aid as directed.  A professional can help you pick a hearing aid that will work best for you.  You can also get hearing aids over the counter for mild to moderate hearing loss.  Have hearing tests as your doctor suggests. They can show whether your hearing has changed. Your hearing aid may need to be adjusted.  Use other devices as needed. These may include:  Telephone amplifiers and hearing aids that can connect to a television, stereo, radio, or microphone.  Devices that use lights or vibrations. These alert you to the doorbell, a ringing telephone, or a baby monitor.  Television closed-captioning. This shows the words at the bottom of the screen. Most new TVs can do this.  TTY (text telephone). This lets you type messages back and forth on the telephone instead of talking or listening. These devices are also called TDD. When messages are typed on the keyboard, they are sent over the phone line to a receiving TTY. The message is shown on a monitor.  Use text messaging, social media, and email if it is hard for you to communicate by telephone.  Try to learn a listening technique called speechreading. It is  "not lipreading. You pay attention to people's gestures, expressions, posture, and tone of voice. These clues can help you understand what a person is saying. Face the person you are talking to, and have them face you. Make sure the lighting is good. You need to see the other person's face clearly.  Think about counseling if you need help to adjust to your hearing loss.  When should you call for help?  Watch closely for changes in your health, and be sure to contact your doctor if:    You think your hearing is getting worse.     You have new symptoms, such as dizziness or nausea.   Where can you learn more?  Go to https://www.[employer].net/patiented  Enter R798 in the search box to learn more about \"Hearing Loss: Care Instructions.\"  Current as of: October 27, 2024  Content Version: 14.4    4174-7074 [employer].   Care instructions adapted under license by your healthcare professional. If you have questions about a medical condition or this instruction, always ask your healthcare professional. [employer] disclaims any warranty or liability for your use of this information.    Bladder Training: Care Instructions  Your Care Instructions     Bladder training is used to treat urge incontinence and stress incontinence. Urge incontinence means that the need to urinate comes on so fast that you can't get to a toilet in time. Stress incontinence means that you leak urine because of pressure on your bladder. For example, it may happen when you laugh, cough, or lift something heavy.  Bladder training can increase how long you can wait before you have to urinate. It can also help your bladder hold more urine. And it can give you better control over the urge to urinate.  It is important to remember that bladder training takes a few weeks to a few months to make a difference. You may not see results right away, but don't give up.  Follow-up care is a key part of your treatment and safety. Be sure to make " and go to all appointments, and call your doctor if you are having problems. It's also a good idea to know your test results and keep a list of the medicines you take.  How can you care for yourself at home?  Work with your doctor to come up with a bladder training program that is right for you. You may use one or more of the following methods.  Delayed urination  In the beginning, try to keep from urinating for 5 minutes after you first feel the need to go.  While you wait, take deep, slow breaths to relax. Kegel exercises can also help you delay the need to go to the bathroom.  After some practice, when you can easily wait 5 minutes to urinate, try to wait 10 minutes before you urinate.  Slowly increase the waiting period until you are able to control when you have to urinate.  Scheduled urination  Empty your bladder when you first wake up in the morning.  Schedule times throughout the day when you will urinate.  Start by going to the bathroom every hour, even if you don't need to go.  Slowly increase the time between trips to the bathroom.  When you have found a schedule that works well for you, keep doing it.  If you wake up during the night and have to urinate, do it. Apply your schedule to waking hours only.  Kegel exercises  These tighten and strengthen pelvic muscles, which can help you control the flow of urine. (If doing these exercises causes pain, stop doing them and talk with your doctor.) To do Kegel exercises:  Squeeze your muscles as if you were trying not to pass gas. Or squeeze your muscles as if you were stopping the flow of urine. Your belly, legs, and buttocks shouldn't move.  Hold the squeeze for 3 seconds, then relax for 5 to 10 seconds.  Start with 3 seconds, then add 1 second each week until you are able to squeeze for 10 seconds.  Repeat the exercise 10 times a session. Do 3 to 8 sessions a day.  When should you call for help?  Watch closely for changes in your health, and be sure to  "contact your doctor if:    Your incontinence is getting worse.     You do not get better as expected.   Where can you learn more?  Go to https://www.[employer].net/patiented  Enter V684 in the search box to learn more about \"Bladder Training: Care Instructions.\"  Current as of: April 30, 2024  Content Version: 14.4    2132-8373 [employer].   Care instructions adapted under license by your healthcare professional. If you have questions about a medical condition or this instruction, always ask your healthcare professional. [employer] disclaims any warranty or liability for your use of this information.    Substance Use Disorder: Care Instructions  Overview     You can improve your life and health by stopping your use of alcohol or drugs. When you don't drink or use drugs, you may feel and sleep better. You may get along better with your family, friends, and coworkers. There are medicines and programs that can help with substance use disorder.  How can you care for yourself at home?  Here are some ways to help you stay sober and prevent relapse.  If you have been given medicine to help keep you sober or reduce your cravings, be sure to take it exactly as prescribed.  Talk to your doctor about programs that can help you stop using drugs or drinking alcohol.  Do not keep alcohol or drugs in your home.  Plan ahead. Think about what you'll say if other people ask you to drink or use drugs. Try not to spend time with people who drink or use drugs.  Use the time and money spent on drinking or drugs to do something that's important to you.  Preventing a relapse  Have a plan to deal with relapse. Learn to recognize changes in your thinking that lead you to drink or use drugs. Get help before you start to drink or use drugs again.  Try to stay away from situations, friends, or places that may lead you to drink or use drugs.  If you feel the need to drink alcohol or use drugs again, seek help right away. " Call a trusted friend or family member. Some people get support from organizations such as Narcotics Anonymous or [employer] or from treatment facilities.  If you relapse, get help as soon as you can. Some people make a plan with another person that outlines what they want that person to do for them if they relapse. The plan usually includes how to handle the relapse and who to notify in case of relapse.  Don't give up. Remember that a relapse doesn't mean that you have failed. Use the experience to learn the triggers that lead you to drink or use drugs. Then quit again. Recovery is a lifelong process. Many people have several relapses before they are able to quit for good.  Follow-up care is a key part of your treatment and safety. Be sure to make and go to all appointments, and call your doctor if you are having problems. It's also a good idea to know your test results and keep a list of the medicines you take.  When should you call for help?   Call 911  anytime you think you may need emergency care. For example, call if you or someone else:    Has overdosed or has withdrawal signs. Be sure to tell the emergency workers that you are or someone else is using or trying to quit using drugs. Overdose or withdrawal signs may include:  Losing consciousness.  Seizure.  Seeing or hearing things that aren't there (hallucinations).     Is thinking or talking about suicide or harming others.   Where to get help 24 hours a day, 7 days a week   If you or someone you know talks about suicide, self-harm, a mental health crisis, a substance use crisis, or any other kind of emotional distress, get help right away. You can:    Call the Suicide and Crisis Lifeline at 179.     Call 9-353-559-TALK (1-725.209.1723).     Text HOME to 367051 to access the Crisis Text Line.   Consider saving these numbers in your phone.  Go to [employer] for more information or to chat online.  Call your doctor now or seek immediate medical care  "if:    You are having withdrawal symptoms. These may include nausea or vomiting, sweating, shakiness, and anxiety.   Watch closely for changes in your health, and be sure to contact your doctor if:    You have a relapse.     You need more help or support to stop.   Where can you learn more?  Go to https://www.[employer].net/patiented  Enter H573 in the search box to learn more about \"Substance Use Disorder: Care Instructions.\"  Current as of: August 20, 2024  Content Version: 14.4    8985-1916 [employer].   Care instructions adapted under license by your healthcare professional. If you have questions about a medical condition or this instruction, always ask your healthcare professional. [employer] disclaims any warranty or liability for your use of this information.       "

## 2025-04-23 ENCOUNTER — PATIENT OUTREACH (OUTPATIENT)
Dept: CARE COORDINATION | Facility: CLINIC | Age: 65
End: 2025-04-23
Payer: MEDICARE

## 2025-06-18 ENCOUNTER — TELEPHONE (OUTPATIENT)
Dept: NEUROLOGY | Facility: CLINIC | Age: 65
End: 2025-06-18

## 2025-06-18 DIAGNOSIS — G40.219 PARTIAL SYMPTOMATIC EPILEPSY WITH COMPLEX PARTIAL SEIZURES, INTRACTABLE, WITHOUT STATUS EPILEPTICUS (H): ICD-10-CM

## 2025-06-18 DIAGNOSIS — G40.309 GENERALIZED CONVULSIVE EPILEPSY (H): ICD-10-CM

## 2025-06-18 RX ORDER — NORTRIPTYLINE HYDROCHLORIDE 10 MG/1
10 CAPSULE ORAL AT BEDTIME
Qty: 30 CAPSULE | Refills: 0 | Status: SHIPPED | OUTPATIENT
Start: 2025-06-18

## 2025-06-18 RX ORDER — LEVETIRACETAM 500 MG/1
1500 TABLET ORAL 2 TIMES DAILY
Qty: 180 TABLET | Refills: 0 | Status: SHIPPED | OUTPATIENT
Start: 2025-06-18

## 2025-06-18 RX ORDER — LAMOTRIGINE 100 MG/1
100 TABLET ORAL 2 TIMES DAILY
Qty: 60 TABLET | Refills: 0 | Status: SHIPPED | OUTPATIENT
Start: 2025-06-18

## 2025-06-18 NOTE — TELEPHONE ENCOUNTER
Health Call Center    Phone Message    May a detailed message be left on voicemail: yes     Reason for Call: Medication Refill Request    Has the patient contacted the pharmacy for the refill? Yes   Name of medication being requested: levETIRAcetam (KEPPRA) 500 MG tablet, lamoTRIgine (LAMICTAL) 100 MG tablet, nortriptyline (PAMELOR) 10 MG capsule     Provider who prescribed the medication: Dr. Ohara    Pharmacy: [facility] [address] [place] [address]    Date medication is needed: 06/23    Action Taken: Message routed to:  Other: MINCEP    Travel Screening: Not Applicable     Date of Service:

## 2025-06-18 NOTE — TELEPHONE ENCOUNTER
Last seen: 5/22/24  RTC:  3 months  Cancel: no  No-show: no  Next appt: 6/27/25 with Dr. Whitfiled - will fill for a month to get patient up to this appt    Incoming refill from patient via phones     Medication requested: lamoTRIgine (LAMICTAL) 100 MG tablet   Directions: Take 1 tablet (100 mg) by mouth daily - Oral   Qty: 60  Last refilled: May 2024     Medication requested: levETIRAcetam (KEPPRA) 500 MG tablet   Directions: Take 3 tablets (1,500 mg) by mouth 2 times daily - Oral   Qty: 180  Last refilled: May 2024    Medication requested: nortriptyline (PAMELOR) 10 MG capsule   Directions: : Take 1 capsule (10 mg) by mouth at bedtime - Oral   Qty: 30  Last refilled: May 2024      Medication refill approved per refill protocol

## 2025-07-19 DIAGNOSIS — G40.309 GENERALIZED CONVULSIVE EPILEPSY (H): ICD-10-CM

## 2025-07-19 DIAGNOSIS — G40.219 PARTIAL SYMPTOMATIC EPILEPSY WITH COMPLEX PARTIAL SEIZURES, INTRACTABLE, WITHOUT STATUS EPILEPTICUS (H): ICD-10-CM

## 2025-07-21 ENCOUNTER — LAB (OUTPATIENT)
Dept: LAB | Facility: CLINIC | Age: 65
End: 2025-07-21
Payer: MEDICARE

## 2025-07-21 DIAGNOSIS — G40.209 PARTIAL SYMPTOMATIC EPILEPSY WITH COMPLEX PARTIAL SEIZURES, NOT INTRACTABLE, WITHOUT STATUS EPILEPTICUS (H): ICD-10-CM

## 2025-07-21 LAB — LEVETIRACETAM SERPL-MCNC: 82.5 ÂΜG/ML (ref 10–40)

## 2025-07-21 PROCEDURE — 80175 DRUG SCREEN QUAN LAMOTRIGINE: CPT | Mod: 90 | Performed by: PATHOLOGY

## 2025-07-21 PROCEDURE — 80177 DRUG SCRN QUAN LEVETIRACETAM: CPT | Performed by: INTERNAL MEDICINE

## 2025-07-21 PROCEDURE — 36415 COLL VENOUS BLD VENIPUNCTURE: CPT | Performed by: PATHOLOGY

## 2025-07-21 PROCEDURE — 99000 SPECIMEN HANDLING OFFICE-LAB: CPT | Performed by: PATHOLOGY

## 2025-07-22 RX ORDER — LEVETIRACETAM 500 MG/1
1500 TABLET ORAL 2 TIMES DAILY
Qty: 558 TABLET | Refills: 1 | Status: SHIPPED | OUTPATIENT
Start: 2025-07-22

## 2025-07-22 NOTE — TELEPHONE ENCOUNTER
Last Written Prescription:  levETIRAcetam (KEPPRA) 500 MG tablet 180 tablet 0 6/18/2025     ----------------------  Last Visit Date: 6/27/25  Future Visit Date: 9/29/25  ----------------------  PLAN  Start a seizure diary  Continue levetiracetam 1500 mg BID     Refill decision:   [x] Medication refilled per  Medication Refill in Ambulatory Care  policy.    Creatinine   Date Value Ref Range Status   04/22/2025 0.67 0.51 - 0.95 mg/dL Final   06/30/2020 0.70 0.52 - 1.04 mg/dL Final             Request from pharmacy:  Requested Prescriptions   Pending Prescriptions Disp Refills    levETIRAcetam (KEPPRA) 500 MG tablet [Pharmacy Med Name: levETIRAcetam Oral Tablet 500 MG] 180 tablet 0     Sig: Take 3 tablets (1,500 mg) by mouth 2 times daily.       There is no refill protocol information for this order        Overridden by Carola Schwartz, RN on Jun 18, 2025 11:28 AM   Dose   1. LEVETIRACETAM, 1,500 MG, ORAL, 2 TIMES DAILY  DAILY DOSE 3,000 MG. OVERDOSE (MAX. 2,000 MG);  SINGLE DOSE 1,500 MG. OVERDOSE (MAX. 1,000 MG) [Reason: Tolerated medication/side effects in past]

## 2025-07-23 LAB — LAMOTRIGINE SERPL-MCNC: 12.9 UG/ML

## (undated) RX ORDER — SIMETHICONE 40MG/0.6ML
SUSPENSION, DROPS(FINAL DOSAGE FORM)(ML) ORAL
Status: DISPENSED
Start: 2022-04-25

## (undated) RX ORDER — FENTANYL CITRATE 50 UG/ML
INJECTION, SOLUTION INTRAMUSCULAR; INTRAVENOUS
Status: DISPENSED
Start: 2022-04-25